# Patient Record
Sex: FEMALE | Race: WHITE | NOT HISPANIC OR LATINO | Employment: FULL TIME | URBAN - METROPOLITAN AREA
[De-identification: names, ages, dates, MRNs, and addresses within clinical notes are randomized per-mention and may not be internally consistent; named-entity substitution may affect disease eponyms.]

---

## 2017-01-01 ENCOUNTER — COMMITTEE REVIEW (OUTPATIENT)
Dept: TRANSPLANT | Facility: CLINIC | Age: 62
End: 2017-01-01

## 2017-01-01 ENCOUNTER — ANESTHESIA (OUTPATIENT)
Dept: INTENSIVE CARE | Facility: HOSPITAL | Age: 62
DRG: 270 | End: 2017-01-01
Payer: COMMERCIAL

## 2017-01-01 ENCOUNTER — EDUCATION (OUTPATIENT)
Dept: TRANSPLANT | Facility: CLINIC | Age: 62
End: 2017-01-01

## 2017-01-01 ENCOUNTER — HOSPITAL ENCOUNTER (INPATIENT)
Facility: HOSPITAL | Age: 62
LOS: 23 days | DRG: 270 | End: 2017-01-27
Attending: INTERNAL MEDICINE | Admitting: INTERNAL MEDICINE
Payer: COMMERCIAL

## 2017-01-01 ENCOUNTER — ANESTHESIA EVENT (OUTPATIENT)
Dept: INTENSIVE CARE | Facility: HOSPITAL | Age: 62
DRG: 270 | End: 2017-01-01
Payer: COMMERCIAL

## 2017-01-01 ENCOUNTER — TELEPHONE (OUTPATIENT)
Dept: ADMINISTRATIVE | Facility: HOSPITAL | Age: 62
End: 2017-01-01

## 2017-01-01 ENCOUNTER — APPOINTMENT (OUTPATIENT)
Dept: CARDIOLOGY | Facility: CLINIC | Age: 62
End: 2017-01-01
Payer: COMMERCIAL

## 2017-01-01 ENCOUNTER — DOCUMENTATION ONLY (OUTPATIENT)
Dept: TRANSPLANT | Facility: CLINIC | Age: 62
End: 2017-01-01

## 2017-01-01 ENCOUNTER — TELEPHONE (OUTPATIENT)
Dept: TRANSPLANT | Facility: CLINIC | Age: 62
End: 2017-01-01

## 2017-01-01 ENCOUNTER — SOCIAL WORK (OUTPATIENT)
Dept: TRANSPLANT | Facility: HOSPITAL | Age: 62
End: 2017-01-01
Payer: COMMERCIAL

## 2017-01-01 VITALS
SYSTOLIC BLOOD PRESSURE: 132 MMHG | OXYGEN SATURATION: 96 % | HEART RATE: 80 BPM | RESPIRATION RATE: 18 BRPM | HEIGHT: 71 IN | WEIGHT: 233.44 LBS | TEMPERATURE: 100 F | BODY MASS INDEX: 32.68 KG/M2 | DIASTOLIC BLOOD PRESSURE: 55 MMHG

## 2017-01-01 DIAGNOSIS — I42.8 NICM (NONISCHEMIC CARDIOMYOPATHY): ICD-10-CM

## 2017-01-01 DIAGNOSIS — I50.9 ACUTE DECOMPENSATED HEART FAILURE: Primary | ICD-10-CM

## 2017-01-01 DIAGNOSIS — Z71.89 GOALS OF CARE, COUNSELING/DISCUSSION: ICD-10-CM

## 2017-01-01 DIAGNOSIS — R91.8 PULMONARY INFILTRATES ON CXR: ICD-10-CM

## 2017-01-01 DIAGNOSIS — R91.8 ABNORMAL CT LUNG SCREENING: ICD-10-CM

## 2017-01-01 DIAGNOSIS — Z76.82 ORGAN TRANSPLANT CANDIDATE: ICD-10-CM

## 2017-01-01 DIAGNOSIS — B78.9 STRONGYLOIDOSIS: ICD-10-CM

## 2017-01-01 DIAGNOSIS — Z01.818 PRE-TRANSPLANT EVALUATION FOR HEART TRANSPLANT: ICD-10-CM

## 2017-01-01 DIAGNOSIS — Z51.5 PALLIATIVE CARE ENCOUNTER: ICD-10-CM

## 2017-01-01 DIAGNOSIS — R53.83 FATIGUE, UNSPECIFIED TYPE: ICD-10-CM

## 2017-01-01 DIAGNOSIS — I42.8 OTHER CARDIOMYOPATHIES: ICD-10-CM

## 2017-01-01 LAB
25(OH)D3+25(OH)D2 SERPL-MCNC: 15 NG/ML
A1 AG RBC QL: NORMAL
ABO + RH BLD: NORMAL
ABO + RH BLD: NORMAL
ALBUMIN SERPL BCP-MCNC: 2.4 G/DL
ALBUMIN SERPL BCP-MCNC: 2.4 G/DL
ALBUMIN SERPL BCP-MCNC: 2.5 G/DL
ALBUMIN SERPL BCP-MCNC: 2.5 G/DL
ALBUMIN SERPL BCP-MCNC: 2.6 G/DL
ALBUMIN SERPL BCP-MCNC: 2.7 G/DL
ALBUMIN SERPL BCP-MCNC: 2.8 G/DL
ALBUMIN SERPL BCP-MCNC: 2.9 G/DL
ALBUMIN SERPL BCP-MCNC: 3 G/DL
ALBUMIN SERPL BCP-MCNC: 3.1 G/DL
ALBUMIN SERPL BCP-MCNC: 3.2 G/DL
ALBUMIN SERPL BCP-MCNC: 3.3 G/DL
ALBUMIN SERPL BCP-MCNC: 3.3 G/DL
ALBUMIN SERPL BCP-MCNC: 3.4 G/DL
ALLENS TEST: ABNORMAL
ALP SERPL-CCNC: 38 U/L
ALP SERPL-CCNC: 40 U/L
ALP SERPL-CCNC: 41 U/L
ALP SERPL-CCNC: 42 U/L
ALP SERPL-CCNC: 43 U/L
ALP SERPL-CCNC: 43 U/L
ALP SERPL-CCNC: 44 U/L
ALP SERPL-CCNC: 45 U/L
ALP SERPL-CCNC: 46 U/L
ALP SERPL-CCNC: 47 U/L
ALP SERPL-CCNC: 49 U/L
ALP SERPL-CCNC: 50 U/L
ALP SERPL-CCNC: 51 U/L
ALP SERPL-CCNC: 52 U/L
ALP SERPL-CCNC: 53 U/L
ALP SERPL-CCNC: 54 U/L
ALP SERPL-CCNC: 56 U/L
ALP SERPL-CCNC: 57 U/L
ALP SERPL-CCNC: 61 U/L
ALP SERPL-CCNC: 63 U/L
ALP SERPL-CCNC: 63 U/L
ALT SERPL W/O P-5'-P-CCNC: 10 U/L
ALT SERPL W/O P-5'-P-CCNC: 11 U/L
ALT SERPL W/O P-5'-P-CCNC: 13 U/L
ALT SERPL W/O P-5'-P-CCNC: 14 U/L
ALT SERPL W/O P-5'-P-CCNC: 17 U/L
ALT SERPL W/O P-5'-P-CCNC: 17 U/L
ALT SERPL W/O P-5'-P-CCNC: 20 U/L
ALT SERPL W/O P-5'-P-CCNC: 22 U/L
ALT SERPL W/O P-5'-P-CCNC: 23 U/L
ALT SERPL W/O P-5'-P-CCNC: 23 U/L
ALT SERPL W/O P-5'-P-CCNC: 24 U/L
ALT SERPL W/O P-5'-P-CCNC: 25 U/L
ALT SERPL W/O P-5'-P-CCNC: 25 U/L
ALT SERPL W/O P-5'-P-CCNC: 27 U/L
ALT SERPL W/O P-5'-P-CCNC: 27 U/L
ALT SERPL W/O P-5'-P-CCNC: 28 U/L
ALT SERPL W/O P-5'-P-CCNC: 30 U/L
ALT SERPL W/O P-5'-P-CCNC: 31 U/L
ALT SERPL W/O P-5'-P-CCNC: 35 U/L
ALT SERPL W/O P-5'-P-CCNC: 36 U/L
ALT SERPL W/O P-5'-P-CCNC: 37 U/L
ALT SERPL W/O P-5'-P-CCNC: 38 U/L
ALT SERPL W/O P-5'-P-CCNC: 40 U/L
ALT SERPL W/O P-5'-P-CCNC: 8 U/L
ALT SERPL W/O P-5'-P-CCNC: 9 U/L
AMPHETAMINES SERPL QL: NEGATIVE
ANA SER QL IF: NORMAL
ANCA AB TITR SER IF: NORMAL TITER
ANION GAP SERPL CALC-SCNC: 10 MMOL/L
ANION GAP SERPL CALC-SCNC: 11 MMOL/L
ANION GAP SERPL CALC-SCNC: 12 MMOL/L
ANION GAP SERPL CALC-SCNC: 13 MMOL/L
ANION GAP SERPL CALC-SCNC: 13 MMOL/L
ANION GAP SERPL CALC-SCNC: 14 MMOL/L
ANION GAP SERPL CALC-SCNC: 15 MMOL/L
ANION GAP SERPL CALC-SCNC: 15 MMOL/L
ANION GAP SERPL CALC-SCNC: 16 MMOL/L
ANION GAP SERPL CALC-SCNC: 17 MMOL/L
ANION GAP SERPL CALC-SCNC: 5 MMOL/L
ANION GAP SERPL CALC-SCNC: 7 MMOL/L
ANION GAP SERPL CALC-SCNC: 7 MMOL/L
ANION GAP SERPL CALC-SCNC: 8 MMOL/L
ANION GAP SERPL CALC-SCNC: 9 MMOL/L
ANISOCYTOSIS BLD QL SMEAR: SLIGHT
APPEARANCE FLD: NORMAL
APTT BLDCRRT: 105.8 SEC
APTT BLDCRRT: 26.1 SEC
APTT BLDCRRT: 26.6 SEC
AST SERPL-CCNC: 12 U/L
AST SERPL-CCNC: 12 U/L
AST SERPL-CCNC: 13 U/L
AST SERPL-CCNC: 14 U/L
AST SERPL-CCNC: 15 U/L
AST SERPL-CCNC: 16 U/L
AST SERPL-CCNC: 17 U/L
AST SERPL-CCNC: 17 U/L
AST SERPL-CCNC: 18 U/L
AST SERPL-CCNC: 20 U/L
AST SERPL-CCNC: 21 U/L
AST SERPL-CCNC: 22 U/L
AST SERPL-CCNC: 24 U/L
AST SERPL-CCNC: 24 U/L
AST SERPL-CCNC: 25 U/L
AST SERPL-CCNC: 25 U/L
AST SERPL-CCNC: 27 U/L
AST SERPL-CCNC: 29 U/L
AST SERPL-CCNC: 31 U/L
AST SERPL-CCNC: 33 U/L
AST SERPL-CCNC: 37 U/L
AT III AG ACT/NOR PPP IA: 82 %
BACTERIA #/AREA URNS AUTO: ABNORMAL /HPF
BACTERIA BLD CULT: NORMAL
BACTERIA SPEC AEROBE CULT: NORMAL
BACTERIA UR CULT: NO GROWTH
BACTERIA UR CULT: NO GROWTH
BARBITURATES SERPL QL SCN: NEGATIVE
BASOPHILS # BLD AUTO: 0.01 K/UL
BASOPHILS # BLD AUTO: 0.02 K/UL
BASOPHILS # BLD AUTO: 0.03 K/UL
BASOPHILS # BLD AUTO: 0.06 K/UL
BASOPHILS # BLD AUTO: 0.06 K/UL
BASOPHILS NFR BLD: 0.1 %
BASOPHILS NFR BLD: 0.2 %
BASOPHILS NFR BLD: 0.3 %
BASOPHILS NFR BLD: 0.4 %
BENZODIAZ SERPL QL: NEGATIVE
BILIRUB DIRECT SERPL-MCNC: 0.3 MG/DL
BILIRUB SERPL-MCNC: 0.5 MG/DL
BILIRUB SERPL-MCNC: 0.6 MG/DL
BILIRUB SERPL-MCNC: 0.7 MG/DL
BILIRUB SERPL-MCNC: 0.8 MG/DL
BILIRUB SERPL-MCNC: 0.9 MG/DL
BILIRUB SERPL-MCNC: 1 MG/DL
BILIRUB SERPL-MCNC: 1.1 MG/DL
BILIRUB SERPL-MCNC: 1.3 MG/DL
BILIRUB SERPL-MCNC: 1.4 MG/DL
BILIRUB SERPL-MCNC: 1.4 MG/DL
BILIRUB UR QL STRIP: NEGATIVE
BLD GP AB SCN CELLS X3 SERPL QL: NORMAL
BNP SERPL-MCNC: 158 PG/ML
BNP SERPL-MCNC: 253 PG/ML
BNP SERPL-MCNC: 354 PG/ML
BNP SERPL-MCNC: 594 PG/ML
BNP SERPL-MCNC: <10 PG/ML
BODY FLD TYPE: NORMAL
BODY FLUID COMMENTS: NORMAL
BUN SERPL-MCNC: 12 MG/DL
BUN SERPL-MCNC: 13 MG/DL
BUN SERPL-MCNC: 14 MG/DL
BUN SERPL-MCNC: 15 MG/DL
BUN SERPL-MCNC: 16 MG/DL
BUN SERPL-MCNC: 17 MG/DL
BUN SERPL-MCNC: 17 MG/DL
BUN SERPL-MCNC: 18 MG/DL
BUN SERPL-MCNC: 18 MG/DL
BUN SERPL-MCNC: 19 MG/DL
BUN SERPL-MCNC: 21 MG/DL
BUN SERPL-MCNC: 22 MG/DL
BUN SERPL-MCNC: 22 MG/DL
BUN SERPL-MCNC: 24 MG/DL
BUN SERPL-MCNC: 25 MG/DL
BUN SERPL-MCNC: 27 MG/DL
BUN SERPL-MCNC: 28 MG/DL
BUN SERPL-MCNC: 28 MG/DL
BUN SERPL-MCNC: 31 MG/DL
BUN SERPL-MCNC: 34 MG/DL
BUN SERPL-MCNC: 36 MG/DL
BUN SERPL-MCNC: 36 MG/DL
BUN SERPL-MCNC: 37 MG/DL
BUN SERPL-MCNC: 45 MG/DL
BUN SERPL-MCNC: 53 MG/DL
BUN SERPL-MCNC: 56 MG/DL
BUN SERPL-MCNC: 60 MG/DL
BUN SERPL-MCNC: 61 MG/DL
CALCIUM SERPL-MCNC: 8.4 MG/DL
CALCIUM SERPL-MCNC: 8.4 MG/DL
CALCIUM SERPL-MCNC: 8.5 MG/DL
CALCIUM SERPL-MCNC: 8.6 MG/DL
CALCIUM SERPL-MCNC: 8.7 MG/DL
CALCIUM SERPL-MCNC: 8.8 MG/DL
CALCIUM SERPL-MCNC: 8.9 MG/DL
CALCIUM SERPL-MCNC: 9 MG/DL
CALCIUM SERPL-MCNC: 9.1 MG/DL
CALCIUM SERPL-MCNC: 9.2 MG/DL
CALCIUM SERPL-MCNC: 9.4 MG/DL
CALCIUM SERPL-MCNC: 9.5 MG/DL
CANNABINOIDS SERPL QL: NEGATIVE
CARDIOLIPIN IGG SER IA-ACNC: <9.4 GPL
CARDIOLIPIN IGM SER IA-ACNC: <9.4 MPL
CCP AB SER IA-ACNC: 0.6 U/ML
CHLORIDE SERPL-SCNC: 100 MMOL/L
CHLORIDE SERPL-SCNC: 101 MMOL/L
CHLORIDE SERPL-SCNC: 101 MMOL/L
CHLORIDE SERPL-SCNC: 102 MMOL/L
CHLORIDE SERPL-SCNC: 103 MMOL/L
CHLORIDE SERPL-SCNC: 104 MMOL/L
CHLORIDE SERPL-SCNC: 105 MMOL/L
CHLORIDE SERPL-SCNC: 91 MMOL/L
CHLORIDE SERPL-SCNC: 92 MMOL/L
CHLORIDE SERPL-SCNC: 92 MMOL/L
CHLORIDE SERPL-SCNC: 93 MMOL/L
CHLORIDE SERPL-SCNC: 94 MMOL/L
CHLORIDE SERPL-SCNC: 95 MMOL/L
CHLORIDE SERPL-SCNC: 96 MMOL/L
CHLORIDE SERPL-SCNC: 97 MMOL/L
CHLORIDE SERPL-SCNC: 98 MMOL/L
CHLORIDE SERPL-SCNC: 99 MMOL/L
CHOLEST/HDLC SERPL: 4.4 {RATIO}
CHOLEST/HDLC SERPL: 4.7 {RATIO}
CHOLEST/HDLC SERPL: 4.7 {RATIO}
CK MB SERPL-MCNC: 0.4 NG/ML
CK MB SERPL-MCNC: 0.6 NG/ML
CK MB SERPL-RTO: 1.7 %
CK MB SERPL-RTO: 3.3 %
CK SERPL-CCNC: 18 U/L
CK SERPL-CCNC: 18 U/L
CK SERPL-CCNC: 24 U/L
CLARITY UR REFRACT.AUTO: ABNORMAL
CLARITY UR REFRACT.AUTO: CLEAR
CLARITY UR REFRACT.AUTO: CLEAR
CLASS I ANTIBODIES - LUMINEX: NORMAL
CLASS I ANTIBODY COMMENTS - LUMINEX: NORMAL
CLASS II ANTIBODY COMMENTS - LUMINEX: NORMAL
CMV IGG SERPL QL IA: REACTIVE
CO2 SERPL-SCNC: 20 MMOL/L
CO2 SERPL-SCNC: 22 MMOL/L
CO2 SERPL-SCNC: 23 MMOL/L
CO2 SERPL-SCNC: 25 MMOL/L
CO2 SERPL-SCNC: 26 MMOL/L
CO2 SERPL-SCNC: 27 MMOL/L
CO2 SERPL-SCNC: 28 MMOL/L
CO2 SERPL-SCNC: 29 MMOL/L
CO2 SERPL-SCNC: 31 MMOL/L
CO2 SERPL-SCNC: 32 MMOL/L
CO2 SERPL-SCNC: 33 MMOL/L
CO2 SERPL-SCNC: 33 MMOL/L
CO2 SERPL-SCNC: 36 MMOL/L
CO2 SERPL-SCNC: 36 MMOL/L
CO2 SERPL-SCNC: 37 MMOL/L
CO2 SERPL-SCNC: 37 MMOL/L
CO2 SERPL-SCNC: 38 MMOL/L
CO2 SERPL-SCNC: 38 MMOL/L
COCAINE SERPL QL: NEGATIVE
COLOR FLD: NORMAL
COLOR UR AUTO: YELLOW
CORTIS SERPL-MCNC: 18.3 UG/DL
COTININE SERPL-MCNC: <3 NG/ML
CPRA %: 64
CREAT SERPL-MCNC: 1 MG/DL
CREAT SERPL-MCNC: 1.1 MG/DL
CREAT SERPL-MCNC: 1.2 MG/DL
CREAT SERPL-MCNC: 1.3 MG/DL
CREAT SERPL-MCNC: 1.4 MG/DL
CREAT SERPL-MCNC: 1.6 MG/DL
CREAT SERPL-MCNC: 1.7 MG/DL
CREAT SERPL-MCNC: 1.8 MG/DL
CREAT SERPL-MCNC: 1.9 MG/DL
CREAT SERPL-MCNC: 2.1 MG/DL
CREAT SERPL-MCNC: 2.1 MG/DL
CREAT SERPL-MCNC: 2.3 MG/DL
CRP SERPL-MCNC: 136.1 MG/L
CRP SERPL-MCNC: 149.9 MG/L
CRP SERPL-MCNC: 15 MG/L
CRP SERPL-MCNC: 20.9 MG/L
CRP SERPL-MCNC: 23.1 MG/L
CRP SERPL-MCNC: 30.9 MG/L
CRP SERPL-MCNC: 32.3 MG/L
CRP SERPL-MCNC: 379 MG/L
CRYPTOC AG SER QL LA: NEGATIVE
DELSYS: ABNORMAL
DIASTOLIC DYSFUNCTION: NO
DIFFERENTIAL METHOD: ABNORMAL
EBV VCA IGG SER QL IA: POSITIVE
EOSINOPHIL # BLD AUTO: 0 K/UL
EOSINOPHIL # BLD AUTO: 0.1 K/UL
EOSINOPHIL # BLD AUTO: 0.2 K/UL
EOSINOPHIL # BLD AUTO: 0.3 K/UL
EOSINOPHIL # BLD AUTO: 0.5 K/UL
EOSINOPHIL # BLD AUTO: 0.7 K/UL
EOSINOPHIL NFR BLD: 0 %
EOSINOPHIL NFR BLD: 0.5 %
EOSINOPHIL NFR BLD: 0.7 %
EOSINOPHIL NFR BLD: 1.2 %
EOSINOPHIL NFR BLD: 1.4 %
EOSINOPHIL NFR BLD: 1.5 %
EOSINOPHIL NFR BLD: 1.5 %
EOSINOPHIL NFR BLD: 1.6 %
EOSINOPHIL NFR BLD: 1.7 %
EOSINOPHIL NFR BLD: 1.9 %
EOSINOPHIL NFR BLD: 2 %
EOSINOPHIL NFR BLD: 2 %
EOSINOPHIL NFR BLD: 2.1 %
EOSINOPHIL NFR BLD: 2.1 %
EOSINOPHIL NFR BLD: 2.2 %
EOSINOPHIL NFR BLD: 2.3 %
EOSINOPHIL NFR BLD: 2.5 %
EOSINOPHIL NFR BLD: 2.5 %
EOSINOPHIL NFR BLD: 3.2 %
EOSINOPHIL NFR BLD: 4.1 %
EOSINOPHIL NFR FLD MANUAL: 2 %
ERYTHROCYTE [DISTWIDTH] IN BLOOD BY AUTOMATED COUNT: 16.2 %
ERYTHROCYTE [DISTWIDTH] IN BLOOD BY AUTOMATED COUNT: 16.3 %
ERYTHROCYTE [DISTWIDTH] IN BLOOD BY AUTOMATED COUNT: 16.4 %
ERYTHROCYTE [DISTWIDTH] IN BLOOD BY AUTOMATED COUNT: 16.5 %
ERYTHROCYTE [DISTWIDTH] IN BLOOD BY AUTOMATED COUNT: 16.6 %
ERYTHROCYTE [DISTWIDTH] IN BLOOD BY AUTOMATED COUNT: 16.7 %
ERYTHROCYTE [DISTWIDTH] IN BLOOD BY AUTOMATED COUNT: 16.8 %
ERYTHROCYTE [DISTWIDTH] IN BLOOD BY AUTOMATED COUNT: 16.9 %
ERYTHROCYTE [DISTWIDTH] IN BLOOD BY AUTOMATED COUNT: 17 %
ERYTHROCYTE [DISTWIDTH] IN BLOOD BY AUTOMATED COUNT: 17.2 %
ERYTHROCYTE [DISTWIDTH] IN BLOOD BY AUTOMATED COUNT: 17.6 %
ERYTHROCYTE [DISTWIDTH] IN BLOOD BY AUTOMATED COUNT: 17.6 %
ERYTHROCYTE [DISTWIDTH] IN BLOOD BY AUTOMATED COUNT: 17.7 %
ERYTHROCYTE [DISTWIDTH] IN BLOOD BY AUTOMATED COUNT: 17.7 %
ERYTHROCYTE [DISTWIDTH] IN BLOOD BY AUTOMATED COUNT: 17.9 %
ERYTHROCYTE [DISTWIDTH] IN BLOOD BY AUTOMATED COUNT: 18 %
ERYTHROCYTE [DISTWIDTH] IN BLOOD BY AUTOMATED COUNT: 18 %
ERYTHROCYTE [DISTWIDTH] IN BLOOD BY AUTOMATED COUNT: 18.2 %
ERYTHROCYTE [DISTWIDTH] IN BLOOD BY AUTOMATED COUNT: 18.2 %
ERYTHROCYTE [DISTWIDTH] IN BLOOD BY AUTOMATED COUNT: 18.3 %
ERYTHROCYTE [DISTWIDTH] IN BLOOD BY AUTOMATED COUNT: 18.3 %
ERYTHROCYTE [SEDIMENTATION RATE] IN BLOOD BY WESTERGREN METHOD: 12 MM/H
ERYTHROCYTE [SEDIMENTATION RATE] IN BLOOD BY WESTERGREN METHOD: 14 MM/H
ERYTHROCYTE [SEDIMENTATION RATE] IN BLOOD BY WESTERGREN METHOD: 25 MM/H
ERYTHROCYTE [SEDIMENTATION RATE] IN BLOOD BY WESTERGREN METHOD: 27 MM/H
ERYTHROCYTE [SEDIMENTATION RATE] IN BLOOD BY WESTERGREN METHOD: 28 MM/H
ERYTHROCYTE [SEDIMENTATION RATE] IN BLOOD BY WESTERGREN METHOD: 49 MM/HR
EST. GFR  (AFRICAN AMERICAN): 25.7 ML/MIN/1.73 M^2
EST. GFR  (AFRICAN AMERICAN): 28.7 ML/MIN/1.73 M^2
EST. GFR  (AFRICAN AMERICAN): 28.7 ML/MIN/1.73 M^2
EST. GFR  (AFRICAN AMERICAN): 32.3 ML/MIN/1.73 M^2
EST. GFR  (AFRICAN AMERICAN): 34.5 ML/MIN/1.73 M^2
EST. GFR  (AFRICAN AMERICAN): 37 ML/MIN/1.73 M^2
EST. GFR  (AFRICAN AMERICAN): 39.8 ML/MIN/1.73 M^2
EST. GFR  (AFRICAN AMERICAN): 46.8 ML/MIN/1.73 M^2
EST. GFR  (AFRICAN AMERICAN): 51.2 ML/MIN/1.73 M^2
EST. GFR  (AFRICAN AMERICAN): 56.4 ML/MIN/1.73 M^2
EST. GFR  (AFRICAN AMERICAN): >60 ML/MIN/1.73 M^2
EST. GFR  (NON AFRICAN AMERICAN): 22.3 ML/MIN/1.73 M^2
EST. GFR  (NON AFRICAN AMERICAN): 24.9 ML/MIN/1.73 M^2
EST. GFR  (NON AFRICAN AMERICAN): 24.9 ML/MIN/1.73 M^2
EST. GFR  (NON AFRICAN AMERICAN): 28.1 ML/MIN/1.73 M^2
EST. GFR  (NON AFRICAN AMERICAN): 29.9 ML/MIN/1.73 M^2
EST. GFR  (NON AFRICAN AMERICAN): 32.1 ML/MIN/1.73 M^2
EST. GFR  (NON AFRICAN AMERICAN): 34.5 ML/MIN/1.73 M^2
EST. GFR  (NON AFRICAN AMERICAN): 40.6 ML/MIN/1.73 M^2
EST. GFR  (NON AFRICAN AMERICAN): 44.4 ML/MIN/1.73 M^2
EST. GFR  (NON AFRICAN AMERICAN): 48.9 ML/MIN/1.73 M^2
EST. GFR  (NON AFRICAN AMERICAN): 54.3 ML/MIN/1.73 M^2
EST. GFR  (NON AFRICAN AMERICAN): >60 ML/MIN/1.73 M^2
ESTIMATED AVG GLUCOSE: 171 MG/DL
ESTIMATED PA SYSTOLIC PRESSURE: 22
ESTIMATED PA SYSTOLIC PRESSURE: 45
ESTIMATED PA SYSTOLIC PRESSURE: 52.7
ETHANOL SERPL-MCNC: NEGATIVE MG/DL
F2 GENE MUT ANL BLD/T: NORMAL
F5 P.R506Q BLD/T QL: NORMAL
FACT VIII ACT/NOR PPP: 27 %
FACT X PPP CHRO-ACNC: 0.15 IU/ML
FACT X PPP CHRO-ACNC: 0.19 IU/ML
FACT X PPP CHRO-ACNC: 0.19 IU/ML
FACT X PPP CHRO-ACNC: 0.21 IU/ML
FACT X PPP CHRO-ACNC: 0.22 IU/ML
FACT X PPP CHRO-ACNC: 0.22 IU/ML
FACT X PPP CHRO-ACNC: 0.25 IU/ML
FACT X PPP CHRO-ACNC: 0.26 IU/ML
FACT X PPP CHRO-ACNC: 0.27 IU/ML
FACT X PPP CHRO-ACNC: 0.27 IU/ML
FACT X PPP CHRO-ACNC: 0.28 IU/ML
FACT X PPP CHRO-ACNC: 0.29 IU/ML
FACT X PPP CHRO-ACNC: 0.3 IU/ML
FACT X PPP CHRO-ACNC: 0.3 IU/ML
FACT X PPP CHRO-ACNC: 0.31 IU/ML
FACT X PPP CHRO-ACNC: 0.32 IU/ML
FACT X PPP CHRO-ACNC: 0.32 IU/ML
FACT X PPP CHRO-ACNC: 0.34 IU/ML
FACT X PPP CHRO-ACNC: 0.37 IU/ML
FACT X PPP CHRO-ACNC: 0.38 IU/ML
FACT X PPP CHRO-ACNC: 0.39 IU/ML
FACT X PPP CHRO-ACNC: 0.4 IU/ML
FACT X PPP CHRO-ACNC: 0.41 IU/ML
FACT X PPP CHRO-ACNC: 0.42 IU/ML
FACT X PPP CHRO-ACNC: 0.43 IU/ML
FACT X PPP CHRO-ACNC: 0.44 IU/ML
FACT X PPP CHRO-ACNC: 0.44 IU/ML
FACT X PPP CHRO-ACNC: 0.46 IU/ML
FACT X PPP CHRO-ACNC: 0.5 IU/ML
FACT X PPP CHRO-ACNC: 0.56 IU/ML
FACT X PPP CHRO-ACNC: 0.59 IU/ML
FACT X PPP CHRO-ACNC: 0.64 IU/ML
FACT X PPP CHRO-ACNC: 0.64 IU/ML
FACT X PPP CHRO-ACNC: 1.02 IU/ML
FACT X PPP CHRO-ACNC: 1.42 IU/ML
FACT X PPP CHRO-ACNC: <0.1 IU/ML
FACT X PPP CHRO-ACNC: <0.1 IU/ML
FERRITIN SERPL-MCNC: 182 NG/ML
FIBRINOGEN PPP-MCNC: 341 MG/DL
FIO2: 100
FIO2: 21
FIO2: 50
FIO2: 60
GLUCOSE SERPL-MCNC: 108 MG/DL
GLUCOSE SERPL-MCNC: 110 MG/DL
GLUCOSE SERPL-MCNC: 114 MG/DL
GLUCOSE SERPL-MCNC: 117 MG/DL
GLUCOSE SERPL-MCNC: 121 MG/DL
GLUCOSE SERPL-MCNC: 122 MG/DL
GLUCOSE SERPL-MCNC: 122 MG/DL
GLUCOSE SERPL-MCNC: 123 MG/DL
GLUCOSE SERPL-MCNC: 124 MG/DL
GLUCOSE SERPL-MCNC: 125 MG/DL
GLUCOSE SERPL-MCNC: 127 MG/DL
GLUCOSE SERPL-MCNC: 127 MG/DL
GLUCOSE SERPL-MCNC: 131 MG/DL
GLUCOSE SERPL-MCNC: 132 MG/DL
GLUCOSE SERPL-MCNC: 135 MG/DL
GLUCOSE SERPL-MCNC: 140 MG/DL
GLUCOSE SERPL-MCNC: 147 MG/DL
GLUCOSE SERPL-MCNC: 152 MG/DL
GLUCOSE SERPL-MCNC: 152 MG/DL
GLUCOSE SERPL-MCNC: 155 MG/DL
GLUCOSE SERPL-MCNC: 155 MG/DL
GLUCOSE SERPL-MCNC: 160 MG/DL
GLUCOSE SERPL-MCNC: 160 MG/DL
GLUCOSE SERPL-MCNC: 166 MG/DL
GLUCOSE SERPL-MCNC: 168 MG/DL
GLUCOSE SERPL-MCNC: 173 MG/DL
GLUCOSE SERPL-MCNC: 173 MG/DL
GLUCOSE SERPL-MCNC: 184 MG/DL
GLUCOSE SERPL-MCNC: 187 MG/DL
GLUCOSE SERPL-MCNC: 188 MG/DL
GLUCOSE SERPL-MCNC: 191 MG/DL
GLUCOSE SERPL-MCNC: 191 MG/DL
GLUCOSE SERPL-MCNC: 200 MG/DL
GLUCOSE SERPL-MCNC: 208 MG/DL
GLUCOSE SERPL-MCNC: 219 MG/DL
GLUCOSE SERPL-MCNC: 222 MG/DL
GLUCOSE SERPL-MCNC: 223 MG/DL
GLUCOSE SERPL-MCNC: 223 MG/DL
GLUCOSE SERPL-MCNC: 231 MG/DL
GLUCOSE SERPL-MCNC: 243 MG/DL
GLUCOSE SERPL-MCNC: 245 MG/DL
GLUCOSE SERPL-MCNC: 273 MG/DL
GLUCOSE SERPL-MCNC: 320 MG/DL
GLUCOSE SERPL-MCNC: 374 MG/DL
GLUCOSE SERPL-MCNC: 388 MG/DL
GLUCOSE UR QL STRIP: NEGATIVE
GRAM STN SPEC: NORMAL
HBA1C MFR BLD HPLC: 7.6 %
HBV CORE AB SERPL QL IA: NEGATIVE
HBV SURFACE AB SER-ACNC: NEGATIVE M[IU]/ML
HBV SURFACE AG SERPL QL IA: NEGATIVE
HCO3 UR-SCNC: 22.7 MMOL/L (ref 24–28)
HCO3 UR-SCNC: 22.8 MMOL/L (ref 24–28)
HCO3 UR-SCNC: 23 MMOL/L (ref 24–28)
HCO3 UR-SCNC: 23.1 MMOL/L (ref 24–28)
HCO3 UR-SCNC: 23.5 MMOL/L (ref 24–28)
HCO3 UR-SCNC: 24.6 MMOL/L (ref 24–28)
HCO3 UR-SCNC: 24.7 MMOL/L (ref 24–28)
HCO3 UR-SCNC: 24.8 MMOL/L (ref 24–28)
HCO3 UR-SCNC: 24.9 MMOL/L (ref 24–28)
HCO3 UR-SCNC: 24.9 MMOL/L (ref 24–28)
HCO3 UR-SCNC: 25.2 MMOL/L (ref 24–28)
HCO3 UR-SCNC: 25.6 MMOL/L (ref 24–28)
HCO3 UR-SCNC: 26.7 MMOL/L (ref 24–28)
HCO3 UR-SCNC: 27 MMOL/L (ref 24–28)
HCO3 UR-SCNC: 27.6 MMOL/L (ref 24–28)
HCO3 UR-SCNC: 27.7 MMOL/L (ref 24–28)
HCO3 UR-SCNC: 28.1 MMOL/L (ref 24–28)
HCO3 UR-SCNC: 28.1 MMOL/L (ref 24–28)
HCO3 UR-SCNC: 28.2 MMOL/L (ref 24–28)
HCO3 UR-SCNC: 28.3 MMOL/L (ref 24–28)
HCO3 UR-SCNC: 28.9 MMOL/L (ref 24–28)
HCO3 UR-SCNC: 29.7 MMOL/L (ref 24–28)
HCO3 UR-SCNC: 29.9 MMOL/L (ref 24–28)
HCO3 UR-SCNC: 34.6 MMOL/L (ref 24–28)
HCO3 UR-SCNC: 36.1 MMOL/L (ref 24–28)
HCO3 UR-SCNC: 37 MMOL/L (ref 24–28)
HCO3 UR-SCNC: 37.4 MMOL/L (ref 24–28)
HCO3 UR-SCNC: 40.9 MMOL/L (ref 24–28)
HCT VFR BLD AUTO: 29.7 %
HCT VFR BLD AUTO: 29.9 %
HCT VFR BLD AUTO: 31.7 %
HCT VFR BLD AUTO: 31.7 %
HCT VFR BLD AUTO: 32 %
HCT VFR BLD AUTO: 33.8 %
HCT VFR BLD AUTO: 35.7 %
HCT VFR BLD AUTO: 36 %
HCT VFR BLD AUTO: 36.5 %
HCT VFR BLD AUTO: 37.5 %
HCT VFR BLD AUTO: 37.8 %
HCT VFR BLD AUTO: 37.9 %
HCT VFR BLD AUTO: 38.1 %
HCT VFR BLD AUTO: 38.2 %
HCT VFR BLD AUTO: 38.2 %
HCT VFR BLD AUTO: 38.7 %
HCT VFR BLD AUTO: 39.1 %
HCT VFR BLD AUTO: 40.2 %
HCT VFR BLD AUTO: 40.6 %
HCT VFR BLD AUTO: 41.1 %
HCT VFR BLD AUTO: 41.9 %
HCT VFR BLD AUTO: 42.1 %
HCT VFR BLD AUTO: 43.7 %
HCT VFR BLD AUTO: 44.2 %
HCT VFR BLD AUTO: 45.5 %
HCV AB SERPL QL IA: NEGATIVE
HCYS SERPL-SCNC: 10.2 UMOL/L
HDL/CHOLESTEROL RATIO: 21.3 %
HDL/CHOLESTEROL RATIO: 21.4 %
HDL/CHOLESTEROL RATIO: 23 %
HDLC SERPL-MCNC: 135 MG/DL
HDLC SERPL-MCNC: 169 MG/DL
HDLC SERPL-MCNC: 173 MG/DL
HDLC SERPL-MCNC: 31 MG/DL
HDLC SERPL-MCNC: 36 MG/DL
HDLC SERPL-MCNC: 37 MG/DL
HEPATITIS A ANTIBODY, IGG: POSITIVE
HGB BLD-MCNC: 10.2 G/DL
HGB BLD-MCNC: 10.2 G/DL
HGB BLD-MCNC: 10.3 G/DL
HGB BLD-MCNC: 10.9 G/DL
HGB BLD-MCNC: 11.4 G/DL
HGB BLD-MCNC: 11.5 G/DL
HGB BLD-MCNC: 11.6 G/DL
HGB BLD-MCNC: 11.8 G/DL
HGB BLD-MCNC: 11.9 G/DL
HGB BLD-MCNC: 11.9 G/DL
HGB BLD-MCNC: 12.1 G/DL
HGB BLD-MCNC: 12.3 G/DL
HGB BLD-MCNC: 12.6 G/DL
HGB BLD-MCNC: 13 G/DL
HGB BLD-MCNC: 13.4 G/DL
HGB BLD-MCNC: 13.5 G/DL
HGB BLD-MCNC: 13.8 G/DL
HGB BLD-MCNC: 14 G/DL
HGB BLD-MCNC: 14.3 G/DL
HGB BLD-MCNC: 9.2 G/DL
HGB BLD-MCNC: 9.7 G/DL
HGB UR QL STRIP: ABNORMAL
HGB UR QL STRIP: NEGATIVE
HGB UR QL STRIP: NEGATIVE
HIV 1+2 AB+HIV1 P24 AG SERPL QL IA: NEGATIVE
HSV1 IGG SERPL QL IA: NEGATIVE
HSV2 IGG SERPL QL IA: POSITIVE
HTLV I+II AB SER QL IA: NEGATIVE
HYALINE CASTS UR QL AUTO: 35 /LPF
HYPOCHROMIA BLD QL SMEAR: ABNORMAL
INR PPP: 1
INR PPP: 1.1
INR PPP: 1.2
INR PPP: 1.3
INR PPP: 1.4
INR PPP: 1.4
INR PPP: 1.6
INR PPP: 1.7
INR PPP: 2
INR PPP: 2.1
IRON SERPL-MCNC: 28 UG/DL
KETONES UR QL STRIP: NEGATIVE
KOH PREP SPEC: NORMAL
LACTATE SERPL-SCNC: 1.8 MMOL/L
LDH SERPL L TO P-CCNC: 1.48 MMOL/L (ref 0.36–1.25)
LDH SERPL L TO P-CCNC: 2.98 MMOL/L (ref 0.5–2.2)
LDH SERPL L TO P-CCNC: 337 U/L
LDLC SERPL CALC-MCNC: 103.6 MG/DL
LDLC SERPL CALC-MCNC: 108.2 MG/DL
LDLC SERPL CALC-MCNC: 79.2 MG/DL
LEUKOCYTE ESTERASE UR QL STRIP: ABNORMAL
LEUKOCYTE ESTERASE UR QL STRIP: NEGATIVE
LEUKOCYTE ESTERASE UR QL STRIP: NEGATIVE
LYMPHOCYTES # BLD AUTO: 1.1 K/UL
LYMPHOCYTES # BLD AUTO: 1.1 K/UL
LYMPHOCYTES # BLD AUTO: 1.3 K/UL
LYMPHOCYTES # BLD AUTO: 1.9 K/UL
LYMPHOCYTES # BLD AUTO: 2.2 K/UL
LYMPHOCYTES # BLD AUTO: 2.3 K/UL
LYMPHOCYTES # BLD AUTO: 2.4 K/UL
LYMPHOCYTES # BLD AUTO: 2.5 K/UL
LYMPHOCYTES # BLD AUTO: 2.5 K/UL
LYMPHOCYTES # BLD AUTO: 2.6 K/UL
LYMPHOCYTES # BLD AUTO: 2.7 K/UL
LYMPHOCYTES # BLD AUTO: 2.8 K/UL
LYMPHOCYTES # BLD AUTO: 2.9 K/UL
LYMPHOCYTES # BLD AUTO: 3 K/UL
LYMPHOCYTES # BLD AUTO: 3.2 K/UL
LYMPHOCYTES # BLD AUTO: 3.5 K/UL
LYMPHOCYTES # BLD AUTO: 3.5 K/UL
LYMPHOCYTES # BLD AUTO: 3.7 K/UL
LYMPHOCYTES NFR BLD: 13.2 %
LYMPHOCYTES NFR BLD: 16.3 %
LYMPHOCYTES NFR BLD: 16.5 %
LYMPHOCYTES NFR BLD: 18.3 %
LYMPHOCYTES NFR BLD: 21 %
LYMPHOCYTES NFR BLD: 21.3 %
LYMPHOCYTES NFR BLD: 21.5 %
LYMPHOCYTES NFR BLD: 22.2 %
LYMPHOCYTES NFR BLD: 23.4 %
LYMPHOCYTES NFR BLD: 24.5 %
LYMPHOCYTES NFR BLD: 25.1 %
LYMPHOCYTES NFR BLD: 25.1 %
LYMPHOCYTES NFR BLD: 25.6 %
LYMPHOCYTES NFR BLD: 26.1 %
LYMPHOCYTES NFR BLD: 26.1 %
LYMPHOCYTES NFR BLD: 26.8 %
LYMPHOCYTES NFR BLD: 28 %
LYMPHOCYTES NFR BLD: 28.6 %
LYMPHOCYTES NFR BLD: 31.1 %
LYMPHOCYTES NFR BLD: 32.2 %
LYMPHOCYTES NFR BLD: 33.6 %
LYMPHOCYTES NFR BLD: 34.5 %
LYMPHOCYTES NFR BLD: 6.4 %
LYMPHOCYTES NFR BLD: 6.4 %
LYMPHOCYTES NFR BLD: 7.2 %
LYMPHOCYTES NFR FLD MANUAL: 2 %
MAGNESIUM SERPL-MCNC: 1.7 MG/DL
MAGNESIUM SERPL-MCNC: 1.8 MG/DL
MAGNESIUM SERPL-MCNC: 1.9 MG/DL
MAGNESIUM SERPL-MCNC: 2 MG/DL
MAGNESIUM SERPL-MCNC: 2.1 MG/DL
MAGNESIUM SERPL-MCNC: 2.2 MG/DL
MAGNESIUM SERPL-MCNC: 2.3 MG/DL
MAGNESIUM SERPL-MCNC: 2.4 MG/DL
MAGNESIUM SERPL-MCNC: 2.6 MG/DL
MAGNESIUM SERPL-MCNC: 2.7 MG/DL
MCH RBC QN AUTO: 23.7 PG
MCH RBC QN AUTO: 23.8 PG
MCH RBC QN AUTO: 23.8 PG
MCH RBC QN AUTO: 23.9 PG
MCH RBC QN AUTO: 24 PG
MCH RBC QN AUTO: 24.1 PG
MCH RBC QN AUTO: 24.2 PG
MCH RBC QN AUTO: 24.3 PG
MCH RBC QN AUTO: 24.6 PG
MCH RBC QN AUTO: 24.6 PG
MCH RBC QN AUTO: 24.7 PG
MCH RBC QN AUTO: 24.7 PG
MCHC RBC AUTO-ENTMCNC: 30.6 %
MCHC RBC AUTO-ENTMCNC: 30.9 %
MCHC RBC AUTO-ENTMCNC: 31 %
MCHC RBC AUTO-ENTMCNC: 31 %
MCHC RBC AUTO-ENTMCNC: 31.3 %
MCHC RBC AUTO-ENTMCNC: 31.4 %
MCHC RBC AUTO-ENTMCNC: 31.4 %
MCHC RBC AUTO-ENTMCNC: 31.5 %
MCHC RBC AUTO-ENTMCNC: 31.6 %
MCHC RBC AUTO-ENTMCNC: 31.6 %
MCHC RBC AUTO-ENTMCNC: 31.7 %
MCHC RBC AUTO-ENTMCNC: 31.8 %
MCHC RBC AUTO-ENTMCNC: 31.9 %
MCHC RBC AUTO-ENTMCNC: 32 %
MCHC RBC AUTO-ENTMCNC: 32.1 %
MCHC RBC AUTO-ENTMCNC: 32.2 %
MCHC RBC AUTO-ENTMCNC: 32.4 %
MCV RBC AUTO: 74 FL
MCV RBC AUTO: 74 FL
MCV RBC AUTO: 75 FL
MCV RBC AUTO: 76 FL
MCV RBC AUTO: 77 FL
MCV RBC AUTO: 78 FL
MESOTHL CELL NFR FLD MANUAL: 0 %
METHADONE SERPL QL SCN: NEGATIVE
METHEMOGLOBIN: 0.7 % (ref 0–3)
METHEMOGLOBIN: 0.9 % (ref 0–3)
MICROSCOPIC COMMENT: ABNORMAL
MIN VOL: 14.9
MIN VOL: 5.81
MIN VOL: 6.45
MIN VOL: 6.75
MIN VOL: 9
MITOGEN NIL: >10 IU/ML
MITRAL VALVE MOBILITY: NORMAL
MITRAL VALVE MOBILITY: NORMAL
MITRAL VALVE REGURGITATION: ABNORMAL
MODE: ABNORMAL
MONOCYTES # BLD AUTO: 0.5 K/UL
MONOCYTES # BLD AUTO: 0.6 K/UL
MONOCYTES # BLD AUTO: 0.6 K/UL
MONOCYTES # BLD AUTO: 0.7 K/UL
MONOCYTES # BLD AUTO: 0.8 K/UL
MONOCYTES # BLD AUTO: 0.9 K/UL
MONOCYTES # BLD AUTO: 1 K/UL
MONOCYTES # BLD AUTO: 1 K/UL
MONOCYTES # BLD AUTO: 1.1 K/UL
MONOCYTES # BLD AUTO: 1.1 K/UL
MONOCYTES # BLD AUTO: 1.2 K/UL
MONOCYTES # BLD AUTO: 1.3 K/UL
MONOCYTES # BLD AUTO: 1.5 K/UL
MONOCYTES # BLD AUTO: 1.5 K/UL
MONOCYTES # BLD AUTO: 1.9 K/UL
MONOCYTES NFR BLD: 10.6 %
MONOCYTES NFR BLD: 10.6 %
MONOCYTES NFR BLD: 11.6 %
MONOCYTES NFR BLD: 3.2 %
MONOCYTES NFR BLD: 4.4 %
MONOCYTES NFR BLD: 4.4 %
MONOCYTES NFR BLD: 5.8 %
MONOCYTES NFR BLD: 5.8 %
MONOCYTES NFR BLD: 6.1 %
MONOCYTES NFR BLD: 6.4 %
MONOCYTES NFR BLD: 7.1 %
MONOCYTES NFR BLD: 7.1 %
MONOCYTES NFR BLD: 7.7 %
MONOCYTES NFR BLD: 7.9 %
MONOCYTES NFR BLD: 7.9 %
MONOCYTES NFR BLD: 8 %
MONOCYTES NFR BLD: 8.2 %
MONOCYTES NFR BLD: 8.3 %
MONOCYTES NFR BLD: 8.3 %
MONOCYTES NFR BLD: 8.6 %
MONOCYTES NFR BLD: 8.7 %
MONOCYTES NFR BLD: 9 %
MONOCYTES NFR BLD: 9 %
MONOCYTES NFR BLD: 9.5 %
MONOCYTES NFR BLD: 9.7 %
MONOS+MACROS NFR FLD MANUAL: 63 %
MYELOPEROXIDASE AB SER-ACNC: 2 UNITS
NEUTROPHILS # BLD AUTO: 10.2 K/UL
NEUTROPHILS # BLD AUTO: 10.3 K/UL
NEUTROPHILS # BLD AUTO: 10.4 K/UL
NEUTROPHILS # BLD AUTO: 11.4 K/UL
NEUTROPHILS # BLD AUTO: 12.6 K/UL
NEUTROPHILS # BLD AUTO: 15.7 K/UL
NEUTROPHILS # BLD AUTO: 15.7 K/UL
NEUTROPHILS # BLD AUTO: 16 K/UL
NEUTROPHILS # BLD AUTO: 4.6 K/UL
NEUTROPHILS # BLD AUTO: 4.7 K/UL
NEUTROPHILS # BLD AUTO: 5 K/UL
NEUTROPHILS # BLD AUTO: 5.2 K/UL
NEUTROPHILS # BLD AUTO: 5.5 K/UL
NEUTROPHILS # BLD AUTO: 5.6 K/UL
NEUTROPHILS # BLD AUTO: 5.7 K/UL
NEUTROPHILS # BLD AUTO: 5.8 K/UL
NEUTROPHILS # BLD AUTO: 5.9 K/UL
NEUTROPHILS # BLD AUTO: 6.2 K/UL
NEUTROPHILS # BLD AUTO: 6.9 K/UL
NEUTROPHILS # BLD AUTO: 7 K/UL
NEUTROPHILS # BLD AUTO: 8.2 K/UL
NEUTROPHILS # BLD AUTO: 8.7 K/UL
NEUTROPHILS # BLD AUTO: 8.8 K/UL
NEUTROPHILS # BLD AUTO: 8.9 K/UL
NEUTROPHILS # BLD AUTO: 9 K/UL
NEUTROPHILS NFR BLD: 55.9 %
NEUTROPHILS NFR BLD: 56.3 %
NEUTROPHILS NFR BLD: 56.9 %
NEUTROPHILS NFR BLD: 58.4 %
NEUTROPHILS NFR BLD: 59.2 %
NEUTROPHILS NFR BLD: 59.7 %
NEUTROPHILS NFR BLD: 61.3 %
NEUTROPHILS NFR BLD: 62.6 %
NEUTROPHILS NFR BLD: 62.7 %
NEUTROPHILS NFR BLD: 62.9 %
NEUTROPHILS NFR BLD: 63.4 %
NEUTROPHILS NFR BLD: 63.7 %
NEUTROPHILS NFR BLD: 63.9 %
NEUTROPHILS NFR BLD: 64 %
NEUTROPHILS NFR BLD: 64.3 %
NEUTROPHILS NFR BLD: 68.1 %
NEUTROPHILS NFR BLD: 68.6 %
NEUTROPHILS NFR BLD: 70.8 %
NEUTROPHILS NFR BLD: 71.5 %
NEUTROPHILS NFR BLD: 71.6 %
NEUTROPHILS NFR BLD: 74.6 %
NEUTROPHILS NFR BLD: 79.9 %
NEUTROPHILS NFR BLD: 88.7 %
NEUTROPHILS NFR BLD: 88.7 %
NEUTROPHILS NFR BLD: 89.1 %
NEUTROPHILS NFR FLD MANUAL: 33 %
NICOTINE SERPL-MCNC: <3 NG/ML
NIL: 0.05 IU/ML
NITRITE UR QL STRIP: NEGATIVE
NONHDLC SERPL-MCNC: 104 MG/DL
NONHDLC SERPL-MCNC: 133 MG/DL
NONHDLC SERPL-MCNC: 136 MG/DL
OPIATES SERPL QL SCN: NEGATIVE
OVALOCYTES BLD QL SMEAR: ABNORMAL
P-ANCA TITR SER IF: NORMAL TITER
PCO2 BLDA: 29.3 MMHG (ref 35–45)
PCO2 BLDA: 31.4 MMHG (ref 35–45)
PCO2 BLDA: 31.7 MMHG (ref 35–45)
PCO2 BLDA: 34.3 MMHG (ref 35–45)
PCO2 BLDA: 34.4 MMHG (ref 35–45)
PCO2 BLDA: 36.1 MMHG (ref 35–45)
PCO2 BLDA: 36.6 MMHG (ref 35–45)
PCO2 BLDA: 37.1 MMHG (ref 35–45)
PCO2 BLDA: 37.2 MMHG (ref 35–45)
PCO2 BLDA: 39.1 MMHG (ref 35–45)
PCO2 BLDA: 40.5 MMHG (ref 35–45)
PCO2 BLDA: 40.6 MMHG (ref 35–45)
PCO2 BLDA: 42 MMHG (ref 35–45)
PCO2 BLDA: 42.7 MMHG (ref 35–45)
PCO2 BLDA: 43.1 MMHG (ref 35–45)
PCO2 BLDA: 44.1 MMHG (ref 35–45)
PCO2 BLDA: 44.3 MMHG (ref 35–45)
PCO2 BLDA: 45.2 MMHG (ref 35–45)
PCO2 BLDA: 45.4 MMHG (ref 35–45)
PCO2 BLDA: 45.9 MMHG (ref 35–45)
PCO2 BLDA: 48.9 MMHG (ref 35–45)
PCO2 BLDA: 49.2 MMHG (ref 35–45)
PCO2 BLDA: 49.6 MMHG (ref 35–45)
PCO2 BLDA: 50.5 MMHG (ref 35–45)
PCO2 BLDA: 50.6 MMHG (ref 35–45)
PCO2 BLDA: 51.5 MMHG (ref 35–45)
PCO2 BLDA: 52.5 MMHG (ref 35–45)
PCO2 BLDA: 56.2 MMHG (ref 35–45)
PCP SERPL QL SCN: NEGATIVE
PEEP: 5
PH SMN: 7.28 [PH] (ref 7.35–7.45)
PH SMN: 7.31 [PH] (ref 7.35–7.45)
PH SMN: 7.34 [PH] (ref 7.35–7.45)
PH SMN: 7.34 [PH] (ref 7.35–7.45)
PH SMN: 7.39 [PH] (ref 7.35–7.45)
PH SMN: 7.4 [PH] (ref 7.35–7.45)
PH SMN: 7.41 [PH] (ref 7.35–7.45)
PH SMN: 7.41 [PH] (ref 7.35–7.45)
PH SMN: 7.42 [PH] (ref 7.35–7.45)
PH SMN: 7.43 [PH] (ref 7.35–7.45)
PH SMN: 7.43 [PH] (ref 7.35–7.45)
PH SMN: 7.44 [PH] (ref 7.35–7.45)
PH SMN: 7.44 [PH] (ref 7.35–7.45)
PH SMN: 7.45 [PH] (ref 7.35–7.45)
PH SMN: 7.45 [PH] (ref 7.35–7.45)
PH SMN: 7.46 [PH] (ref 7.35–7.45)
PH SMN: 7.47 [PH] (ref 7.35–7.45)
PH SMN: 7.48 [PH] (ref 7.35–7.45)
PH SMN: 7.49 [PH] (ref 7.35–7.45)
PH SMN: 7.51 [PH] (ref 7.35–7.45)
PH SMN: 7.53 [PH] (ref 7.35–7.45)
PH UR STRIP: 6 [PH] (ref 5–8)
PH UR STRIP: 7 [PH] (ref 5–8)
PH UR STRIP: 7 [PH] (ref 5–8)
PHOSPHATE SERPL-MCNC: 2.6 MG/DL
PHOSPHATE SERPL-MCNC: 2.8 MG/DL
PHOSPHATE SERPL-MCNC: 2.9 MG/DL
PHOSPHATE SERPL-MCNC: 3.1 MG/DL
PHOSPHATE SERPL-MCNC: 3.2 MG/DL
PHOSPHATE SERPL-MCNC: 3.2 MG/DL
PHOSPHATE SERPL-MCNC: 3.3 MG/DL
PHOSPHATE SERPL-MCNC: 3.5 MG/DL
PHOSPHATE SERPL-MCNC: 3.5 MG/DL
PHOSPHATE SERPL-MCNC: 3.7 MG/DL
PHOSPHATE SERPL-MCNC: 3.9 MG/DL
PHOSPHATE SERPL-MCNC: 4 MG/DL
PHOSPHATE SERPL-MCNC: 4 MG/DL
PHOSPHATE SERPL-MCNC: 4.1 MG/DL
PHOSPHATE SERPL-MCNC: 4.1 MG/DL
PHOSPHATE SERPL-MCNC: 4.3 MG/DL
PHOSPHATE SERPL-MCNC: 4.4 MG/DL
PHOSPHATE SERPL-MCNC: 4.4 MG/DL
PHOSPHATE SERPL-MCNC: 4.6 MG/DL
PHOSPHATE SERPL-MCNC: 4.6 MG/DL
PHOSPHATE SERPL-MCNC: 4.9 MG/DL
PHOSPHATE SERPL-MCNC: 4.9 MG/DL
PHOSPHATE SERPL-MCNC: 5 MG/DL
PHOSPHATE SERPL-MCNC: 5.4 MG/DL
PIP: 11
PIP: 21
PIP: 24
PIP: 24
PIP: 29
PLATELET # BLD AUTO: 205 K/UL
PLATELET # BLD AUTO: 209 K/UL
PLATELET # BLD AUTO: 213 K/UL
PLATELET # BLD AUTO: 216 K/UL
PLATELET # BLD AUTO: 226 K/UL
PLATELET # BLD AUTO: 227 K/UL
PLATELET # BLD AUTO: 228 K/UL
PLATELET # BLD AUTO: 232 K/UL
PLATELET # BLD AUTO: 234 K/UL
PLATELET # BLD AUTO: 237 K/UL
PLATELET # BLD AUTO: 238 K/UL
PLATELET # BLD AUTO: 240 K/UL
PLATELET # BLD AUTO: 242 K/UL
PLATELET # BLD AUTO: 243 K/UL
PLATELET # BLD AUTO: 251 K/UL
PLATELET # BLD AUTO: 252 K/UL
PLATELET # BLD AUTO: 263 K/UL
PLATELET # BLD AUTO: 270 K/UL
PLATELET # BLD AUTO: 270 K/UL
PLATELET # BLD AUTO: 278 K/UL
PLATELET # BLD AUTO: 280 K/UL
PLATELET # BLD AUTO: 282 K/UL
PLATELET # BLD AUTO: 287 K/UL
PLATELET # BLD AUTO: 298 K/UL
PLATELET # BLD AUTO: 315 K/UL
PMV BLD AUTO: 10.8 FL
PMV BLD AUTO: 10.8 FL
PMV BLD AUTO: 11 FL
PMV BLD AUTO: 11.1 FL
PMV BLD AUTO: 11.1 FL
PMV BLD AUTO: 11.2 FL
PMV BLD AUTO: 11.2 FL
PMV BLD AUTO: 11.3 FL
PMV BLD AUTO: 11.6 FL
PMV BLD AUTO: 11.7 FL
PMV BLD AUTO: 11.8 FL
PMV BLD AUTO: 11.9 FL
PMV BLD AUTO: 11.9 FL
PMV BLD AUTO: 12 FL
PMV BLD AUTO: 12 FL
PMV BLD AUTO: 12.1 FL
PMV BLD AUTO: 12.1 FL
PMV BLD AUTO: 12.5 FL
PMV BLD AUTO: ABNORMAL FL
PMV BLD AUTO: ABNORMAL FL
PO2 BLDA: 172 MMHG (ref 80–100)
PO2 BLDA: 18 MMHG (ref 40–60)
PO2 BLDA: 18 MMHG (ref 40–60)
PO2 BLDA: 205 MMHG (ref 80–100)
PO2 BLDA: 22 MMHG (ref 40–60)
PO2 BLDA: 23 MMHG (ref 40–60)
PO2 BLDA: 23 MMHG (ref 40–60)
PO2 BLDA: 239 MMHG (ref 80–100)
PO2 BLDA: 24 MMHG (ref 40–60)
PO2 BLDA: 24 MMHG (ref 40–60)
PO2 BLDA: 25 MMHG (ref 40–60)
PO2 BLDA: 25 MMHG (ref 40–60)
PO2 BLDA: 26 MMHG (ref 40–60)
PO2 BLDA: 27 MMHG (ref 40–60)
PO2 BLDA: 28 MMHG (ref 40–60)
PO2 BLDA: 29 MMHG (ref 40–60)
PO2 BLDA: 31 MMHG (ref 40–60)
PO2 BLDA: 32 MMHG (ref 40–60)
PO2 BLDA: 33 MMHG (ref 40–60)
PO2 BLDA: 35 MMHG (ref 40–60)
PO2 BLDA: 35 MMHG (ref 40–60)
PO2 BLDA: 37 MMHG (ref 40–60)
PO2 BLDA: 412 MMHG (ref 80–100)
POC BE: -1 MMOL/L
POC BE: -2 MMOL/L
POC BE: -4 MMOL/L
POC BE: 0 MMOL/L
POC BE: 0 MMOL/L
POC BE: 1 MMOL/L
POC BE: 11 MMOL/L
POC BE: 12 MMOL/L
POC BE: 14 MMOL/L
POC BE: 14 MMOL/L
POC BE: 18 MMOL/L
POC BE: 2 MMOL/L
POC BE: 3 MMOL/L
POC BE: 4 MMOL/L
POC BE: 4 MMOL/L
POC BE: 5 MMOL/L
POC BE: 6 MMOL/L
POC BE: 6 MMOL/L
POC SATURATED O2: 100 % (ref 95–100)
POC SATURATED O2: 31 % (ref 95–100)
POC SATURATED O2: 31 % (ref 95–100)
POC SATURATED O2: 38 % (ref 95–100)
POC SATURATED O2: 39 % (ref 95–100)
POC SATURATED O2: 40 % (ref 95–100)
POC SATURATED O2: 43 % (ref 95–100)
POC SATURATED O2: 43 % (ref 95–100)
POC SATURATED O2: 44 % (ref 95–100)
POC SATURATED O2: 45 % (ref 95–100)
POC SATURATED O2: 46 % (ref 95–100)
POC SATURATED O2: 46 % (ref 95–100)
POC SATURATED O2: 49 % (ref 95–100)
POC SATURATED O2: 49 % (ref 95–100)
POC SATURATED O2: 53 % (ref 95–100)
POC SATURATED O2: 54 % (ref 95–100)
POC SATURATED O2: 54 % (ref 95–100)
POC SATURATED O2: 56 % (ref 95–100)
POC SATURATED O2: 58 % (ref 95–100)
POC SATURATED O2: 60 % (ref 95–100)
POC SATURATED O2: 65 % (ref 95–100)
POC SATURATED O2: 67 % (ref 95–100)
POC SATURATED O2: 67 % (ref 95–100)
POC SATURATED O2: 70 % (ref 95–100)
POC SATURATED O2: 73 % (ref 95–100)
POC TCO2: 24 MMOL/L (ref 24–29)
POC TCO2: 25 MMOL/L (ref 24–29)
POC TCO2: 26 MMOL/L (ref 23–27)
POC TCO2: 26 MMOL/L (ref 23–27)
POC TCO2: 26 MMOL/L (ref 24–29)
POC TCO2: 27 MMOL/L (ref 23–27)
POC TCO2: 28 MMOL/L (ref 24–29)
POC TCO2: 28 MMOL/L (ref 24–29)
POC TCO2: 29 MMOL/L (ref 23–27)
POC TCO2: 29 MMOL/L (ref 24–29)
POC TCO2: 29 MMOL/L (ref 24–29)
POC TCO2: 30 MMOL/L (ref 24–29)
POC TCO2: 31 MMOL/L (ref 24–29)
POC TCO2: 31 MMOL/L (ref 24–29)
POC TCO2: 36 MMOL/L (ref 24–29)
POC TCO2: 38 MMOL/L (ref 24–29)
POC TCO2: 38 MMOL/L (ref 24–29)
POC TCO2: 39 MMOL/L (ref 24–29)
POC TCO2: 42 MMOL/L (ref 24–29)
POCT GLUCOSE: 100 MG/DL (ref 70–110)
POCT GLUCOSE: 114 MG/DL (ref 70–110)
POCT GLUCOSE: 115 MG/DL (ref 70–110)
POCT GLUCOSE: 118 MG/DL (ref 70–110)
POCT GLUCOSE: 118 MG/DL (ref 70–110)
POCT GLUCOSE: 119 MG/DL (ref 70–110)
POCT GLUCOSE: 120 MG/DL (ref 70–110)
POCT GLUCOSE: 121 MG/DL (ref 70–110)
POCT GLUCOSE: 121 MG/DL (ref 70–110)
POCT GLUCOSE: 122 MG/DL (ref 70–110)
POCT GLUCOSE: 123 MG/DL (ref 70–110)
POCT GLUCOSE: 124 MG/DL (ref 70–110)
POCT GLUCOSE: 125 MG/DL (ref 70–110)
POCT GLUCOSE: 127 MG/DL (ref 70–110)
POCT GLUCOSE: 130 MG/DL (ref 70–110)
POCT GLUCOSE: 150 MG/DL (ref 70–110)
POCT GLUCOSE: 152 MG/DL (ref 70–110)
POCT GLUCOSE: 153 MG/DL (ref 70–110)
POCT GLUCOSE: 159 MG/DL (ref 70–110)
POCT GLUCOSE: 160 MG/DL (ref 70–110)
POCT GLUCOSE: 162 MG/DL (ref 70–110)
POCT GLUCOSE: 164 MG/DL (ref 70–110)
POCT GLUCOSE: 173 MG/DL (ref 70–110)
POCT GLUCOSE: 182 MG/DL (ref 70–110)
POCT GLUCOSE: 194 MG/DL (ref 70–110)
POCT GLUCOSE: 196 MG/DL (ref 70–110)
POCT GLUCOSE: 200 MG/DL (ref 70–110)
POCT GLUCOSE: 224 MG/DL (ref 70–110)
POCT GLUCOSE: 234 MG/DL (ref 70–110)
POCT GLUCOSE: 350 MG/DL (ref 70–110)
POCT GLUCOSE: 99 MG/DL (ref 70–110)
POIKILOCYTOSIS BLD QL SMEAR: SLIGHT
POLYCHROMASIA BLD QL SMEAR: ABNORMAL
POTASSIUM SERPL-SCNC: 3.5 MMOL/L
POTASSIUM SERPL-SCNC: 3.6 MMOL/L
POTASSIUM SERPL-SCNC: 3.7 MMOL/L
POTASSIUM SERPL-SCNC: 3.7 MMOL/L
POTASSIUM SERPL-SCNC: 3.8 MMOL/L
POTASSIUM SERPL-SCNC: 3.9 MMOL/L
POTASSIUM SERPL-SCNC: 4 MMOL/L
POTASSIUM SERPL-SCNC: 4.1 MMOL/L
POTASSIUM SERPL-SCNC: 4.2 MMOL/L
POTASSIUM SERPL-SCNC: 4.3 MMOL/L
POTASSIUM SERPL-SCNC: 4.4 MMOL/L
POTASSIUM SERPL-SCNC: 4.5 MMOL/L
POTASSIUM SERPL-SCNC: 4.7 MMOL/L
POTASSIUM SERPL-SCNC: 4.8 MMOL/L
POTASSIUM SERPL-SCNC: 5 MMOL/L
POTASSIUM SERPL-SCNC: 5 MMOL/L
POTASSIUM SERPL-SCNC: 5.1 MMOL/L
PRE FEV1 FVC: 76
PRE FEV1: 2.43
PRE FVC: 3.19
PREALB SERPL-MCNC: 15 MG/DL
PREALB SERPL-MCNC: 16 MG/DL
PREALB SERPL-MCNC: 16 MG/DL
PREALB SERPL-MCNC: 17 MG/DL
PREALB SERPL-MCNC: 18 MG/DL
PREALB SERPL-MCNC: 5 MG/DL
PREALB SERPL-MCNC: 8 MG/DL
PREALB SERPL-MCNC: 9 MG/DL
PREDICTED FEV1 FVC: 76
PREDICTED FEV1: 2.95
PREDICTED FVC: 3.76
PROCALCITONIN SERPL IA-MCNC: 6.51 NG/ML
PROPOXYPH SERPL QL: NEGATIVE
PROT S AG ACT/NOR PPP IA: 109 %
PROT SERPL-MCNC: 6.5 G/DL
PROT SERPL-MCNC: 6.6 G/DL
PROT SERPL-MCNC: 6.7 G/DL
PROT SERPL-MCNC: 6.8 G/DL
PROT SERPL-MCNC: 6.9 G/DL
PROT SERPL-MCNC: 7 G/DL
PROT SERPL-MCNC: 7.1 G/DL
PROT SERPL-MCNC: 7.2 G/DL
PROT SERPL-MCNC: 7.3 G/DL
PROT SERPL-MCNC: 7.5 G/DL
PROT UR QL STRIP: ABNORMAL
PROT UR QL STRIP: NEGATIVE
PROT UR QL STRIP: NEGATIVE
PROTHROMBIN TIME: 10.9 SEC
PROTHROMBIN TIME: 10.9 SEC
PROTHROMBIN TIME: 11 SEC
PROTHROMBIN TIME: 11.2 SEC
PROTHROMBIN TIME: 11.2 SEC
PROTHROMBIN TIME: 11.7 SEC
PROTHROMBIN TIME: 11.8 SEC
PROTHROMBIN TIME: 12.3 SEC
PROTHROMBIN TIME: 12.8 SEC
PROTHROMBIN TIME: 12.8 SEC
PROTHROMBIN TIME: 13.1 SEC
PROTHROMBIN TIME: 14.4 SEC
PROTHROMBIN TIME: 14.5 SEC
PROTHROMBIN TIME: 16 SEC
PROTHROMBIN TIME: 17.1 SEC
PROTHROMBIN TIME: 20.2 SEC
PROTHROMBIN TIME: 21.3 SEC
RBC # BLD AUTO: 3.86 M/UL
RBC # BLD AUTO: 4.06 M/UL
RBC # BLD AUTO: 4.15 M/UL
RBC # BLD AUTO: 4.2 M/UL
RBC # BLD AUTO: 4.32 M/UL
RBC # BLD AUTO: 4.5 M/UL
RBC # BLD AUTO: 4.74 M/UL
RBC # BLD AUTO: 4.75 M/UL
RBC # BLD AUTO: 4.78 M/UL
RBC # BLD AUTO: 4.87 M/UL
RBC # BLD AUTO: 4.9 M/UL
RBC # BLD AUTO: 4.9 M/UL
RBC # BLD AUTO: 4.95 M/UL
RBC # BLD AUTO: 4.98 M/UL
RBC # BLD AUTO: 5 M/UL
RBC # BLD AUTO: 5.04 M/UL
RBC # BLD AUTO: 5.04 M/UL
RBC # BLD AUTO: 5.18 M/UL
RBC # BLD AUTO: 5.2 M/UL
RBC # BLD AUTO: 5.43 M/UL
RBC # BLD AUTO: 5.57 M/UL
RBC # BLD AUTO: 5.6 M/UL
RBC # BLD AUTO: 5.74 M/UL
RBC # BLD AUTO: 5.76 M/UL
RBC # BLD AUTO: 5.82 M/UL
RBC #/AREA URNS AUTO: 2 /HPF (ref 0–4)
RETIRED EF AND QEF - SEE NOTES: 10 (ref 55–65)
RETIRED EF AND QEF - SEE NOTES: 10 (ref 55–65)
RETIRED EF AND QEF - SEE NOTES: 15 (ref 55–65)
RHEUMATOID FACT SERPL-ACNC: <10 IU/ML
RPR SER QL: NORMAL
SAMPLE: ABNORMAL
SERUM COLLECTION DT - LUMINEX CLASS I: NORMAL
SERUM COLLECTION DT - LUMINEX CLASS II: NORMAL
SITE: ABNORMAL
SODIUM SERPL-SCNC: 128 MMOL/L
SODIUM SERPL-SCNC: 129 MMOL/L
SODIUM SERPL-SCNC: 129 MMOL/L
SODIUM SERPL-SCNC: 130 MMOL/L
SODIUM SERPL-SCNC: 131 MMOL/L
SODIUM SERPL-SCNC: 132 MMOL/L
SODIUM SERPL-SCNC: 133 MMOL/L
SODIUM SERPL-SCNC: 134 MMOL/L
SODIUM SERPL-SCNC: 135 MMOL/L
SODIUM SERPL-SCNC: 136 MMOL/L
SODIUM SERPL-SCNC: 137 MMOL/L
SODIUM SERPL-SCNC: 138 MMOL/L
SODIUM SERPL-SCNC: 139 MMOL/L
SODIUM SERPL-SCNC: 140 MMOL/L
SODIUM SERPL-SCNC: 141 MMOL/L
SODIUM SERPL-SCNC: 142 MMOL/L
SODIUM SERPL-SCNC: 144 MMOL/L
SODIUM SERPL-SCNC: 144 MMOL/L
SP GR UR STRIP: 1 (ref 1–1.03)
SP GR UR STRIP: 1.01 (ref 1–1.03)
SP GR UR STRIP: 1.02 (ref 1–1.03)
SP02: 100
SP02: 100
SP02: 94
SP02: 94
SP02: 95
SP02: 95
SP02: 96
SP02: 96
SP02: 97
SP02: 98
SP02: 98
SP02: 99
SPCL1 TESTING DATE: NORMAL
SPCL2 TESTING DATE: NORMAL
SQUAMOUS #/AREA URNS AUTO: 4 /HPF
STRONGYLOIDES ANTIBODY IGG: POSITIVE
T GONDII IGG SER QL IA: REACTIVE
T GONDII IGG SERPL IA-ACNC: 41.5 IU/ML
T4 FREE SERPL-MCNC: 0.94 NG/DL
TB ANTIGEN NIL: 0.27 IU/ML
TB ANTIGEN: 0.32 IU/ML
TB GOLD: NEGATIVE
TRANSFERRIN SERPL-MCNC: 221 MG/DL
TRICUSPID VALVE REGURGITATION: ABNORMAL
TRIGL SERPL-MCNC: 124 MG/DL
TRIGL SERPL-MCNC: 139 MG/DL
TRIGL SERPL-MCNC: 147 MG/DL
TROPONIN I SERPL DL<=0.01 NG/ML-MCNC: 0.01 NG/ML
TROPONIN I SERPL DL<=0.01 NG/ML-MCNC: 0.02 NG/ML
TSH SERPL DL<=0.005 MIU/L-ACNC: 1.06 UIU/ML
URN SPEC COLLECT METH UR: ABNORMAL
URN SPEC COLLECT METH UR: NORMAL
URN SPEC COLLECT METH UR: NORMAL
UROBILINOGEN UR STRIP-ACNC: NEGATIVE EU/DL
VANCOMYCIN SERPL-MCNC: 41.9 UG/ML
VANCOMYCIN TROUGH SERPL-MCNC: 21.6 UG/ML
VARICELLA INTERPRETATION: POSITIVE
VARICELLA ZOSTER IGG: 2.67 ISR
VT: 450
VWF AG ACT/NOR PPP IA: 451 %
VWF MULTIMERS PPP QL: NORMAL
VWF:AC ACT/NOR PPP IA: 447 %
WBC # BLD AUTO: 10.16 K/UL
WBC # BLD AUTO: 11.21 K/UL
WBC # BLD AUTO: 11.5 K/UL
WBC # BLD AUTO: 13.18 K/UL
WBC # BLD AUTO: 13.72 K/UL
WBC # BLD AUTO: 13.72 K/UL
WBC # BLD AUTO: 13.8 K/UL
WBC # BLD AUTO: 14.2 K/UL
WBC # BLD AUTO: 14.22 K/UL
WBC # BLD AUTO: 14.53 K/UL
WBC # BLD AUTO: 16.63 K/UL
WBC # BLD AUTO: 17.69 K/UL
WBC # BLD AUTO: 17.69 K/UL
WBC # BLD AUTO: 17.94 K/UL
WBC # BLD AUTO: 18.02 K/UL
WBC # BLD AUTO: 7.98 K/UL
WBC # BLD AUTO: 8.3 K/UL
WBC # BLD AUTO: 8.8 K/UL
WBC # BLD AUTO: 8.84 K/UL
WBC # BLD AUTO: 8.95 K/UL
WBC # BLD AUTO: 9.27 K/UL
WBC # BLD AUTO: 9.35 K/UL
WBC # BLD AUTO: 9.41 K/UL
WBC # BLD AUTO: 9.62 K/UL
WBC # BLD AUTO: 9.83 K/UL
WBC # FLD: 556 /CU MM
WBC #/AREA URNS AUTO: 19 /HPF (ref 0–5)

## 2017-01-01 PROCEDURE — 36591 DRAW BLOOD OFF VENOUS DEVICE: CPT

## 2017-01-01 PROCEDURE — 63600175 PHARM REV CODE 636 W HCPCS: Performed by: PHYSICIAN ASSISTANT

## 2017-01-01 PROCEDURE — 85520 HEPARIN ASSAY: CPT

## 2017-01-01 PROCEDURE — 89051 BODY FLUID CELL COUNT: CPT

## 2017-01-01 PROCEDURE — 25000003 PHARM REV CODE 250: Performed by: NURSE PRACTITIONER

## 2017-01-01 PROCEDURE — 20600001 HC STEP DOWN PRIVATE ROOM

## 2017-01-01 PROCEDURE — 63600175 PHARM REV CODE 636 W HCPCS: Performed by: NURSE PRACTITIONER

## 2017-01-01 PROCEDURE — 99232 SBSQ HOSP IP/OBS MODERATE 35: CPT | Mod: ,,, | Performed by: INTERNAL MEDICINE

## 2017-01-01 PROCEDURE — 20000000 HC ICU ROOM

## 2017-01-01 PROCEDURE — 25000003 PHARM REV CODE 250

## 2017-01-01 PROCEDURE — 99233 SBSQ HOSP IP/OBS HIGH 50: CPT | Mod: ,,, | Performed by: INTERNAL MEDICINE

## 2017-01-01 PROCEDURE — C9113 INJ PANTOPRAZOLE SODIUM, VIA: HCPCS | Performed by: PHYSICIAN ASSISTANT

## 2017-01-01 PROCEDURE — 86703 HIV-1/HIV-2 1 RESULT ANTBDY: CPT

## 2017-01-01 PROCEDURE — 83735 ASSAY OF MAGNESIUM: CPT | Mod: 91

## 2017-01-01 PROCEDURE — 83880 ASSAY OF NATRIURETIC PEPTIDE: CPT

## 2017-01-01 PROCEDURE — 93010 ELECTROCARDIOGRAM REPORT: CPT | Mod: 76,,, | Performed by: INTERNAL MEDICINE

## 2017-01-01 PROCEDURE — 94761 N-INVAS EAR/PLS OXIMETRY MLT: CPT

## 2017-01-01 PROCEDURE — 82533 TOTAL CORTISOL: CPT

## 2017-01-01 PROCEDURE — 63600367 HC NITRIC OXIDE PER HOUR

## 2017-01-01 PROCEDURE — 25000003 PHARM REV CODE 250: Performed by: INTERNAL MEDICINE

## 2017-01-01 PROCEDURE — 25000003 PHARM REV CODE 250: Performed by: PHYSICIAN ASSISTANT

## 2017-01-01 PROCEDURE — 88305 TISSUE EXAM BY PATHOLOGIST: CPT | Mod: 26,,, | Performed by: PATHOLOGY

## 2017-01-01 PROCEDURE — 88112 CYTOPATH CELL ENHANCE TECH: CPT | Mod: 26,,, | Performed by: PATHOLOGY

## 2017-01-01 PROCEDURE — 87449 NOS EACH ORGANISM AG IA: CPT

## 2017-01-01 PROCEDURE — 85610 PROTHROMBIN TIME: CPT

## 2017-01-01 PROCEDURE — 27202055 HC BRONCHOSCOPE, DISP

## 2017-01-01 PROCEDURE — 90670 PCV13 VACCINE IM: CPT | Performed by: PHYSICIAN ASSISTANT

## 2017-01-01 PROCEDURE — 85025 COMPLETE CBC W/AUTO DIFF WBC: CPT

## 2017-01-01 PROCEDURE — 25000003 PHARM REV CODE 250: Performed by: HOSPITALIST

## 2017-01-01 PROCEDURE — 86403 PARTICLE AGGLUT ANTBDY SCRN: CPT

## 2017-01-01 PROCEDURE — 93930 UPPER EXTREMITY STUDY: CPT | Mod: 26,,, | Performed by: INTERNAL MEDICINE

## 2017-01-01 PROCEDURE — 93306 TTE W/DOPPLER COMPLETE: CPT | Mod: 26,,, | Performed by: INTERNAL MEDICINE

## 2017-01-01 PROCEDURE — 99255 IP/OBS CONSLTJ NEW/EST HI 80: CPT | Mod: ,,, | Performed by: INTERNAL MEDICINE

## 2017-01-01 PROCEDURE — 84100 ASSAY OF PHOSPHORUS: CPT

## 2017-01-01 PROCEDURE — 83605 ASSAY OF LACTIC ACID: CPT

## 2017-01-01 PROCEDURE — 82728 ASSAY OF FERRITIN: CPT

## 2017-01-01 PROCEDURE — 63600175 PHARM REV CODE 636 W HCPCS

## 2017-01-01 PROCEDURE — 80053 COMPREHEN METABOLIC PANEL: CPT

## 2017-01-01 PROCEDURE — 88312 SPECIAL STAINS GROUP 1: CPT | Mod: 26,,, | Performed by: PATHOLOGY

## 2017-01-01 PROCEDURE — 83036 HEMOGLOBIN GLYCOSYLATED A1C: CPT

## 2017-01-01 PROCEDURE — 88175 CYTOPATH C/V AUTO FLUID REDO: CPT

## 2017-01-01 PROCEDURE — 81240 F2 GENE: CPT

## 2017-01-01 PROCEDURE — 63600175 PHARM REV CODE 636 W HCPCS: Performed by: INTERNAL MEDICINE

## 2017-01-01 PROCEDURE — 93010 ELECTROCARDIOGRAM REPORT: CPT | Mod: 59,,, | Performed by: INTERNAL MEDICINE

## 2017-01-01 PROCEDURE — 3E0234Z INTRODUCTION OF SERUM, TOXOID AND VACCINE INTO MUSCLE, PERCUTANEOUS APPROACH: ICD-10-PCS | Performed by: INTERNAL MEDICINE

## 2017-01-01 PROCEDURE — 36600 WITHDRAWAL OF ARTERIAL BLOOD: CPT | Mod: 53 | Performed by: INTERNAL MEDICINE

## 2017-01-01 PROCEDURE — 81241 F5 GENE: CPT

## 2017-01-01 PROCEDURE — 85730 THROMBOPLASTIN TIME PARTIAL: CPT

## 2017-01-01 PROCEDURE — 80053 COMPREHEN METABOLIC PANEL: CPT | Mod: 91

## 2017-01-01 PROCEDURE — 93010 ELECTROCARDIOGRAM REPORT: CPT | Mod: ,,, | Performed by: INTERNAL MEDICINE

## 2017-01-01 PROCEDURE — 36556 INSERT NON-TUNNEL CV CATH: CPT

## 2017-01-01 PROCEDURE — 86687 HTLV-I ANTIBODY: CPT

## 2017-01-01 PROCEDURE — 87040 BLOOD CULTURE FOR BACTERIA: CPT

## 2017-01-01 PROCEDURE — 36415 COLL VENOUS BLD VENIPUNCTURE: CPT

## 2017-01-01 PROCEDURE — 97802 MEDICAL NUTRITION INDIV IN: CPT

## 2017-01-01 PROCEDURE — 85520 HEPARIN ASSAY: CPT | Mod: 91

## 2017-01-01 PROCEDURE — 27000221 HC OXYGEN, UP TO 24 HOURS

## 2017-01-01 PROCEDURE — 80202 ASSAY OF VANCOMYCIN: CPT

## 2017-01-01 PROCEDURE — 25000003 PHARM REV CODE 250: Performed by: STUDENT IN AN ORGANIZED HEALTH CARE EDUCATION/TRAINING PROGRAM

## 2017-01-01 PROCEDURE — 99253 IP/OBS CNSLTJ NEW/EST LOW 45: CPT | Mod: ,,, | Performed by: OBSTETRICS & GYNECOLOGY

## 2017-01-01 PROCEDURE — 81373 HLA I TYPING 1 LOCUS LR: CPT | Mod: 91

## 2017-01-01 PROCEDURE — 86832 HLA CLASS I HIGH DEFIN QUAL: CPT

## 2017-01-01 PROCEDURE — 84134 ASSAY OF PREALBUMIN: CPT

## 2017-01-01 PROCEDURE — 83735 ASSAY OF MAGNESIUM: CPT

## 2017-01-01 PROCEDURE — 85610 PROTHROMBIN TIME: CPT | Mod: 91

## 2017-01-01 PROCEDURE — 86704 HEP B CORE ANTIBODY TOTAL: CPT

## 2017-01-01 PROCEDURE — 87070 CULTURE OTHR SPECIMN AEROBIC: CPT

## 2017-01-01 PROCEDURE — 97530 THERAPEUTIC ACTIVITIES: CPT

## 2017-01-01 PROCEDURE — 94002 VENT MGMT INPAT INIT DAY: CPT

## 2017-01-01 PROCEDURE — 80061 LIPID PANEL: CPT

## 2017-01-01 PROCEDURE — C1894 INTRO/SHEATH, NON-LASER: HCPCS

## 2017-01-01 PROCEDURE — 81376 HLA II TYPING 1 LOCUS LR: CPT | Mod: 91

## 2017-01-01 PROCEDURE — 86682 HELMINTH ANTIBODY: CPT

## 2017-01-01 PROCEDURE — 86829 HLA CLASS I/II ANTIBODY QUAL: CPT

## 2017-01-01 PROCEDURE — 85247 CLOT FACTOR VIII MULTIMETRIC: CPT

## 2017-01-01 PROCEDURE — 93306 TTE W/DOPPLER COMPLETE: CPT

## 2017-01-01 PROCEDURE — 86140 C-REACTIVE PROTEIN: CPT

## 2017-01-01 PROCEDURE — 86777 TOXOPLASMA ANTIBODY: CPT

## 2017-01-01 PROCEDURE — 82803 BLOOD GASES ANY COMBINATION: CPT

## 2017-01-01 PROCEDURE — 97116 GAIT TRAINING THERAPY: CPT | Performed by: PHYSICAL THERAPIST

## 2017-01-01 PROCEDURE — 5A02210 ASSISTANCE WITH CARDIAC OUTPUT USING BALLOON PUMP, CONTINUOUS: ICD-10-PCS | Performed by: INTERNAL MEDICINE

## 2017-01-01 PROCEDURE — 87102 FUNGUS ISOLATION CULTURE: CPT

## 2017-01-01 PROCEDURE — 94003 VENT MGMT INPAT SUBQ DAY: CPT

## 2017-01-01 PROCEDURE — 86038 ANTINUCLEAR ANTIBODIES: CPT

## 2017-01-01 PROCEDURE — 87116 MYCOBACTERIA CULTURE: CPT

## 2017-01-01 PROCEDURE — 86695 HERPES SIMPLEX TYPE 1 TEST: CPT

## 2017-01-01 PROCEDURE — 99499 UNLISTED E&M SERVICE: CPT | Mod: ,,, | Performed by: PHYSICIAN ASSISTANT

## 2017-01-01 PROCEDURE — 36592 COLLECT BLOOD FROM PICC: CPT

## 2017-01-01 PROCEDURE — 87340 HEPATITIS B SURFACE AG IA: CPT

## 2017-01-01 PROCEDURE — 84443 ASSAY THYROID STIM HORMONE: CPT

## 2017-01-01 PROCEDURE — 81003 URINALYSIS AUTO W/O SCOPE: CPT

## 2017-01-01 PROCEDURE — 94760 N-INVAS EAR/PLS OXIMETRY 1: CPT

## 2017-01-01 PROCEDURE — 97110 THERAPEUTIC EXERCISES: CPT

## 2017-01-01 PROCEDURE — 5A1945Z RESPIRATORY VENTILATION, 24-96 CONSECUTIVE HOURS: ICD-10-PCS | Performed by: INTERNAL MEDICINE

## 2017-01-01 PROCEDURE — 0BH17EZ INSERTION OF ENDOTRACHEAL AIRWAY INTO TRACHEA, VIA NATURAL OR ARTIFICIAL OPENING: ICD-10-PCS | Performed by: ANESTHESIOLOGY

## 2017-01-01 PROCEDURE — 86833 HLA CLASS II HIGH DEFIN QUAL: CPT

## 2017-01-01 PROCEDURE — 33967 INSERT I-AORT PERCUT DEVICE: CPT | Mod: ,,, | Performed by: INTERNAL MEDICINE

## 2017-01-01 PROCEDURE — 31500 INSERT EMERGENCY AIRWAY: CPT | Mod: ,,, | Performed by: ANESTHESIOLOGY

## 2017-01-01 PROCEDURE — 36569 INSJ PICC 5 YR+ W/O IMAGING: CPT

## 2017-01-01 PROCEDURE — 86706 HEP B SURFACE ANTIBODY: CPT

## 2017-01-01 PROCEDURE — 87015 SPECIMEN INFECT AGNT CONCNTJ: CPT

## 2017-01-01 PROCEDURE — 02HV33Z INSERTION OF INFUSION DEVICE INTO SUPERIOR VENA CAVA, PERCUTANEOUS APPROACH: ICD-10-PCS | Performed by: INTERNAL MEDICINE

## 2017-01-01 PROCEDURE — 97166 OT EVAL MOD COMPLEX 45 MIN: CPT

## 2017-01-01 PROCEDURE — 86901 BLOOD TYPING SEROLOGIC RH(D): CPT

## 2017-01-01 PROCEDURE — 83090 ASSAY OF HOMOCYSTEINE: CPT

## 2017-01-01 PROCEDURE — 90715 TDAP VACCINE 7 YRS/> IM: CPT | Performed by: PHYSICIAN ASSISTANT

## 2017-01-01 PROCEDURE — 90471 IMMUNIZATION ADMIN: CPT | Performed by: PHYSICIAN ASSISTANT

## 2017-01-01 PROCEDURE — 87205 SMEAR GRAM STAIN: CPT

## 2017-01-01 PROCEDURE — 99900035 HC TECH TIME PER 15 MIN (STAT)

## 2017-01-01 PROCEDURE — 84484 ASSAY OF TROPONIN QUANT: CPT

## 2017-01-01 PROCEDURE — 82248 BILIRUBIN DIRECT: CPT

## 2017-01-01 PROCEDURE — 82553 CREATINE MB FRACTION: CPT

## 2017-01-01 PROCEDURE — 85301 ANTITHROMBIN III ANTIGEN: CPT

## 2017-01-01 PROCEDURE — 93005 ELECTROCARDIOGRAM TRACING: CPT

## 2017-01-01 PROCEDURE — 87210 SMEAR WET MOUNT SALINE/INK: CPT

## 2017-01-01 PROCEDURE — 99223 1ST HOSP IP/OBS HIGH 75: CPT | Mod: ,,, | Performed by: INTERNAL MEDICINE

## 2017-01-01 PROCEDURE — B2141ZZ FLUOROSCOPY OF RIGHT HEART USING LOW OSMOLAR CONTRAST: ICD-10-PCS | Performed by: INTERNAL MEDICINE

## 2017-01-01 PROCEDURE — 99900026 HC AIRWAY MAINTENANCE (STAT)

## 2017-01-01 PROCEDURE — 88305 TISSUE EXAM BY PATHOLOGIST: CPT | Performed by: PATHOLOGY

## 2017-01-01 PROCEDURE — 27000202 HC IABP, ADD'L DAY

## 2017-01-01 PROCEDURE — 63600175 PHARM REV CODE 636 W HCPCS: Performed by: HOSPITALIST

## 2017-01-01 PROCEDURE — 86787 VARICELLA-ZOSTER ANTIBODY: CPT

## 2017-01-01 PROCEDURE — 87496 CYTOMEG DNA AMP PROBE: CPT

## 2017-01-01 PROCEDURE — 83050 HGB METHEMOGLOBIN QUAN: CPT

## 2017-01-01 PROCEDURE — 27200234 CATH LAB PROCEDURE

## 2017-01-01 PROCEDURE — 86790 VIRUS ANTIBODY NOS: CPT

## 2017-01-01 PROCEDURE — 93930 UPPER EXTREMITY STUDY: CPT

## 2017-01-01 PROCEDURE — 86828 HLA CLASS I&II ANTIBODY QUAL: CPT | Mod: 91

## 2017-01-01 PROCEDURE — 0399T 2D ECHO WITH COLOR FLOW DOPPLER: CPT | Mod: ,,, | Performed by: INTERNAL MEDICINE

## 2017-01-01 PROCEDURE — 85730 THROMBOPLASTIN TIME PARTIAL: CPT | Mod: 91

## 2017-01-01 PROCEDURE — 27100094 HC IABP, SET-UP

## 2017-01-01 PROCEDURE — 83540 ASSAY OF IRON: CPT

## 2017-01-01 PROCEDURE — 86644 CMV ANTIBODY: CPT

## 2017-01-01 PROCEDURE — 81001 URINALYSIS AUTO W/SCOPE: CPT

## 2017-01-01 PROCEDURE — 80048 BASIC METABOLIC PNL TOTAL CA: CPT

## 2017-01-01 PROCEDURE — 94010 BREATHING CAPACITY TEST: CPT | Performed by: INTERNAL MEDICINE

## 2017-01-01 PROCEDURE — 87385 HISTOPLASMA CAPSUL AG IA: CPT

## 2017-01-01 PROCEDURE — 85246 CLOT FACTOR VIII VW ANTIGEN: CPT

## 2017-01-01 PROCEDURE — 84439 ASSAY OF FREE THYROXINE: CPT

## 2017-01-01 PROCEDURE — 85305 CLOT INHIBIT PROT S TOTAL: CPT

## 2017-01-01 PROCEDURE — 97116 GAIT TRAINING THERAPY: CPT

## 2017-01-01 PROCEDURE — 31624 DX BRONCHOSCOPE/LAVAGE: CPT | Mod: ,,, | Performed by: INTERNAL MEDICINE

## 2017-01-01 PROCEDURE — 83516 IMMUNOASSAY NONANTIBODY: CPT

## 2017-01-01 PROCEDURE — 94729 DIFFUSING CAPACITY: CPT | Performed by: INTERNAL MEDICINE

## 2017-01-01 PROCEDURE — 84145 PROCALCITONIN (PCT): CPT

## 2017-01-01 PROCEDURE — 4A023N6 MEASUREMENT OF CARDIAC SAMPLING AND PRESSURE, RIGHT HEART, PERCUTANEOUS APPROACH: ICD-10-PCS | Performed by: INTERNAL MEDICINE

## 2017-01-01 PROCEDURE — 82306 VITAMIN D 25 HYDROXY: CPT

## 2017-01-01 PROCEDURE — 86200 CCP ANTIBODY: CPT

## 2017-01-01 PROCEDURE — 85397 CLOTTING FUNCT ACTIVITY: CPT

## 2017-01-01 PROCEDURE — 90686 IIV4 VACC NO PRSV 0.5 ML IM: CPT | Performed by: PHYSICIAN ASSISTANT

## 2017-01-01 PROCEDURE — 90472 IMMUNIZATION ADMIN EACH ADD: CPT | Performed by: PHYSICIAN ASSISTANT

## 2017-01-01 PROCEDURE — 86825 HLA X-MATH NON-CYTOTOXIC: CPT

## 2017-01-01 PROCEDURE — 93451 RIGHT HEART CATH: CPT | Mod: 26,,, | Performed by: INTERNAL MEDICINE

## 2017-01-01 PROCEDURE — 86592 SYPHILIS TEST NON-TREP QUAL: CPT

## 2017-01-01 PROCEDURE — 99223 1ST HOSP IP/OBS HIGH 75: CPT | Mod: ,,, | Performed by: CLINICAL NURSE SPECIALIST

## 2017-01-01 PROCEDURE — 86480 TB TEST CELL IMMUN MEASURE: CPT

## 2017-01-01 PROCEDURE — 83615 LACTATE (LD) (LDH) ENZYME: CPT

## 2017-01-01 PROCEDURE — 80323 ALKALOIDS NOS: CPT

## 2017-01-01 PROCEDURE — C1751 CATH, INF, PER/CENT/MIDLINE: HCPCS

## 2017-01-01 PROCEDURE — 86905 BLOOD TYPING RBC ANTIGENS: CPT

## 2017-01-01 PROCEDURE — 86900 BLOOD TYPING SEROLOGIC ABO: CPT

## 2017-01-01 PROCEDURE — 90746 HEPB VACCINE 3 DOSE ADULT IM: CPT | Performed by: PHYSICIAN ASSISTANT

## 2017-01-01 PROCEDURE — 99222 1ST HOSP IP/OBS MODERATE 55: CPT | Mod: ,,, | Performed by: INTERNAL MEDICINE

## 2017-01-01 PROCEDURE — 86665 EPSTEIN-BARR CAPSID VCA: CPT

## 2017-01-01 PROCEDURE — 84484 ASSAY OF TROPONIN QUANT: CPT | Mod: 91

## 2017-01-01 PROCEDURE — 36600 WITHDRAWAL OF ARTERIAL BLOOD: CPT

## 2017-01-01 PROCEDURE — 80307 DRUG TEST PRSMV CHEM ANLYZR: CPT

## 2017-01-01 PROCEDURE — 97803 MED NUTRITION INDIV SUBSEQ: CPT

## 2017-01-01 PROCEDURE — 02HQ32Z INSERTION OF MONITORING DEVICE INTO RIGHT PULMONARY ARTERY, PERCUTANEOUS APPROACH: ICD-10-PCS | Performed by: INTERNAL MEDICINE

## 2017-01-01 PROCEDURE — 99232 SBSQ HOSP IP/OBS MODERATE 35: CPT | Mod: 25,,, | Performed by: INTERNAL MEDICINE

## 2017-01-01 PROCEDURE — 84132 ASSAY OF SERUM POTASSIUM: CPT

## 2017-01-01 PROCEDURE — 99223 1ST HOSP IP/OBS HIGH 75: CPT | Mod: ,,, | Performed by: PHYSICIAN ASSISTANT

## 2017-01-01 PROCEDURE — 85240 CLOT FACTOR VIII AHG 1 STAGE: CPT

## 2017-01-01 PROCEDURE — 87305 ASPERGILLUS AG IA: CPT

## 2017-01-01 PROCEDURE — 84466 ASSAY OF TRANSFERRIN: CPT

## 2017-01-01 PROCEDURE — 81373 HLA I TYPING 1 LOCUS LR: CPT

## 2017-01-01 PROCEDURE — 27200188 HC TRANSDUCER, ART ADULT/PEDS

## 2017-01-01 PROCEDURE — 86825 HLA X-MATH NON-CYTOTOXIC: CPT | Mod: 91

## 2017-01-01 PROCEDURE — 02H633Z INSERTION OF INFUSION DEVICE INTO RIGHT ATRIUM, PERCUTANEOUS APPROACH: ICD-10-PCS | Performed by: INTERNAL MEDICINE

## 2017-01-01 PROCEDURE — 99233 SBSQ HOSP IP/OBS HIGH 50: CPT | Mod: ,,, | Performed by: NURSE PRACTITIONER

## 2017-01-01 PROCEDURE — 81376 HLA II TYPING 1 LOCUS LR: CPT

## 2017-01-01 PROCEDURE — 87086 URINE CULTURE/COLONY COUNT: CPT

## 2017-01-01 PROCEDURE — 86803 HEPATITIS C AB TEST: CPT

## 2017-01-01 PROCEDURE — 76937 US GUIDE VASCULAR ACCESS: CPT

## 2017-01-01 PROCEDURE — 85384 FIBRINOGEN ACTIVITY: CPT

## 2017-01-01 PROCEDURE — 97161 PT EVAL LOW COMPLEX 20 MIN: CPT

## 2017-01-01 PROCEDURE — 94727 GAS DIL/WSHOT DETER LNG VOL: CPT | Performed by: INTERNAL MEDICINE

## 2017-01-01 PROCEDURE — 0B948ZX DRAINAGE OF RIGHT UPPER LOBE BRONCHUS, VIA NATURAL OR ARTIFICIAL OPENING ENDOSCOPIC, DIAGNOSTIC: ICD-10-PCS | Performed by: INTERNAL MEDICINE

## 2017-01-01 PROCEDURE — 86431 RHEUMATOID FACTOR QUANT: CPT

## 2017-01-01 PROCEDURE — 85651 RBC SED RATE NONAUTOMATED: CPT

## 2017-01-01 PROCEDURE — 86147 CARDIOLIPIN ANTIBODY EA IG: CPT | Mod: 59

## 2017-01-01 PROCEDURE — 84100 ASSAY OF PHOSPHORUS: CPT | Mod: 91

## 2017-01-01 PROCEDURE — 86255 FLUORESCENT ANTIBODY SCREEN: CPT | Mod: 91

## 2017-01-01 PROCEDURE — 97164 PT RE-EVAL EST PLAN CARE: CPT

## 2017-01-01 PROCEDURE — 86977 RBC SERUM PRETX INCUBJ/INHIB: CPT | Mod: 91

## 2017-01-01 RX ORDER — MIDAZOLAM HYDROCHLORIDE 1 MG/ML
1 INJECTION INTRAMUSCULAR; INTRAVENOUS ONCE
Status: COMPLETED | OUTPATIENT
Start: 2017-01-01 | End: 2017-01-01

## 2017-01-01 RX ORDER — ALUMINUM HYDROXIDE, MAGNESIUM HYDROXIDE, AND SIMETHICONE 2400; 240; 2400 MG/30ML; MG/30ML; MG/30ML
30 SUSPENSION ORAL EVERY 6 HOURS PRN
Status: DISCONTINUED | OUTPATIENT
Start: 2017-01-01 | End: 2017-01-28 | Stop reason: HOSPADM

## 2017-01-01 RX ORDER — MAGNESIUM SULFATE HEPTAHYDRATE 40 MG/ML
2 INJECTION, SOLUTION INTRAVENOUS ONCE
Status: COMPLETED | OUTPATIENT
Start: 2017-01-01 | End: 2017-01-01

## 2017-01-01 RX ORDER — NITROGLYCERIN 0.4 MG/1
0.4 TABLET SUBLINGUAL EVERY 5 MIN PRN
Status: DISCONTINUED | OUTPATIENT
Start: 2017-01-01 | End: 2017-01-28 | Stop reason: HOSPADM

## 2017-01-01 RX ORDER — HEPARIN SODIUM,PORCINE/D5W 25000/250
13 INTRAVENOUS SOLUTION INTRAVENOUS CONTINUOUS
Status: DISCONTINUED | OUTPATIENT
Start: 2017-01-01 | End: 2017-01-01

## 2017-01-01 RX ORDER — ONDANSETRON 2 MG/ML
8 INJECTION INTRAMUSCULAR; INTRAVENOUS EVERY 8 HOURS PRN
Status: DISCONTINUED | OUTPATIENT
Start: 2017-01-01 | End: 2017-01-01

## 2017-01-01 RX ORDER — GLYCOPYRROLATE 0.2 MG/ML
0.1 INJECTION INTRAMUSCULAR; INTRAVENOUS
Status: DISCONTINUED | OUTPATIENT
Start: 2017-01-01 | End: 2017-01-28 | Stop reason: HOSPADM

## 2017-01-01 RX ORDER — WARFARIN SODIUM 5 MG/1
5 TABLET ORAL DAILY
Status: DISCONTINUED | OUTPATIENT
Start: 2017-01-01 | End: 2017-01-01

## 2017-01-01 RX ORDER — ONDANSETRON 2 MG/ML
8 INJECTION INTRAMUSCULAR; INTRAVENOUS EVERY 6 HOURS PRN
Status: DISCONTINUED | OUTPATIENT
Start: 2017-01-01 | End: 2017-01-01

## 2017-01-01 RX ORDER — HEPARIN SODIUM,PORCINE/D5W 25000/250
17 INTRAVENOUS SOLUTION INTRAVENOUS CONTINUOUS
Status: ACTIVE | OUTPATIENT
Start: 2017-01-01 | End: 2017-01-01

## 2017-01-01 RX ORDER — LISINOPRIL 5 MG/1
5 TABLET ORAL DAILY
Status: DISCONTINUED | OUTPATIENT
Start: 2017-01-01 | End: 2017-01-01

## 2017-01-01 RX ORDER — ACETAMINOPHEN 325 MG/1
650 TABLET ORAL ONCE
Status: COMPLETED | OUTPATIENT
Start: 2017-01-01 | End: 2017-01-01

## 2017-01-01 RX ORDER — SPIRONOLACTONE 25 MG/1
25 TABLET ORAL DAILY
Status: DISCONTINUED | OUTPATIENT
Start: 2017-01-01 | End: 2017-01-01

## 2017-01-01 RX ORDER — FUROSEMIDE 20 MG/1
20 TABLET ORAL DAILY
Status: DISCONTINUED | OUTPATIENT
Start: 2017-01-01 | End: 2017-01-01

## 2017-01-01 RX ORDER — POLYETHYLENE GLYCOL 3350, SODIUM SULFATE ANHYDROUS, SODIUM BICARBONATE, SODIUM CHLORIDE, POTASSIUM CHLORIDE 236; 22.74; 6.74; 5.86; 2.97 G/4L; G/4L; G/4L; G/4L; G/4L
4000 POWDER, FOR SOLUTION ORAL ONCE
Status: DISCONTINUED | OUTPATIENT
Start: 2017-01-01 | End: 2017-01-01

## 2017-01-01 RX ORDER — FUROSEMIDE 10 MG/ML
80 INJECTION INTRAMUSCULAR; INTRAVENOUS ONCE
Status: COMPLETED | OUTPATIENT
Start: 2017-01-01 | End: 2017-01-01

## 2017-01-01 RX ORDER — BENZONATATE 100 MG/1
100 CAPSULE ORAL 3 TIMES DAILY PRN
Status: DISCONTINUED | OUTPATIENT
Start: 2017-01-01 | End: 2017-01-28 | Stop reason: HOSPADM

## 2017-01-01 RX ORDER — INSULIN ASPART 100 [IU]/ML
0-5 INJECTION, SOLUTION INTRAVENOUS; SUBCUTANEOUS
Status: DISCONTINUED | OUTPATIENT
Start: 2017-01-01 | End: 2017-01-28 | Stop reason: HOSPADM

## 2017-01-01 RX ORDER — AMOXICILLIN 250 MG
1 CAPSULE ORAL 2 TIMES DAILY
Status: DISCONTINUED | OUTPATIENT
Start: 2017-01-01 | End: 2017-01-01

## 2017-01-01 RX ORDER — SUCCINYLCHOLINE CHLORIDE 20 MG/ML
INJECTION INTRAMUSCULAR; INTRAVENOUS
Status: DISPENSED
Start: 2017-01-01 | End: 2017-01-01

## 2017-01-01 RX ORDER — SODIUM CHLORIDE 0.9 % (FLUSH) 0.9 %
10 SYRINGE (ML) INJECTION EVERY 6 HOURS
Status: DISCONTINUED | OUTPATIENT
Start: 2017-01-01 | End: 2017-01-01

## 2017-01-01 RX ORDER — LIDOCAINE HYDROCHLORIDE 10 MG/ML
INJECTION INFILTRATION; PERINEURAL
Status: COMPLETED
Start: 2017-01-01 | End: 2017-01-01

## 2017-01-01 RX ORDER — PANTOPRAZOLE SODIUM 40 MG/1
40 TABLET, DELAYED RELEASE ORAL DAILY
Status: DISCONTINUED | OUTPATIENT
Start: 2017-01-01 | End: 2017-01-01

## 2017-01-01 RX ORDER — LANOLIN ALCOHOL/MO/W.PET/CERES
400 CREAM (GRAM) TOPICAL 2 TIMES DAILY
Status: DISCONTINUED | OUTPATIENT
Start: 2017-01-01 | End: 2017-01-01

## 2017-01-01 RX ORDER — FUROSEMIDE 80 MG/1
80 TABLET ORAL 3 TIMES DAILY
Status: DISCONTINUED | OUTPATIENT
Start: 2017-01-01 | End: 2017-01-01

## 2017-01-01 RX ORDER — DEXMEDETOMIDINE HYDROCHLORIDE 4 UG/ML
0.2 INJECTION, SOLUTION INTRAVENOUS CONTINUOUS
Status: DISCONTINUED | OUTPATIENT
Start: 2017-01-01 | End: 2017-01-01

## 2017-01-01 RX ORDER — DOBUTAMINE HYDROCHLORIDE 400 MG/100ML
4 INJECTION INTRAVENOUS CONTINUOUS
Status: DISCONTINUED | OUTPATIENT
Start: 2017-01-01 | End: 2017-01-01

## 2017-01-01 RX ORDER — POTASSIUM CHLORIDE 750 MG/1
40 CAPSULE, EXTENDED RELEASE ORAL ONCE
Status: COMPLETED | OUTPATIENT
Start: 2017-01-01 | End: 2017-01-01

## 2017-01-01 RX ORDER — DOBUTAMINE HYDROCHLORIDE 400 MG/100ML
2.5 INJECTION, SOLUTION INTRAVENOUS CONTINUOUS
Status: DISCONTINUED | OUTPATIENT
Start: 2017-01-01 | End: 2017-01-01

## 2017-01-01 RX ORDER — CEFEPIME HYDROCHLORIDE 1 G/50ML
1 INJECTION, SOLUTION INTRAVENOUS
Status: DISCONTINUED | OUTPATIENT
Start: 2017-01-01 | End: 2017-01-01

## 2017-01-01 RX ORDER — POTASSIUM CHLORIDE 29.8 MG/ML
40 INJECTION INTRAVENOUS ONCE
Status: COMPLETED | OUTPATIENT
Start: 2017-01-01 | End: 2017-01-01

## 2017-01-01 RX ORDER — IVERMECTIN 3 MG/1
200 TABLET ORAL ONCE
Status: COMPLETED | OUTPATIENT
Start: 2017-01-01 | End: 2017-01-01

## 2017-01-01 RX ORDER — SODIUM CHLORIDE 0.9 % (FLUSH) 0.9 %
3 SYRINGE (ML) INJECTION EVERY 8 HOURS
Status: DISCONTINUED | OUTPATIENT
Start: 2017-01-01 | End: 2017-01-01

## 2017-01-01 RX ORDER — SODIUM CHLORIDE 0.9 % (FLUSH) 0.9 %
10 SYRINGE (ML) INJECTION
Status: DISCONTINUED | OUTPATIENT
Start: 2017-01-01 | End: 2017-01-01

## 2017-01-01 RX ORDER — PROPOFOL 10 MG/ML
INJECTION, EMULSION INTRAVENOUS
Status: DISPENSED
Start: 2017-01-01 | End: 2017-01-01

## 2017-01-01 RX ORDER — FLUCONAZOLE 2 MG/ML
400 INJECTION, SOLUTION INTRAVENOUS
Status: DISCONTINUED | OUTPATIENT
Start: 2017-01-01 | End: 2017-01-01

## 2017-01-01 RX ORDER — POTASSIUM CHLORIDE 20 MEQ/15ML
40 SOLUTION ORAL ONCE
Status: COMPLETED | OUTPATIENT
Start: 2017-01-01 | End: 2017-01-01

## 2017-01-01 RX ORDER — ONDANSETRON 2 MG/ML
INJECTION INTRAMUSCULAR; INTRAVENOUS
Status: DISPENSED
Start: 2017-01-01 | End: 2017-01-01

## 2017-01-01 RX ORDER — ACETAMINOPHEN 325 MG/1
650 TABLET ORAL EVERY 6 HOURS PRN
Status: DISCONTINUED | OUTPATIENT
Start: 2017-01-01 | End: 2017-01-28 | Stop reason: HOSPADM

## 2017-01-01 RX ORDER — FAMOTIDINE 20 MG/1
20 TABLET, FILM COATED ORAL 2 TIMES DAILY
Status: DISCONTINUED | OUTPATIENT
Start: 2017-01-01 | End: 2017-01-01

## 2017-01-01 RX ORDER — PROPOFOL 10 MG/ML
INJECTION, EMULSION INTRAVENOUS
Status: COMPLETED
Start: 2017-01-01 | End: 2017-01-01

## 2017-01-01 RX ORDER — CALCIUM CARBONATE 200(500)MG
500 TABLET,CHEWABLE ORAL DAILY PRN
Status: DISCONTINUED | OUTPATIENT
Start: 2017-01-01 | End: 2017-01-28 | Stop reason: HOSPADM

## 2017-01-01 RX ORDER — CHLORHEXIDINE GLUCONATE ORAL RINSE 1.2 MG/ML
15 SOLUTION DENTAL 2 TIMES DAILY
Status: DISCONTINUED | OUTPATIENT
Start: 2017-01-01 | End: 2017-01-01

## 2017-01-01 RX ORDER — PROPOFOL 10 MG/ML
5 INJECTION, EMULSION INTRAVENOUS CONTINUOUS
Status: DISCONTINUED | OUTPATIENT
Start: 2017-01-01 | End: 2017-01-28 | Stop reason: HOSPADM

## 2017-01-01 RX ORDER — FUROSEMIDE 40 MG/1
40 TABLET ORAL 2 TIMES DAILY
Status: DISCONTINUED | OUTPATIENT
Start: 2017-01-01 | End: 2017-01-01

## 2017-01-01 RX ORDER — DOBUTAMINE HYDROCHLORIDE 400 MG/100ML
5 INJECTION, SOLUTION INTRAVENOUS CONTINUOUS
Status: DISCONTINUED | OUTPATIENT
Start: 2017-01-01 | End: 2017-01-01

## 2017-01-01 RX ORDER — ETOMIDATE 2 MG/ML
INJECTION INTRAVENOUS
Status: COMPLETED
Start: 2017-01-01 | End: 2017-01-01

## 2017-01-01 RX ORDER — HEPARIN SODIUM,PORCINE/D5W 25000/250
17 INTRAVENOUS SOLUTION INTRAVENOUS CONTINUOUS
Status: DISCONTINUED | OUTPATIENT
Start: 2017-01-01 | End: 2017-01-01

## 2017-01-01 RX ORDER — FUROSEMIDE 10 MG/ML
20 INJECTION INTRAMUSCULAR; INTRAVENOUS ONCE
Status: COMPLETED | OUTPATIENT
Start: 2017-01-01 | End: 2017-01-01

## 2017-01-01 RX ORDER — ATROPINE SULFATE 10 MG/ML
2 SOLUTION/ DROPS OPHTHALMIC EVERY 4 HOURS PRN
Status: DISCONTINUED | OUTPATIENT
Start: 2017-01-01 | End: 2017-01-28 | Stop reason: HOSPADM

## 2017-01-01 RX ORDER — ETOMIDATE 2 MG/ML
12 INJECTION INTRAVENOUS ONCE
Status: COMPLETED | OUTPATIENT
Start: 2017-01-01 | End: 2017-01-01

## 2017-01-01 RX ORDER — MIDAZOLAM HYDROCHLORIDE 1 MG/ML
INJECTION INTRAMUSCULAR; INTRAVENOUS
Status: COMPLETED
Start: 2017-01-01 | End: 2017-01-01

## 2017-01-01 RX ORDER — ONDANSETRON 2 MG/ML
8 INJECTION INTRAMUSCULAR; INTRAVENOUS EVERY 6 HOURS PRN
Status: DISCONTINUED | OUTPATIENT
Start: 2017-01-01 | End: 2017-01-28 | Stop reason: HOSPADM

## 2017-01-01 RX ORDER — FUROSEMIDE 80 MG/1
80 TABLET ORAL ONCE
Status: DISCONTINUED | OUTPATIENT
Start: 2017-01-01 | End: 2017-01-01

## 2017-01-01 RX ORDER — HEPARIN SODIUM 10000 [USP'U]/100ML
17 INJECTION, SOLUTION INTRAVENOUS CONTINUOUS
Status: DISCONTINUED | OUTPATIENT
Start: 2017-01-01 | End: 2017-01-01

## 2017-01-01 RX ORDER — LIDOCAINE HYDROCHLORIDE 10 MG/ML
1 INJECTION INFILTRATION; PERINEURAL ONCE
Status: COMPLETED | OUTPATIENT
Start: 2017-01-01 | End: 2017-01-01

## 2017-01-01 RX ORDER — CETIRIZINE HYDROCHLORIDE 5 MG/1
5 TABLET ORAL DAILY
Status: DISCONTINUED | OUTPATIENT
Start: 2017-01-01 | End: 2017-01-01

## 2017-01-01 RX ORDER — DIPHENHYDRAMINE HCL 25 MG
25 CAPSULE ORAL EVERY 6 HOURS PRN
Status: DISCONTINUED | OUTPATIENT
Start: 2017-01-01 | End: 2017-01-28 | Stop reason: HOSPADM

## 2017-01-01 RX ORDER — DIPHENHYDRAMINE HCL 25 MG
25 CAPSULE ORAL EVERY 6 HOURS PRN
Status: DISCONTINUED | OUTPATIENT
Start: 2017-01-01 | End: 2017-01-01

## 2017-01-01 RX ORDER — LORAZEPAM 2 MG/ML
0.5 INJECTION INTRAMUSCULAR EVERY 30 MIN PRN
Status: DISCONTINUED | OUTPATIENT
Start: 2017-01-01 | End: 2017-01-28 | Stop reason: HOSPADM

## 2017-01-01 RX ORDER — FENTANYL CITRAT/DEXTROSE 5%/PF 100 MCG/10
PATIENT CONTROLLED ANALGESIA SYRINGE INTRAVENOUS CONTINUOUS
Status: DISCONTINUED | OUTPATIENT
Start: 2017-01-01 | End: 2017-01-28 | Stop reason: HOSPADM

## 2017-01-01 RX ORDER — POTASSIUM CHLORIDE 20 MEQ/1
60 TABLET, EXTENDED RELEASE ORAL ONCE
Status: COMPLETED | OUTPATIENT
Start: 2017-01-01 | End: 2017-01-01

## 2017-01-01 RX ORDER — POTASSIUM CHLORIDE 20 MEQ/1
40 TABLET, EXTENDED RELEASE ORAL ONCE
Status: COMPLETED | OUTPATIENT
Start: 2017-01-01 | End: 2017-01-01

## 2017-01-01 RX ORDER — AMIODARONE HYDROCHLORIDE 200 MG/1
200 TABLET ORAL 2 TIMES DAILY
Status: DISCONTINUED | OUTPATIENT
Start: 2017-01-01 | End: 2017-01-01

## 2017-01-01 RX ORDER — HEPARIN SODIUM,PORCINE/D5W 25000/250
17 INTRAVENOUS SOLUTION INTRAVENOUS CONTINUOUS
Status: DISCONTINUED | OUTPATIENT
Start: 2017-01-01 | End: 2017-01-01 | Stop reason: DRUGHIGH

## 2017-01-01 RX ORDER — PANTOPRAZOLE SODIUM 40 MG/10ML
40 INJECTION, POWDER, LYOPHILIZED, FOR SOLUTION INTRAVENOUS DAILY
Status: DISCONTINUED | OUTPATIENT
Start: 2017-01-01 | End: 2017-01-01

## 2017-01-01 RX ORDER — FUROSEMIDE 80 MG/1
80 TABLET ORAL 2 TIMES DAILY
Status: DISCONTINUED | OUTPATIENT
Start: 2017-01-01 | End: 2017-01-01

## 2017-01-01 RX ORDER — ROCURONIUM BROMIDE 10 MG/ML
INJECTION, SOLUTION INTRAVENOUS
Status: DISPENSED
Start: 2017-01-01 | End: 2017-01-01

## 2017-01-01 RX ORDER — TRAZODONE HYDROCHLORIDE 50 MG/1
50 TABLET ORAL NIGHTLY
Status: DISCONTINUED | OUTPATIENT
Start: 2017-01-01 | End: 2017-01-01

## 2017-01-01 RX ORDER — LISINOPRIL 2.5 MG/1
2.5 TABLET ORAL DAILY
Status: DISCONTINUED | OUTPATIENT
Start: 2017-01-01 | End: 2017-01-01

## 2017-01-01 RX ADMIN — CEFEPIME HYDROCHLORIDE 1 G: 1 INJECTION, POWDER, FOR SOLUTION INTRAMUSCULAR; INTRAVENOUS at 12:01

## 2017-01-01 RX ADMIN — AMIODARONE HYDROCHLORIDE 0.5 MG/MIN: 1.8 INJECTION, SOLUTION INTRAVENOUS at 11:01

## 2017-01-01 RX ADMIN — Medication 10 ML: at 12:01

## 2017-01-01 RX ADMIN — HEPARIN SODIUM AND DEXTROSE 21 UNITS/KG/HR: 10000; 5 INJECTION INTRAVENOUS at 07:01

## 2017-01-01 RX ADMIN — HEPARIN SODIUM AND DEXTROSE 23 UNITS/KG/HR: 10000; 5 INJECTION INTRAVENOUS at 11:01

## 2017-01-01 RX ADMIN — MAGNESIUM OXIDE TAB 400 MG (241.3 MG ELEMENTAL MG) 400 MG: 400 (241.3 MG) TAB at 08:01

## 2017-01-01 RX ADMIN — Medication 3 ML: at 01:01

## 2017-01-01 RX ADMIN — HEPARIN SODIUM AND DEXTROSE 20 UNITS/KG/HR: 10000; 5 INJECTION INTRAVENOUS at 04:01

## 2017-01-01 RX ADMIN — PROMETHAZINE HYDROCHLORIDE 12.5 MG: 25 INJECTION INTRAMUSCULAR; INTRAVENOUS at 11:01

## 2017-01-01 RX ADMIN — CEFTRIAXONE 2 G: 2 INJECTION, SOLUTION INTRAVENOUS at 05:01

## 2017-01-01 RX ADMIN — FUROSEMIDE 20 MG: 20 TABLET ORAL at 08:01

## 2017-01-01 RX ADMIN — DOBUTAMINE IN DEXTROSE 2.5 MCG/KG/MIN: 200 INJECTION, SOLUTION INTRAVENOUS at 08:01

## 2017-01-01 RX ADMIN — Medication 3 ML: at 06:01

## 2017-01-01 RX ADMIN — PROPOFOL 20 MCG/KG/MIN: 10 INJECTION, EMULSION INTRAVENOUS at 10:01

## 2017-01-01 RX ADMIN — CALCIUM CARBONATE (ANTACID) CHEW TAB 500 MG 500 MG: 500 CHEW TAB at 06:01

## 2017-01-01 RX ADMIN — HEPARIN SODIUM AND DEXTROSE 21 UNITS/KG/HR: 10000; 5 INJECTION INTRAVENOUS at 11:01

## 2017-01-01 RX ADMIN — PANTOPRAZOLE SODIUM 40 MG: 40 TABLET, DELAYED RELEASE ORAL at 08:01

## 2017-01-01 RX ADMIN — FAMOTIDINE 20 MG: 20 TABLET, FILM COATED ORAL at 09:01

## 2017-01-01 RX ADMIN — Medication 10 ML: at 05:01

## 2017-01-01 RX ADMIN — PROMETHAZINE HYDROCHLORIDE 12.5 MG: 25 INJECTION INTRAMUSCULAR; INTRAVENOUS at 01:01

## 2017-01-01 RX ADMIN — ALUMINUM HYDROXIDE, MAGNESIUM HYDROXIDE, AND DIMETHICONE 30 ML: 400; 400; 40 SUSPENSION ORAL at 03:01

## 2017-01-01 RX ADMIN — SPIRONOLACTONE 12.5 MG: 25 TABLET, FILM COATED ORAL at 08:01

## 2017-01-01 RX ADMIN — NITROGLYCERIN 0.4 MG: 0.4 TABLET SUBLINGUAL at 10:01

## 2017-01-01 RX ADMIN — Medication 3 ML: at 05:01

## 2017-01-01 RX ADMIN — LISINOPRIL 5 MG: 5 TABLET ORAL at 08:01

## 2017-01-01 RX ADMIN — Medication 10 ML: at 06:01

## 2017-01-01 RX ADMIN — TRAZODONE HYDROCHLORIDE 25 MG: 50 TABLET ORAL at 08:01

## 2017-01-01 RX ADMIN — DOBUTAMINE IN DEXTROSE 2.5 MCG/KG/MIN: 200 INJECTION, SOLUTION INTRAVENOUS at 05:01

## 2017-01-01 RX ADMIN — Medication 10 ML: at 02:01

## 2017-01-01 RX ADMIN — STANDARDIZED SENNA CONCENTRATE AND DOCUSATE SODIUM 1 TABLET: 8.6; 5 TABLET, FILM COATED ORAL at 08:01

## 2017-01-01 RX ADMIN — Medication 3 ML: at 09:01

## 2017-01-01 RX ADMIN — DIPHENHYDRAMINE HYDROCHLORIDE 25 MG: 25 CAPSULE ORAL at 03:01

## 2017-01-01 RX ADMIN — Medication 10 ML: at 11:01

## 2017-01-01 RX ADMIN — HEPARIN SODIUM AND DEXTROSE 20 UNITS/KG/HR: 10000; 5 INJECTION INTRAVENOUS at 02:01

## 2017-01-01 RX ADMIN — DIPHENHYDRAMINE HYDROCHLORIDE 25 MG: 25 CAPSULE ORAL at 10:01

## 2017-01-01 RX ADMIN — DOBUTAMINE IN DEXTROSE 2.5 MCG/KG/MIN: 200 INJECTION, SOLUTION INTRAVENOUS at 02:01

## 2017-01-01 RX ADMIN — DOBUTAMINE IN DEXTROSE 2.5 MCG/KG/MIN: 200 INJECTION, SOLUTION INTRAVENOUS at 01:01

## 2017-01-01 RX ADMIN — INFLUENZA A VIRUS A/CALIFORNIA/7/2009 X-179A (H1N1) ANTIGEN (FORMALDEHYDE INACTIVATED), INFLUENZA A VIRUS A/HONG KONG/4801/2014 X-263B (H3N2) ANTIGEN (FORMALDEHYDE INACTIVATED), INFLUENZA B VIRUS B/PHUKET/3073/2013 ANTIGEN (FORMALDEHYDE INACTIVATED), AND INFLUENZA B VIRUS B/BRISBANE/60/2008 ANTIGEN (FORMALDEHYDE INACTIVATED) 0.5 ML: 15; 15; 15; 15 INJECTION, SUSPENSION INTRAMUSCULAR at 06:01

## 2017-01-01 RX ADMIN — HEPARIN SODIUM AND DEXTROSE 23 UNITS/KG/HR: 10000; 5 INJECTION INTRAVENOUS at 12:01

## 2017-01-01 RX ADMIN — FUROSEMIDE 80 MG: 10 INJECTION, SOLUTION INTRAMUSCULAR; INTRAVENOUS at 12:01

## 2017-01-01 RX ADMIN — Medication 3 ML: at 08:01

## 2017-01-01 RX ADMIN — TRAZODONE HYDROCHLORIDE 50 MG: 50 TABLET ORAL at 08:01

## 2017-01-01 RX ADMIN — POTASSIUM CHLORIDE 40 MEQ: 750 CAPSULE, EXTENDED RELEASE ORAL at 03:01

## 2017-01-01 RX ADMIN — CALCIUM CARBONATE (ANTACID) CHEW TAB 500 MG 500 MG: 500 CHEW TAB at 04:01

## 2017-01-01 RX ADMIN — ONDANSETRON 8 MG: 2 INJECTION INTRAMUSCULAR; INTRAVENOUS at 08:01

## 2017-01-01 RX ADMIN — CHLORHEXIDINE GLUCONATE 15 ML: 1.2 RINSE ORAL at 08:01

## 2017-01-01 RX ADMIN — CETIRIZINE HYDROCHLORIDE 5 MG: 5 TABLET, FILM COATED ORAL at 08:01

## 2017-01-01 RX ADMIN — DIPHENHYDRAMINE HYDROCHLORIDE 25 MG: 25 CAPSULE ORAL at 11:01

## 2017-01-01 RX ADMIN — PNEUMOCOCCAL 13-VALENT CONJUGATE VACCINE 0.5 ML: 2.2; 2.2; 2.2; 2.2; 2.2; 4.4; 2.2; 2.2; 2.2; 2.2; 2.2; 2.2; 2.2 INJECTION, SUSPENSION INTRAMUSCULAR at 06:01

## 2017-01-01 RX ADMIN — DOBUTAMINE IN DEXTROSE 5 MCG/KG/MIN: 400 INJECTION, SOLUTION INTRAVENOUS at 01:01

## 2017-01-01 RX ADMIN — Medication 25 MCG: at 06:01

## 2017-01-01 RX ADMIN — DOBUTAMINE IN DEXTROSE 2.5 MCG/KG/MIN: 200 INJECTION, SOLUTION INTRAVENOUS at 04:01

## 2017-01-01 RX ADMIN — SPIRONOLACTONE 25 MG: 25 TABLET, FILM COATED ORAL at 08:01

## 2017-01-01 RX ADMIN — Medication 3 ML: at 02:01

## 2017-01-01 RX ADMIN — PIPERACILLIN AND TAZOBACTAM 4.5 G: 4; .5 INJECTION, POWDER, LYOPHILIZED, FOR SOLUTION INTRAVENOUS; PARENTERAL at 01:01

## 2017-01-01 RX ADMIN — MAGNESIUM SULFATE HEPTAHYDRATE 1 G: 500 INJECTION, SOLUTION INTRAMUSCULAR; INTRAVENOUS at 08:01

## 2017-01-01 RX ADMIN — MORPHINE SULFATE 0.5 MG/HR: 10 INJECTION INTRAMUSCULAR; INTRAVENOUS; SUBCUTANEOUS at 12:01

## 2017-01-01 RX ADMIN — ACETAMINOPHEN 650 MG: 325 TABLET ORAL at 08:01

## 2017-01-01 RX ADMIN — PROPOFOL 25 MCG/KG/MIN: 10 INJECTION, EMULSION INTRAVENOUS at 02:01

## 2017-01-01 RX ADMIN — FUROSEMIDE 40 MG: 40 TABLET ORAL at 05:01

## 2017-01-01 RX ADMIN — FUROSEMIDE 40 MG: 40 TABLET ORAL at 08:01

## 2017-01-01 RX ADMIN — PROMETHAZINE HYDROCHLORIDE 12.5 MG: 25 INJECTION INTRAMUSCULAR; INTRAVENOUS at 07:01

## 2017-01-01 RX ADMIN — MAGNESIUM SULFATE HEPTAHYDRATE 1 G: 500 INJECTION, SOLUTION INTRAMUSCULAR; INTRAVENOUS at 11:01

## 2017-01-01 RX ADMIN — INSULIN ASPART 2 UNITS: 100 INJECTION, SOLUTION INTRAVENOUS; SUBCUTANEOUS at 12:01

## 2017-01-01 RX ADMIN — HEPARIN SODIUM AND DEXTROSE 15 UNITS/KG/HR: 10000; 5 INJECTION INTRAVENOUS at 10:01

## 2017-01-01 RX ADMIN — HEPARIN SODIUM AND DEXTROSE 24 UNITS/KG/HR: 10000; 5 INJECTION INTRAVENOUS at 04:01

## 2017-01-01 RX ADMIN — PANTOPRAZOLE SODIUM 40 MG: 40 INJECTION, POWDER, FOR SOLUTION INTRAVENOUS at 08:01

## 2017-01-01 RX ADMIN — MINERAL OIL AND WHITE PETROLATUM: 150; 830 OINTMENT OPHTHALMIC at 08:01

## 2017-01-01 RX ADMIN — HEPARIN SODIUM AND DEXTROSE 19 UNITS/KG/HR: 10000; 5 INJECTION INTRAVENOUS at 12:01

## 2017-01-01 RX ADMIN — Medication 10 ML: at 03:01

## 2017-01-01 RX ADMIN — HEPARIN SODIUM AND DEXTROSE 15 UNITS/KG/HR: 10000; 5 INJECTION INTRAVENOUS at 08:01

## 2017-01-01 RX ADMIN — PROPOFOL 30 MCG/KG/MIN: 10 INJECTION, EMULSION INTRAVENOUS at 06:01

## 2017-01-01 RX ADMIN — ONDANSETRON 8 MG: 2 INJECTION INTRAMUSCULAR; INTRAVENOUS at 12:01

## 2017-01-01 RX ADMIN — ATROPINE SULFATE 2 DROP: 10 SOLUTION/ DROPS OPHTHALMIC at 04:01

## 2017-01-01 RX ADMIN — HEPARIN SODIUM AND DEXTROSE 21 UNITS/KG/HR: 10000; 5 INJECTION INTRAVENOUS at 05:01

## 2017-01-01 RX ADMIN — ETOMIDATE 12 MG: 2 INJECTION INTRAVENOUS at 05:01

## 2017-01-01 RX ADMIN — HEPARIN SODIUM AND DEXTROSE 17 UNITS/KG/HR: 10000; 5 INJECTION INTRAVENOUS at 04:01

## 2017-01-01 RX ADMIN — HEPARIN SODIUM AND DEXTROSE 19 UNITS/KG/HR: 10000; 5 INJECTION INTRAVENOUS at 06:01

## 2017-01-01 RX ADMIN — CALCIUM CARBONATE (ANTACID) CHEW TAB 500 MG 500 MG: 500 CHEW TAB at 09:01

## 2017-01-01 RX ADMIN — AMIODARONE HYDROCHLORIDE 1 MG/MIN: 1.8 INJECTION, SOLUTION INTRAVENOUS at 05:01

## 2017-01-01 RX ADMIN — FAMOTIDINE 20 MG: 20 TABLET, FILM COATED ORAL at 08:01

## 2017-01-01 RX ADMIN — AMIODARONE HYDROCHLORIDE 200 MG: 200 TABLET ORAL at 10:01

## 2017-01-01 RX ADMIN — VANCOMYCIN HYDROCHLORIDE 1000 MG: 1 INJECTION, POWDER, LYOPHILIZED, FOR SOLUTION INTRAVENOUS at 11:01

## 2017-01-01 RX ADMIN — PROPOFOL 20 MCG/KG/MIN: 10 INJECTION, EMULSION INTRAVENOUS at 05:01

## 2017-01-01 RX ADMIN — INSULIN ASPART 2 UNITS: 100 INJECTION, SOLUTION INTRAVENOUS; SUBCUTANEOUS at 06:01

## 2017-01-01 RX ADMIN — LORAZEPAM 0.5 MG: 2 INJECTION INTRAMUSCULAR; INTRAVENOUS at 07:01

## 2017-01-01 RX ADMIN — ACETAMINOPHEN 650 MG: 325 TABLET ORAL at 03:01

## 2017-01-01 RX ADMIN — DOBUTAMINE IN DEXTROSE 5 MCG/KG/MIN: 200 INJECTION, SOLUTION INTRAVENOUS at 02:01

## 2017-01-01 RX ADMIN — POTASSIUM CHLORIDE 60 MEQ: 1500 TABLET, EXTENDED RELEASE ORAL at 06:01

## 2017-01-01 RX ADMIN — VANCOMYCIN HYDROCHLORIDE 1000 MG: 1 INJECTION, POWDER, LYOPHILIZED, FOR SOLUTION INTRAVENOUS at 01:01

## 2017-01-01 RX ADMIN — DIPHENHYDRAMINE HYDROCHLORIDE 25 MG: 25 CAPSULE ORAL at 05:01

## 2017-01-01 RX ADMIN — VANCOMYCIN HYDROCHLORIDE 1000 MG: 1 INJECTION, POWDER, LYOPHILIZED, FOR SOLUTION INTRAVENOUS at 12:01

## 2017-01-01 RX ADMIN — ACETAMINOPHEN 650 MG: 325 TABLET ORAL at 11:01

## 2017-01-01 RX ADMIN — PROMETHAZINE HYDROCHLORIDE 12.5 MG: 25 INJECTION INTRAMUSCULAR; INTRAVENOUS at 12:01

## 2017-01-01 RX ADMIN — SODIUM CHLORIDE 250 ML: 0.9 INJECTION, SOLUTION INTRAVENOUS at 02:01

## 2017-01-01 RX ADMIN — DOBUTAMINE IN DEXTROSE 2.5 MCG/KG/MIN: 200 INJECTION, SOLUTION INTRAVENOUS at 12:01

## 2017-01-01 RX ADMIN — PROMETHAZINE HYDROCHLORIDE 12.5 MG: 25 INJECTION INTRAMUSCULAR; INTRAVENOUS at 09:01

## 2017-01-01 RX ADMIN — FUROSEMIDE 20 MG: 20 TABLET ORAL at 09:01

## 2017-01-01 RX ADMIN — CETIRIZINE HYDROCHLORIDE 5 MG: 5 TABLET, FILM COATED ORAL at 10:01

## 2017-01-01 RX ADMIN — EPINEPHRINE 0.04 MCG/KG/MIN: 1 INJECTION PARENTERAL at 09:01

## 2017-01-01 RX ADMIN — CETIRIZINE HYDROCHLORIDE 5 MG: 5 TABLET, FILM COATED ORAL at 11:01

## 2017-01-01 RX ADMIN — FUROSEMIDE 80 MG: 10 INJECTION, SOLUTION INTRAMUSCULAR; INTRAVENOUS at 08:01

## 2017-01-01 RX ADMIN — Medication 3 ML: at 10:01

## 2017-01-01 RX ADMIN — TRAZODONE HYDROCHLORIDE 50 MG: 50 TABLET ORAL at 09:01

## 2017-01-01 RX ADMIN — HEPARIN SODIUM AND DEXTROSE 23 UNITS/KG/HR: 10000; 5 INJECTION INTRAVENOUS at 06:01

## 2017-01-01 RX ADMIN — POTASSIUM CHLORIDE 40 MEQ: 1500 TABLET, EXTENDED RELEASE ORAL at 08:01

## 2017-01-01 RX ADMIN — HEPARIN SODIUM AND DEXTROSE 23 UNITS/KG/HR: 10000; 5 INJECTION INTRAVENOUS at 08:01

## 2017-01-01 RX ADMIN — WARFARIN SODIUM 5 MG: 5 TABLET ORAL at 05:01

## 2017-01-01 RX ADMIN — ETOMIDATE 12 MG: 2 INJECTION, SOLUTION INTRAVENOUS at 05:01

## 2017-01-01 RX ADMIN — HEPARIN SODIUM AND DEXTROSE 19 UNITS/KG/HR: 10000; 5 INJECTION INTRAVENOUS at 01:01

## 2017-01-01 RX ADMIN — MAGNESIUM OXIDE TAB 400 MG (241.3 MG ELEMENTAL MG) 400 MG: 400 (241.3 MG) TAB at 09:01

## 2017-01-01 RX ADMIN — PROPOFOL 20 MCG/KG/MIN: 10 INJECTION, EMULSION INTRAVENOUS at 03:01

## 2017-01-01 RX ADMIN — PROMETHAZINE HYDROCHLORIDE 12.5 MG: 25 INJECTION INTRAMUSCULAR; INTRAVENOUS at 04:01

## 2017-01-01 RX ADMIN — CALCIUM CARBONATE (ANTACID) CHEW TAB 500 MG 500 MG: 500 CHEW TAB at 07:01

## 2017-01-01 RX ADMIN — SODIUM CHLORIDE 5 MG/HR: 900 INJECTION, SOLUTION INTRAVENOUS at 10:01

## 2017-01-01 RX ADMIN — ONDANSETRON 8 MG: 2 INJECTION INTRAMUSCULAR; INTRAVENOUS at 04:01

## 2017-01-01 RX ADMIN — HEPARIN SODIUM AND DEXTROSE 21 UNITS/KG/HR: 10000; 5 INJECTION INTRAVENOUS at 03:01

## 2017-01-01 RX ADMIN — HEPARIN SODIUM AND DEXTROSE 21 UNITS/KG/HR: 10000; 5 INJECTION INTRAVENOUS at 08:01

## 2017-01-01 RX ADMIN — PROMETHAZINE HYDROCHLORIDE 12.5 MG: 25 INJECTION INTRAMUSCULAR; INTRAVENOUS at 10:01

## 2017-01-01 RX ADMIN — FUROSEMIDE 80 MG: 80 TABLET ORAL at 01:01

## 2017-01-01 RX ADMIN — HEPARIN SODIUM AND DEXTROSE 21 UNITS/KG/HR: 10000; 5 INJECTION INTRAVENOUS at 06:01

## 2017-01-01 RX ADMIN — AMIODARONE HYDROCHLORIDE 0.5 MG/MIN: 1.8 INJECTION, SOLUTION INTRAVENOUS at 03:01

## 2017-01-01 RX ADMIN — FUROSEMIDE 80 MG: 80 TABLET ORAL at 05:01

## 2017-01-01 RX ADMIN — Medication 10 MCG: at 01:01

## 2017-01-01 RX ADMIN — PROMETHAZINE HYDROCHLORIDE 12.5 MG: 25 INJECTION INTRAMUSCULAR; INTRAVENOUS at 05:01

## 2017-01-01 RX ADMIN — ALUMINUM HYDROXIDE, MAGNESIUM HYDROXIDE, AND DIMETHICONE 30 ML: 400; 400; 40 SUSPENSION ORAL at 12:01

## 2017-01-01 RX ADMIN — FUROSEMIDE 80 MG: 10 INJECTION, SOLUTION INTRAMUSCULAR; INTRAVENOUS at 01:01

## 2017-01-01 RX ADMIN — HEPARIN SODIUM AND DEXTROSE 21 UNITS/KG/HR: 10000; 5 INJECTION INTRAVENOUS at 10:01

## 2017-01-01 RX ADMIN — MAGNESIUM OXIDE TAB 400 MG (241.3 MG ELEMENTAL MG) 400 MG: 400 (241.3 MG) TAB at 10:01

## 2017-01-01 RX ADMIN — HEPARIN SODIUM AND DEXTROSE 17 UNITS/KG/HR: 10000; 5 INJECTION INTRAVENOUS at 11:01

## 2017-01-01 RX ADMIN — HEPATITIS B VACCINE (RECOMBINANT) 20 MCG: 20 INJECTION, SUSPENSION INTRAMUSCULAR at 10:01

## 2017-01-01 RX ADMIN — BENZONATATE 100 MG: 100 CAPSULE ORAL at 07:01

## 2017-01-01 RX ADMIN — HEPARIN SODIUM AND DEXTROSE 28 UNITS/KG/HR: 10000; 5 INJECTION INTRAVENOUS at 07:01

## 2017-01-01 RX ADMIN — CALCIUM CARBONATE (ANTACID) CHEW TAB 500 MG 500 MG: 500 CHEW TAB at 02:01

## 2017-01-01 RX ADMIN — DIPHENHYDRAMINE HYDROCHLORIDE 25 MG: 25 CAPSULE ORAL at 09:01

## 2017-01-01 RX ADMIN — GLYCOPYRROLATE 0.1 MG: 0.2 INJECTION INTRAMUSCULAR; INTRAVENOUS at 07:01

## 2017-01-01 RX ADMIN — VASOPRESSIN 0.04 UNITS/MIN: 20 INJECTION INTRAVENOUS at 09:01

## 2017-01-01 RX ADMIN — ONDANSETRON 8 MG: 2 INJECTION INTRAMUSCULAR; INTRAVENOUS at 03:01

## 2017-01-01 RX ADMIN — EPINEPHRINE 0.04 MCG/KG/MIN: 1 INJECTION PARENTERAL at 10:01

## 2017-01-01 RX ADMIN — GLYCOPYRROLATE 0.1 MG: 0.2 INJECTION INTRAMUSCULAR; INTRAVENOUS at 02:01

## 2017-01-01 RX ADMIN — MAGNESIUM SULFATE IN WATER 2 G: 40 INJECTION, SOLUTION INTRAVENOUS at 08:01

## 2017-01-01 RX ADMIN — CHLOROTHIAZIDE SODIUM 250 MG: 500 INJECTION, POWDER, LYOPHILIZED, FOR SOLUTION INTRAVENOUS at 09:01

## 2017-01-01 RX ADMIN — FUROSEMIDE 20 MG: 20 TABLET ORAL at 10:01

## 2017-01-01 RX ADMIN — EPINEPHRINE 0.04 MCG/KG/MIN: 1 INJECTION PARENTERAL at 12:01

## 2017-01-01 RX ADMIN — MAGNESIUM SULFATE 1 G: 500 INJECTION, SOLUTION INTRAMUSCULAR; INTRAVENOUS at 11:01

## 2017-01-01 RX ADMIN — FUROSEMIDE 80 MG: 10 INJECTION, SOLUTION INTRAMUSCULAR; INTRAVENOUS at 10:01

## 2017-01-01 RX ADMIN — ONDANSETRON 8 MG: 2 INJECTION INTRAMUSCULAR; INTRAVENOUS at 01:01

## 2017-01-01 RX ADMIN — CEFEPIME HYDROCHLORIDE 1 G: 1 INJECTION, POWDER, FOR SOLUTION INTRAMUSCULAR; INTRAVENOUS at 08:01

## 2017-01-01 RX ADMIN — AMIODARONE HYDROCHLORIDE 0.5 MG/MIN: 1.8 INJECTION, SOLUTION INTRAVENOUS at 10:01

## 2017-01-01 RX ADMIN — LIDOCAINE HYDROCHLORIDE 5 ML: 10 INJECTION INFILTRATION; PERINEURAL at 09:01

## 2017-01-01 RX ADMIN — DOBUTAMINE IN DEXTROSE 5 MCG/KG/MIN: 200 INJECTION, SOLUTION INTRAVENOUS at 11:01

## 2017-01-01 RX ADMIN — POTASSIUM CHLORIDE 40 MEQ: 400 INJECTION, SOLUTION INTRAVENOUS at 01:01

## 2017-01-01 RX ADMIN — BENZONATATE 100 MG: 100 CAPSULE ORAL at 02:01

## 2017-01-01 RX ADMIN — HEPARIN SODIUM AND DEXTROSE 17 UNITS/KG/HR: 10000; 5 INJECTION INTRAVENOUS at 06:01

## 2017-01-01 RX ADMIN — POTASSIUM CHLORIDE 40 MEQ: 20 SOLUTION ORAL at 05:01

## 2017-01-01 RX ADMIN — PROMETHAZINE HYDROCHLORIDE 12.5 MG: 25 INJECTION INTRAMUSCULAR; INTRAVENOUS at 06:01

## 2017-01-01 RX ADMIN — FUROSEMIDE 80 MG: 10 INJECTION, SOLUTION INTRAMUSCULAR; INTRAVENOUS at 05:01

## 2017-01-01 RX ADMIN — FAMOTIDINE 20 MG: 20 TABLET, FILM COATED ORAL at 10:01

## 2017-01-01 RX ADMIN — Medication 10 ML: at 01:01

## 2017-01-01 RX ADMIN — DIPHENHYDRAMINE HYDROCHLORIDE 25 MG: 25 CAPSULE ORAL at 12:01

## 2017-01-01 RX ADMIN — WARFARIN SODIUM 5 MG: 5 TABLET ORAL at 04:01

## 2017-01-01 RX ADMIN — VASOPRESSIN 0.04 UNITS/MIN: 20 INJECTION INTRAVENOUS at 08:01

## 2017-01-01 RX ADMIN — MIDAZOLAM HYDROCHLORIDE 1 MG: 1 INJECTION INTRAMUSCULAR; INTRAVENOUS at 05:01

## 2017-01-01 RX ADMIN — MAGNESIUM SULFATE IN WATER 2 G: 40 INJECTION, SOLUTION INTRAVENOUS at 06:01

## 2017-01-01 RX ADMIN — STANDARDIZED SENNA CONCENTRATE AND DOCUSATE SODIUM 1 TABLET: 8.6; 5 TABLET, FILM COATED ORAL at 10:01

## 2017-01-01 RX ADMIN — PIPERACILLIN AND TAZOBACTAM 4.5 G: 4; .5 INJECTION, POWDER, LYOPHILIZED, FOR SOLUTION INTRAVENOUS; PARENTERAL at 10:01

## 2017-01-01 RX ADMIN — CLOSTRIDIUM TETANI TOXOID ANTIGEN (FORMALDEHYDE INACTIVATED), CORYNEBACTERIUM DIPHTHERIAE TOXOID ANTIGEN (FORMALDEHYDE INACTIVATED), BORDETELLA PERTUSSIS TOXOID ANTIGEN (GLUTARALDEHYDE INACTIVATED), BORDETELLA PERTUSSIS FILAMENTOUS HEMAGGLUTININ ANTIGEN (FORMALDEHYDE INACTIVATED), BORDETELLA PERTUSSIS PERTACTIN ANTIGEN, AND BORDETELLA PERTUSSIS FIMBRIAE 2/3 ANTIGEN 0.5 ML: 5; 2; 2.5; 5; 3; 5 INJECTION, SUSPENSION INTRAMUSCULAR at 10:01

## 2017-01-01 RX ADMIN — STANDARDIZED SENNA CONCENTRATE AND DOCUSATE SODIUM 1 TABLET: 8.6; 5 TABLET, FILM COATED ORAL at 09:01

## 2017-01-01 RX ADMIN — ONDANSETRON 8 MG: 2 INJECTION INTRAMUSCULAR; INTRAVENOUS at 02:01

## 2017-01-01 RX ADMIN — CETIRIZINE HYDROCHLORIDE 5 MG: 5 TABLET, FILM COATED ORAL at 09:01

## 2017-01-01 RX ADMIN — HEPARIN SODIUM AND DEXTROSE 26 UNITS/KG/HR: 10000; 5 INJECTION INTRAVENOUS at 05:01

## 2017-01-01 RX ADMIN — FUROSEMIDE 20 MG: 20 TABLET ORAL at 11:01

## 2017-01-01 RX ADMIN — CEFEPIME HYDROCHLORIDE 1 G: 1 INJECTION, POWDER, FOR SOLUTION INTRAMUSCULAR; INTRAVENOUS at 07:01

## 2017-01-01 RX ADMIN — PROMETHAZINE HYDROCHLORIDE 12.5 MG: 25 INJECTION INTRAMUSCULAR; INTRAVENOUS at 02:01

## 2017-01-01 RX ADMIN — LIDOCAINE HYDROCHLORIDE 5 ML: 10 INJECTION, SOLUTION INFILTRATION; PERINEURAL at 09:01

## 2017-01-01 RX ADMIN — ONDANSETRON 8 MG: 2 INJECTION INTRAMUSCULAR; INTRAVENOUS at 07:01

## 2017-01-01 RX ADMIN — HEPARIN SODIUM AND DEXTROSE 17 UNITS/KG/HR: 10000; 5 INJECTION INTRAVENOUS at 01:01

## 2017-01-01 RX ADMIN — LORAZEPAM 0.5 MG: 2 INJECTION INTRAMUSCULAR; INTRAVENOUS at 03:01

## 2017-01-01 RX ADMIN — HEPARIN SODIUM AND DEXTROSE 24 UNITS/KG/HR: 10000; 5 INJECTION INTRAVENOUS at 02:01

## 2017-01-01 RX ADMIN — HEPARIN SODIUM AND DEXTROSE 13 UNITS/KG/HR: 10000; 5 INJECTION INTRAVENOUS at 02:01

## 2017-01-01 RX ADMIN — HEPARIN SODIUM AND DEXTROSE 17 UNITS/KG/HR: 10000; 5 INJECTION INTRAVENOUS at 02:01

## 2017-01-01 RX ADMIN — CHLOROTHIAZIDE SODIUM 500 MG: 500 INJECTION, POWDER, LYOPHILIZED, FOR SOLUTION INTRAVENOUS at 08:01

## 2017-01-01 RX ADMIN — CALCIUM CARBONATE (ANTACID) CHEW TAB 500 MG 500 MG: 500 CHEW TAB at 12:01

## 2017-01-01 RX ADMIN — FAMOTIDINE 20 MG: 20 TABLET, FILM COATED ORAL at 11:01

## 2017-01-01 RX ADMIN — EPINEPHRINE 0.06 MCG/KG/MIN: 1 INJECTION PARENTERAL at 12:01

## 2017-01-01 RX ADMIN — ACETAMINOPHEN 650 MG: 325 TABLET ORAL at 10:01

## 2017-01-01 RX ADMIN — SODIUM CHLORIDE 15 MG/HR: 900 INJECTION, SOLUTION INTRAVENOUS at 08:01

## 2017-01-01 RX ADMIN — SODIUM CHLORIDE 250 ML: 0.9 INJECTION, SOLUTION INTRAVENOUS at 03:01

## 2017-01-01 RX ADMIN — FUROSEMIDE 60 MG: 40 TABLET ORAL at 08:01

## 2017-01-01 RX ADMIN — AMIODARONE HYDROCHLORIDE 200 MG: 200 TABLET ORAL at 08:01

## 2017-01-01 RX ADMIN — PROPOFOL 10 MCG/KG/MIN: 10 INJECTION, EMULSION INTRAVENOUS at 05:01

## 2017-01-01 RX ADMIN — IVERMECTIN 21000 MCG: 3 TABLET ORAL at 12:01

## 2017-01-01 RX ADMIN — PROPOFOL 25 MCG/KG/MIN: 10 INJECTION, EMULSION INTRAVENOUS at 04:01

## 2017-01-01 RX ADMIN — DOBUTAMINE IN DEXTROSE 2.5 MCG/KG/MIN: 200 INJECTION, SOLUTION INTRAVENOUS at 09:01

## 2017-01-01 RX ADMIN — Medication 2500 MCG: at 06:01

## 2017-01-01 RX ADMIN — FUROSEMIDE 20 MG: 10 INJECTION, SOLUTION INTRAMUSCULAR; INTRAVENOUS at 12:01

## 2017-01-01 RX ADMIN — HEPARIN SODIUM AND DEXTROSE 24 UNITS/KG/HR: 10000; 5 INJECTION INTRAVENOUS at 01:01

## 2017-01-01 RX ADMIN — CALCIUM CARBONATE (ANTACID) CHEW TAB 500 MG 500 MG: 500 CHEW TAB at 11:01

## 2017-01-01 RX ADMIN — LISINOPRIL 2.5 MG: 2.5 TABLET ORAL at 10:01

## 2017-01-01 RX ADMIN — AMIODARONE HYDROCHLORIDE 0.5 MG/MIN: 1.8 INJECTION, SOLUTION INTRAVENOUS at 01:01

## 2017-01-01 RX ADMIN — PROMETHAZINE HYDROCHLORIDE 12.5 MG: 25 INJECTION INTRAMUSCULAR; INTRAVENOUS at 03:01

## 2017-01-01 RX ADMIN — CEFEPIME HYDROCHLORIDE 1 G: 1 INJECTION, POWDER, FOR SOLUTION INTRAMUSCULAR; INTRAVENOUS at 03:01

## 2017-01-01 RX ADMIN — TRAZODONE HYDROCHLORIDE 50 MG: 50 TABLET ORAL at 10:01

## 2017-01-01 RX ADMIN — FUROSEMIDE 60 MG: 40 TABLET ORAL at 06:01

## 2017-01-01 RX ADMIN — CALCIUM CARBONATE (ANTACID) CHEW TAB 500 MG 500 MG: 500 CHEW TAB at 01:01

## 2017-01-01 RX ADMIN — BENZONATATE 100 MG: 100 CAPSULE ORAL at 03:01

## 2017-01-01 RX ADMIN — MIDAZOLAM HYDROCHLORIDE 1 MG: 1 INJECTION, SOLUTION INTRAMUSCULAR; INTRAVENOUS at 05:01

## 2017-01-01 RX ADMIN — DOBUTAMINE IN DEXTROSE 5 MCG/KG/MIN: 200 INJECTION, SOLUTION INTRAVENOUS at 06:01

## 2017-01-01 RX ADMIN — MAGNESIUM SULFATE IN WATER 2 G: 40 INJECTION, SOLUTION INTRAVENOUS at 05:01

## 2017-01-01 RX ADMIN — ONDANSETRON 8 MG: 2 INJECTION INTRAMUSCULAR; INTRAVENOUS at 09:01

## 2017-01-01 RX ADMIN — HEPARIN SODIUM AND DEXTROSE 23 UNITS/KG/HR: 10000; 5 INJECTION INTRAVENOUS at 09:01

## 2017-01-01 RX ADMIN — FUROSEMIDE 80 MG: 80 TABLET ORAL at 09:01

## 2017-01-01 RX ADMIN — HEPARIN SODIUM AND DEXTROSE 19 UNITS/KG/HR: 10000; 5 INJECTION INTRAVENOUS at 05:01

## 2017-01-01 RX ADMIN — HEPARIN SODIUM AND DEXTROSE 28 UNITS/KG/HR: 10000; 5 INJECTION INTRAVENOUS at 06:01

## 2017-01-01 RX ADMIN — MINERAL OIL AND WHITE PETROLATUM: 150; 830 OINTMENT OPHTHALMIC at 03:01

## 2017-01-01 RX ADMIN — PANTOPRAZOLE SODIUM 40 MG: 40 TABLET, DELAYED RELEASE ORAL at 09:01

## 2017-01-01 RX ADMIN — DOBUTAMINE IN DEXTROSE 5 MCG/KG/MIN: 200 INJECTION, SOLUTION INTRAVENOUS at 01:01

## 2017-01-01 RX ADMIN — FLUCONAZOLE 400 MG: 2 INJECTION INTRAVENOUS at 11:01

## 2017-01-01 RX ADMIN — AMIODARONE HYDROCHLORIDE 150 MG: 1.5 INJECTION, SOLUTION INTRAVENOUS at 05:01

## 2017-01-01 RX ADMIN — ONDANSETRON 8 MG: 2 INJECTION INTRAMUSCULAR; INTRAVENOUS at 05:01

## 2017-01-01 RX ADMIN — MAGNESIUM SULFATE IN WATER 2 G: 40 INJECTION, SOLUTION INTRAVENOUS at 04:01

## 2017-01-01 RX ADMIN — Medication 1000 MG: at 05:01

## 2017-01-01 RX ADMIN — Medication 500 MG: at 06:01

## 2017-01-01 RX ADMIN — EPINEPHRINE 0.06 MCG/KG/MIN: 1 INJECTION PARENTERAL at 10:01

## 2017-01-04 PROBLEM — I42.8 NICM (NONISCHEMIC CARDIOMYOPATHY): Status: ACTIVE | Noted: 2017-01-01

## 2017-01-04 PROBLEM — I50.9 ACUTE DECOMPENSATED HEART FAILURE: Status: ACTIVE | Noted: 2017-01-01

## 2017-01-05 PROBLEM — I42.8 NICM (NONISCHEMIC CARDIOMYOPATHY): Status: ACTIVE | Noted: 2017-01-01

## 2017-01-05 NOTE — PROCEDURES
Central Line Placement Procedure Note:   Number of lumens: 3  Indication: CVP and SVo2  Post procedure diagnosis: same   Estimated blood loss: 5ml   Consent:   Prior to starting the procedure, informed consent was obtained with risks outlined including, but not limited to: pneumothorax, hemothorax, infection, bleeding, and thrombosis.   Prior to starting, all those in the room washed their hands and donned caps and masks, which were worn throughout the procedure. A time-out was performed. The insertion site and surrounding areas were generously scrubbed with chlorhexadine. Using sterile technique, the central line kit was opened and a whole body sterile drape was applied to the patient. The insertion site was anesthetized with lidocaine 1% without epinephrine. The Right Internal Jugular Vein was cannulated and dark red, non-pulsatile blood was aspirated into the syringe. Then, using sterile Seldinger technique, a J-type guidewire was advanced through the needle into the vein without difficulty or resistance. The needle was removed and a small incision was made at the insertion site to allow for advancement of a dilator. After the track was successfully dilated, the catheter was advanced over the guidewire to the skin and the guidewire was removed intact. Dark red blood was aspirated from each of the lumens and sterile saline was injected into each of the ports. The catheter was sewn into place and the site was again cleaned with chlorhexadine. A biopatch was applied to the insertion site and a sterile central line dressing was applied. A chest XRAY was done to confirm catheter placement and absense of pneumothorax/hemothorax. The patient tolerated the procedure well.     Signed:    Louis Jenkins MD  Cardiology Fellow, PGY-5  Pager: [phone]  1/5/2017 12:05 AM

## 2017-01-05 NOTE — PROGRESS NOTES
Progress Note  Heart Transplant Service    Admit Date: 1/4/2017   LOS: 1 day     SUBJECTIVE:     Follow up for: Acute decompensated heart failure    Interval History: Net negative 1.7 L over last 24 hours with one dose of IVP. Patient without complaints this morning.     Scheduled Meds:   ondansetron        pantoprazole  40 mg Oral Daily    sodium chloride 0.9%  3 mL Intravenous Q8H     Continuous Infusions:   DOBUTamine 2.5 mcg/kg/min (01/05/17 1500)    heparin (porcine) in D5W Stopped (01/05/17 1400)     PRN Meds:calcium carbonate, heparin (PORCINE), heparin (PORCINE), ondansetron    Review of patient's allergies indicates:  No Known Allergies    OBJECTIVE:     Vital Signs (Most Recent)  Temp: 97.4 °F (36.3 °C) (01/05/17 1500)  Pulse: 101 (01/05/17 1500)  Resp: 17 (01/05/17 1500)  BP: 111/60 (01/05/17 1500)  SpO2: 98 % (01/05/17 1500)    Vital Signs Range (Last 24H):  Temp:  [97 °F (36.1 °C)-97.9 °F (36.6 °C)]   Pulse:  []   Resp:  [15-39]   BP: ()/(57-68)   SpO2:  [92 %-99 %]     I & O (Last 24H):  Intake/Output Summary (Last 24 hours) at 01/05/17 1532  Last data filed at 01/05/17 1500   Gross per 24 hour   Intake          1563.38 ml   Output             3350 ml   Net         -1786.62 ml            Telemetry: NSR with frequent PVCs    Physical Exam   Constitutional: She is oriented to person, place, and time. She appears well-developed and well-nourished.   HENT:   Head: Normocephalic and atraumatic.   Eyes: EOM are normal. Pupils are equal, round, and reactive to light.   Neck: Normal range of motion. Neck supple. JVD present.   Cardiovascular: Normal rate, regular rhythm, S1 normal and S2 normal.  Frequent extrasystoles are present. Exam reveals gallop and S3.    Lower extremities warm to touch bilateral, brisk capillary refill    Pulmonary/Chest: Effort normal and breath sounds normal. No respiratory distress. She has no wheezes. She has no rales.   Abdominal: Soft. Bowel sounds are normal.  She exhibits no distension. There is no tenderness.   Musculoskeletal: Normal range of motion. She exhibits edema (trace bilateral).   Neurological: She is alert and oriented to person, place, and time.   Skin: Skin is warm and dry.       Labs:       Recent Labs  Lab 01/05/17 0023 01/05/17 0432   WBC 17.69*  17.69* 17.94*   HGB 12.1  12.1 12.1   HCT 38.2  38.2 38.1     270 298   LYMPH 6.4*  6.4*  1.1  1.1 7.2*  1.3   MONO 4.4  4.4  0.8  0.8 3.2*  0.6   EOSINOPHIL 0.0  0.0 0.0         Recent Labs  Lab 01/05/17 0023 01/05/17 0432 01/05/17  1433   APTT 26.6  --  26.1   INR 2.0* 2.1* 1.7*        Recent Labs  Lab 01/05/17 0023 01/05/17 0432 01/05/17  1218   * 320* 374*   CALCIUM 8.4* 8.5* 8.5*   ALBUMIN 2.9* 3.0* 2.9*   PROT 6.6 6.7 6.7   * 140 139   K 4.2 3.5 4.1   CO2 23 27 25   CL 97 97 99   BUN 61* 60* 56*   CREATININE 2.1* 2.1* 1.8*   ALKPHOS 50* 54* 54*   ALT 36 38 35   AST 37 31 24   BILITOT 1.3* 1.4* 1.0   MG 2.4 1.9 2.4   PHOS 4.6* 4.0 2.9     Estimated Creatinine Clearance: 44.7 mL/min (based on Cr of 1.8).      Recent Labs  Lab 01/05/17 0023   BNP <10       No results for input(s): LDH in the last 168 hours.    Microbiology Results (last 7 days)     Procedure Component Value Units Date/Time    Blood culture [[id number]] Collected:  01/05/17 0035    Order Status:  Completed Specimen:  Blood from Line, Jugular, Internal Right Updated:  01/05/17 0915     Blood Culture, Routine No Growth to date    Blood culture [[id number]] Collected:  01/05/17 0054    Order Status:  Completed Specimen:  Blood from Peripheral, Wrist, Left Updated:  01/05/17 0915     Blood Culture, Routine No Growth to date        2D Echo 1/5/2017      1 - Severely depressed left ventricular systolic function (EF 15-20%). LVEDD 5.4 cm    2 - Normal left ventricular diastolic function.     3 - Right ventricular enlargement with moderately depressed systolic function.     4 - Biatrial enlargement.     5 -  Pulmonary hypertension. The estimated PA systolic pressure is 45 mmHg.     6 - Moderate mitral regurgitation.     7 - Moderate tricuspid regurgitation.     8 - Increased central venous pressure.     9 - There is thrombus in the cardiac apex.     ASSESSMENT:     60 y/o with no PMHx, recently diagnosed with heart failure, [facility] with no evidence of CAD, being transferred from [facility] in [place] for advanced heart failure consideration.    PLAN:     Acute Decompensated Heart Failure, New Onset  -EF 15-20%, LVEDD 5.4 cm, NYHA IV symptoms  -SVO2 49% on admit, started on  2.5 mcg/kg/min now with SVO2 70% this am  -CVP 12-14 overnight, will re-dose lasix this afternoon once repeat CVP obtained  -Will attempt to initiate GDMT in an effort to wean   -2 g Na dietary restriction, 1500 mL fluid restriction, strict I/Os    LV Apical Thrombus  -Was given coumadin at OSH  -INR rechecked this afternoon, now 1.7  -Resume heparin at 13 mg/kg/min, and re-check anti-Xa in 2 hours, then adjust per normogram  -Hold off on coumadin for now as she may have upcoming procedures    Leukocytosis  -Was given solumedrol at OSH  -BCx 1/5/17 NGTD, will f/u final results  -D/c Abx for now    Louise Monroy PA-C  [facility] [place] Spectralink #[id number]

## 2017-01-05 NOTE — PLAN OF CARE
Problem: Patient Care Overview  Goal: Plan of Care Review  Outcome: Ongoing (interventions implemented as appropriate)  Dobutamine and Heparin remains infusing. Receiving antibiotics. SVO2 70 this AM. CVP this shift 13,9. Lasix 80IVP given X 1 dose, good response, 2750 output. Sating well on RA, some SOB on exertion but tolerable. PVCs and ectopy noted via tele, some NVT noted after starting Dobutamine, team aware, patient asymptomatic, monitoring. Electrolyte replacement this shift. No acute events throughout day, VS and assessment per flow sheet, patient progressing towards goals as tolerated, plan of care reviewed with Vaishnavi Baca and family, all concerns addressed, will continue to monitor. Plan for 2d echo today and possible heart transplant workup.

## 2017-01-05 NOTE — PROGRESS NOTES
Pt had a 6 beat run of vtach on monitor. Pt is asymptomatic. Pt denies CP or SOB. RSOEANNE Philippe notified. Will administer K+ as ordered. Awaiting lab results. WCALVIN.

## 2017-01-05 NOTE — PROGRESS NOTES
Patient having a bath at this time. Placed red VAD education folder in room.  Talked with pt and nurses through the curtain about the folder and we will follow up tomorrow.

## 2017-01-05 NOTE — PROGRESS NOTES
Admit Note     Met with patient to assess needs. Patient is a 61 y.o.  female, admitted for for heart failure.      Patient admitted from [facility] on 1/4/2017 .  At this time, patient presents as alert and oriented x 4, pleasant and good eye contact.  At this time, patients caregiver is not in attendance.  Pt reports her spouse should be in house sometime today.     Household/Family Systems     Patient resides with patient's spouse, at     [address]  [place] [address]  Pt lives 6-7 hours from [facility]    Mailing address:   [address] [place] [address]      .Support system includes spouse and two adult children.  Patient does not have dependents that are need of being cared for.     Patients primary caregiver is Romeo Baca, patients spouse, phone number [phone].    Pt's cell: [phone]  Additional emergency contacts:  Venice Baca (son , lives in [place]) [phone]  Marge Rizvi (daughter, lives in [place]) [phone]    During admission, patient's caregiver plans to stay in patient's room.  Confirmed patient and patients caregivers do have access to reliable transportation.    Cognitive Status/Learning     Patient reports reading ability as 12th grade and states patient does not have difficulty with reading, writing, seeing, hearing, comprehension, learning and memory.  Patient reports patient learns best by one on one.   Needed: No.   Highest education level: High School (9-12) or GED    Vocation/Disability   .  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  Pt reports per her MD advice she retired in December 2016.  The pt's spouse is also retired.    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     Adherence     Patient reports a high level of adherence to patients health care regimen. Prior to admission the pt was on a  regular diet.  Adherence counseling and education provided. Patient verbalizes understanding.    Substance Use    Patient reports the following substance usage.    Tobacco: Pt reports she quit smoking a month ago.  Pt reports she smoked 1/2 ppd.  Alcohol: none, patient denies any use.  Illicit Drugs/Non-prescribed Medications: none, patient denies any use.  Patient states clear understanding of the potential impact of substance use.  Substance abstinence/cessation counseling, education and resources provided and reviewed.     Services Utilizing/ADLS    Infusion Service: Prior to admission, patient utilizing? no  Home Health: Prior to admission, patient utilizing? no  DME: Prior to admission, no  Pulmonary/Cardiac Rehab: Prior to admission, no  Dialysis:  Prior to admission, no  Transplant Specialty Pharmacy:  Prior to admission, no.    Prior to admission to transfer hospital  patient reports patient was independent with ADLS and was driving. Prior to this hospital stay the pt is not independent and requires ADL assistance.The pt's spouse does drive.   Patient reports patient is not able to care for self at this time due to compromised medical condition (as documented in medical record) and physical weakness..  Patient indicates a willingness to care for self once medically cleared to do so.    Insurance/Medications    Insured by   Payor/Plan Subscr  Sex Relation Sub. Ins. ID Effective Group Num   1. BLUE CROSS BL* TOMAS MORA 1955 Female  [id number]                                    [id number]      Primary Insurance (for UNOS reporting): Private Insurance  Secondary Insurance (for UNOS reporting): None    Patient reports patient is able to obtain and afford medications at this time and at time of discharge.    Living Will/Healthcare Power of     Patient states patient does not have a LW and/or HCPA.   provided education regarding LW and HCPA and the completion of  forms.    Coping/Mental Health    Patient is coping adequately with the aid of  family members. .  Patient indicates mental health difficulties. Pt reports anxiety attacks started happening at transfer hospital.  Pt reports no attacks for the last few days.  Pt reports taking ativan helps.  Worker provided emotional support.  Transplant  will follow as needed.     Discharge Planning    At time of discharge, patient plans to return to patient's home under the care of self and spouse.  Patients spouse will transport patient.  Per rounds today, expected discharge date has not been medically determined at this time. Patient and patients caregiver  verbalize understanding and are involved in treatment planning and discharge process.    Additional Concerns    Patient is being followed for needs, education, resources, information, emotional support, supportive counseling, and for supportive and skilled discharge plan of care.  providing ongoing psychosocial support, education, resources and d/c planning as needed.  SW remains available.  remains available. Patient verbalizes understanding and agreement with information reviewed, social work availability, and how to access available resources as needed.

## 2017-01-05 NOTE — PROGRESS NOTES
It is recorded that Ms. Baca is received in hospital to hospital transfer by physician order in consideration of higher level of care and consideration of candidacy of advanced cardiac therapy options.  She is received to the Coronary Care Intensive Care Unit.

## 2017-01-05 NOTE — H&P
Heart Transplant History & Physical  Attending Physician: Comfort Pereira MD  Chief Complaint: Heart Failure    HPI:   This is a 60 y/o woman with PMHx of NICM (15%) with no other known prior medical history who presents as a transfer from [facility] in [place] for higher level of care for ADHF in the setting of severe NICM.     She states that her issues first started in November when she had a bad virus that she never feels like she recovered from.     Per their records, she was first admitted on 12/11/16 when she presented to the hospital with SOB and was found to have NICM with EF of 15%. RHC and LHC were performed at that time that showed no evidence of CAD and elevated filling pressures. She was discharged with a Life Vest on 12/16. She returned on 12/18/16 with N/V and SOB and was treated for PNA with Levaquin. She then returned on 12/30/16 for a reported HR in the 30s for which she was evaluated for then discharged home from the ED. She most recently presented to [facility] on 1/2/17 for right sided chest pain at which time there was concern for PNA so she was started on CTX/Azithromycin and solumedrol. Her WBC was 17 at arrival, she was also still taking Levaquin from her prior admission.    Cardiology was consulted who stated that she was intolerant to ACE inhibitors in the past (hypotension) and started the patient on corlanor for tachycardia in the setting of BB therapy. V/Q scan was also ordered for concern of PE.    She endorses orthopnea and BURGESS. 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    ROS:    Constitution: Negative for fever or chills. Negative for weight loss or gain.   HENT: Negative for sore throat or headaches. Negative for rhinorrhea.  Eyes: Negative for blurred or double vision.   Cardiovascular: See above  Pulmonary: Negative for SOB. Negative for cough.   Gastrointestinal: Negative for abdominal pain. Negative for  nausea/ vomiting. Negative for diarrhea.   : Negative for dysuria.   Neurological: Negative for focal weakness or sensory changes.  PMH:     Past Medical History   Diagnosis Date    Apical mural thrombus 01/04/2017    NICM (nonischemic cardiomyopathy) 01/04/2017    NICM (nonischemic cardiomyopathy) 01/04/2017     History reviewed. No pertinent past surgical history.  Allergies:   Review of patient's allergies indicates:   NKDA    Medications:   Medication List from OSH   Azithromycin 500mg q24  Ceftriaxone 1gm q24  Lovenox 100 BID  Corlanor 5mg PO BID  Solumedrol 62.5mg IV TID  Protonix 40mg PO Daily  Spironolactone 12.5mg PO Daily      Social History:     Social History   Substance Use Topics    Smoking status: Not on file    Smokeless tobacco: Not on file    Alcohol use Not on file     Family History:   History reviewed. No pertinent family history.  Physical Exam:     Vitals:  Temp:  [97.6 °F (36.4 °C)]   Pulse:  [91-92]   Resp:  [19-24]   BP: ()/(58-61)   SpO2:  [93 %-95 %]   I/O's:  No intake or output data in the 24 hours ending 01/05/17 0042     Constitutional: NAD, conversant  HEENT: Sclera anicteric, PERRLA, EOMI  Neck: JVD to the jaw  CV: RRR, no m/r/g, normal S1/S2, occasional PVCs  Pulm: Minimal rales in the bases  GI: Abdomen soft, NTND, +BS  Extremities: Trace BLE edema, warm      Labs:   OSH Labs 1/4/17    CBC   20.8/12.1/39.0/261    BMP   138/4.2/101/24/49/2.2  Trop 0.04  Lactic 1.3  Tbil 1.6  AST/ALT 27/18  BNP 2291    No results for input(s): NA, K, CL, CO2, BUN, CREATININE, GLUCOSE, ANIONGAP in the last 168 hours.  No results for input(s): AST, ALT, ALKPHOS, BILITOT, BILIDIR, ALBUMIN in the last 168 hours.  No results for input(s): CALCIUM, MG, PHOS in the last 168 hours.  No results for input(s): TROPONINI, BNP in the last 168 hours.  No results for input(s): NITRITE, LEUKOCYTESUR, WBCUA, BACTERIA in the last 168 hours.    Invalid input(s): EPITHELIALCE, COGRCA    CrCl cannot be  calculated (Patient has no serum creatinine result on file.). No results for input(s): WBC, HGB, HCT, PLT, GRAN in the last 168 hours.  No results for input(s): PTT, INR in the last 168 hours.  No results for input(s): LACTATE in the last 168 hours.  No results found for: CHOL, HDL, LDLCALC, TRIG  No results found for: HGBA1C  No results found for: TSH, L6CWSGB         Imaging:   CXR: 17     No results found for: EF    EK17  Sinus rhythm, 1st degree AVB, Left axis, iLBBB, PVCs    OSH Echocardiogram 1/3/17  EF < 15%, dilated, global  Moderate apical thrombus  Mild TR    OSH CXR 17  Right costophrenic angle opacity    LHC 12/15/16  Left Main 10-20%  Otherwise non obstructive    RHC 12/15/16  Wedge 35  PA 55/36, mean 42  RV 55/22  RA 18  Index 1.84    CTA Chest 16  No PE, mild pulmonary edema    CVP 13  SVo2 49    Assessment and Plan:   #ADHF - combined, NICM. Concern for viral etiology- Patient with evidence of significant fluid overload and cardiorenal JEREMY. Warm and wet exacerbation  - Basic labs  - EKG/CXR  - Echocardiogram with Optison for thrombus  - Will place central line for CVP and SV02  - Dobutamine 2.5, monitor closely for ectopy  - Lasix 80mg IVP x 1, monitor response, recheck SVo2 and CVP in AM and determine future diuresis at that time  - DC steroids as below  - Hold spironolactone  - Hold corlanor at this time  - Monitor BP and start afterload reduction in AM if patient's BP can tolerate    #Apical Thrombus - Noted on OSH echo  - Formal Echo with optison in AM  - DC Lovenox considering renal function, start heparin GTT   - PT/INR daily  - Will await starting coumadin until procedures during this admission are ruled out    #Concern for PNA  - Procalcitonin  - Blood cultures x 2  - Continue CTX/Azithromycin for a 7 day course (end date 17 as it was started at the OSH)  - DC steroids as this will contribute to her fluid overload state    Patient discussed with attending physician,  Dr. Pereira.      Signed:    Louis Jenkins MD  Cardiology Fellow, PGY-5  Pager: [phone]  1/5/2017 10:05 PM

## 2017-01-06 NOTE — PLAN OF CARE
Problem: Physical Therapy Goal  Goal: Physical Therapy Goal  Goals to be met by: 2017     Patient will increase functional independence with mobility by performin. Supine to sit with Modified [place]  2. Sit to supine with Modified [place]  3. Sit to stand transfer with Modified [place]  4. Bed to chair transfer with Modified [place]   5. Gait x 200 feet with Supervision   6. Ascend/descend 5 stairs with no Handrails Stand-by Assistance   Outcome: Ongoing (interventions implemented as appropriate)  PT evaluation complete. Anticipate pt will be safe to return home with family assist at d/c.

## 2017-01-06 NOTE — PLAN OF CARE
Problem: Patient Care Overview  Goal: Plan of Care Review  Outcome: Ongoing (interventions implemented as appropriate)  POC reviewed with pt. VSS and no changes over night. Wick catheter in place. Pt still receiving dobutamine and heparin ggt. Will continue to monitor.

## 2017-01-06 NOTE — PT/OT/SLP EVAL
"Physical Therapy  Evaluation    Vaishnavi Baca   MRN: [id number]   Admitting Diagnosis: Acute decompensated heart failure    PT Received On: 17  PT Start Time: 1403     PT Stop Time: 1423    PT Total Time (min): 20 min       Billable Minutes:  Evaluation 12 and Therapeutic Activity 8    Diagnosis: Acute decompensated heart failure    Past Medical History   Diagnosis Date    Apical mural thrombus 2017    NICM (nonischemic cardiomyopathy) 2017    NICM (nonischemic cardiomyopathy) 2017      History reviewed. No pertinent past surgical history.    Referring physician: Comfort Pereira MD  Date referred to PT: 2017    General Precautions: Standard, fall     Do you have any cultural, spiritual, Temple conflicts, given your current situation?: None    Patient History:  Lives With: spouse  Living Arrangements: house  Home Accessibility: stairs to enter home  Number of Stairs to Enter Home: 5  Stair Railings at Home: none  Transportation Available: family or friend will provide, car  Living Environment Comment: Pt lives with her  in a [facility] with [address]. She works full time as an assistant warden and was independent with all mobility. Pt reports she may plan to retire given new diagnosis.  Equipment Currently Used at Home: none     Previous Level of Function:  Ambulation Skills: independent  Transfer Skills: independent  ADL Skills: independent  Work/Leisure Activity: independent    Subjective:  Communicated with RN prior to session.  Pt reported "It makes you weak when you haven't been out of bed for a few days."  Chief Complaint: Fatigue  Patient goals: To go home    Pain Ratin/10     Objective:   Patient found with: blood pressure cuff, central line, telemetry, pulse ox (continuous), peripheral IV, oxygen (female external catheter)     Cognitive Exam:  Oriented to: Person, Place, Time and Situation    Follows Commands/attention: Follows multistep  commands  Communication: " clear/fluent  Safety awareness/insight to disability: intact    Physical Exam:  Postural examination/scapula alignment: Rounded shoulder and Head forward    Skin integrity: Visible skin intact  Edema: None noted     Sensation:   Intact    Upper Extremity Range of Motion:  Right Upper Extremity: WFL  Left Upper Extremity: WFL    Upper Extremity Strength:  Right Upper Extremity: WFL  Left Upper Extremity: WFL    Lower Extremity Range of Motion:  Right Lower Extremity: WFL  Left Lower Extremity: WFL    Lower Extremity Strength:  Right Lower Extremity: WFL  Left Lower Extremity: WFL    Gross motor coordination: WFL    Functional Mobility:  Bed Mobility:  Supine to Sit: Supervision    Transfers:  Sit <> Stand Assistance: Stand By Assistance  Sit <> Stand Assistive Device: No Assistive Device  Bed <> Chair Technique: Stand Pivot  Bed <> Chair Assistance: Stand By Assistance  Bed <> Chair Assistive Device: No Assistive Device  Toilet Transfer Technique: Stand Pivot  Toilet Transfer Assistance: Stand By Assistance  Toilet Transfer Assistive Device: No Assistive Device   Pt able to transfer bed -> commode -> bedside chair with stand by assist    Gait:   Gait Distance: 10 feet  Gait Assistive Device: No device  Gait Pattern: swing-to gait  Gait Deviation(s): decreased gurinder, decreased velocity of limb motion, decreased step length (forward flexed posture)    Stairs:  Not performed as pt requested to stay near commode to urinate from being on Lasix.     Balance:   Static Sit: GOOD: Takes MODERATE challenges from all directions  Dynamic Sit: GOOD: Maintains balance through MODERATE excursions of active trunk movement  Static Stand: FAIR+: Takes MINIMAL challenges from all directions  Dynamic stand: FAIR+: Needs CLOSE SUPERVISION during gait and is able to right self with minor LOB    Therapeutic Activities and Exercises:  Pt cued for safety with transfers and educated in appropriate cardiopulmonary response to activity.       AM-PAC 6 CLICK MOBILITY  How much help from another person does this patient currently need?   1 = Unable, Total/Dependent Assistance  2 = A lot, Maximum/Moderate Assistance  3 = A little, Minimum/Contact Guard/Supervision  4 = None, Modified [place]/Independent    Turning over in bed (including adjusting bedclothes, sheets and blankets)?: 3  Sitting down on and standing up from a chair with arms (e.g., wheelchair, bedside commode, etc.): 3  Moving from lying on back to sitting on the side of the bed?: 3  Moving to and from a bed to a chair (including a wheelchair)?: 3  Need to walk in hospital room?: 3  Climbing 3-5 steps with a railing?: 3  Total Score: 18     AM-PAC Raw Score CMS G-Code Modifier Level of Impairment Assistance   6 % Total / Unable   7 - 9 CM 80 - 100% Maximal Assist   10 - 14 CL 60 - 80% Moderate Assist   15 - 19 CK 40 - 60% Moderate Assist   20 - 22 CJ 20 - 40% Minimal Assist   23 CI 1-20% SBA / CGA   24 CH 0% Independent/ Mod I     Patient left up in chair with all lines intact and call button in reach.    Assessment:   Vaishnavi Baca is a 61 y.o. female with a medical diagnosis of Acute decompensated heart failure and presents with decreased endurance and limited independence with mobility. Anticipate that she will progress well with continued mobility and education re: return to activity. Recommend home with family assist PRN at discharge.    Rehab identified problem list/impairments: Rehab identified problem list/impairments: impaired endurance, impaired functional mobilty, impaired cardiopulmonary response to activity    Rehab potential is good.    Activity tolerance: Fair    Discharge recommendations: Discharge Facility/Level Of Care Needs: home     Barriers to discharge: Barriers to Discharge: Inaccessible home environment    Equipment recommendations: Equipment Needed After Discharge: none     GOALS:   Physical Therapy Goals        Problem: Physical Therapy Goal    Goal  Priority Disciplines Outcome Goal Variances Interventions   Physical Therapy Goal     PT/OT, PT Ongoing (interventions implemented as appropriate)     Description:  Goals to be met by: 2017     Patient will increase functional independence with mobility by performin. Supine to sit with Modified [place]  2. Sit to supine with Modified [place]  3. Sit to stand transfer with Modified [place]  4. Bed to chair transfer with Modified [place]   5. Gait  x 200 feet with Supervision   6. Ascend/descend 5 stairs with no Handrails Stand-by Assistance                 PLAN:    Patient to be seen 3 x/week to address the above listed problems via gait training, therapeutic activities, therapeutic exercises  Plan of Care expires: 17  Plan of Care reviewed with: patient    Sophia LeJeune, PT, DPT  [phone]

## 2017-01-06 NOTE — PROGRESS NOTES
Progress Note  Heart Transplant Service    Admit Date: 1/4/2017   LOS: 2 days     SUBJECTIVE:     Follow up for: Acute decompensated heart failure    Interval History: Patient feeling good this morning. Family coming to visit her today.    Scheduled Meds:   furosemide  80 mg Oral TID    lisinopril  2.5 mg Oral Daily    pantoprazole  40 mg Oral Daily    sodium chloride 0.9%  3 mL Intravenous Q8H     Continuous Infusions:   DOBUTamine 2.5 mcg/kg/min (01/06/17 1413)    heparin (porcine) in D5W 13 Units/kg/hr (01/06/17 1413)     PRN Meds:calcium carbonate, heparin (PORCINE), heparin (PORCINE), ondansetron    Review of patient's allergies indicates:  No Known Allergies    OBJECTIVE:     Vital Signs (Most Recent)  Temp: 98.6 °F (37 °C) (01/06/17 0700)  Pulse: 100 (01/06/17 1300)  Resp: 20 (01/06/17 1300)  BP: 112/71 (01/06/17 1300)  SpO2: 97 % (01/06/17 1300)    Vital Signs Range (Last 24H):  Temp:  [97.4 °F (36.3 °C)-98.6 °F (37 °C)]   Pulse:  []   Resp:  [13-34]   BP: ()/(52-77)   SpO2:  [97 %-100 %]     I & O (Last 24H):    Intake/Output Summary (Last 24 hours) at 01/06/17 1418  Last data filed at 01/06/17 1300   Gross per 24 hour   Intake          1029.53 ml   Output             4650 ml   Net         -3620.47 ml            Telemetry: NSR with occasional PVCs    Physical Exam   Constitutional: She is oriented to person, place, and time. She appears well-developed and well-nourished.   HENT:   Head: Normocephalic and atraumatic.   Eyes: EOM are normal. Pupils are equal, round, and reactive to light.   Neck: Normal range of motion. Neck supple. JVD present.   Cardiovascular: Normal rate, regular rhythm, S1 normal and S2 normal.  Frequent extrasystoles are present. Exam reveals gallop and S3.    Lower extremities warm to touch bilateral, brisk capillary refill    Pulmonary/Chest: Effort normal and breath sounds normal. No respiratory distress. She has no wheezes. She has no rales.   Abdominal: Soft.  Bowel sounds are normal. She exhibits no distension. There is no tenderness.   Musculoskeletal: Normal range of motion. She exhibits edema (trace bilateral).   Neurological: She is alert and oriented to person, place, and time.   Skin: Skin is warm and dry.       Labs:       Recent Labs  Lab 01/05/17  0023 01/05/17  0432 01/06/17  0306   WBC 17.69*  17.69* 17.94* 14.22*   HGB 12.1  12.1 12.1 11.9*   HCT 38.2  38.2 38.1 37.9     270 298 252   LYMPH 6.4*  6.4*  1.1  1.1 7.2*  1.3 13.2*  1.9   MONO 4.4  4.4  0.8  0.8 3.2*  0.6 6.4  0.9   EOSINOPHIL 0.0  0.0 0.0 0.0         Recent Labs  Lab 01/05/17  0023 01/05/17  0432 01/05/17  1433 01/06/17  0306   APTT 26.6  --  26.1  --    INR 2.0* 2.1* 1.7* 1.6*          Recent Labs  Lab 01/05/17  0432 01/05/17  1218 01/05/17  1738 01/06/17  0306 01/06/17  0836 01/06/17  1312   * 374* 388* 222*  --   --    CALCIUM 8.5* 8.5* 8.4* 8.8  --   --    ALBUMIN 3.0* 2.9*  --  3.1*  --   --    PROT 6.7 6.7  --  6.7  --   --     139 140 141  --   --    K 3.5 4.1 4.4 4.1 4.1 3.8   CO2 27 25 28 31*  --   --    CL 97 99 98 100  --   --    BUN 60* 56* 53* 45*  --   --    CREATININE 2.1* 1.8* 1.7* 1.4  --   --    ALKPHOS 54* 54*  --  56  --   --    ALT 38 35  --  31  --   --    AST 31 24  --  24  --   --    BILITOT 1.4* 1.0  --  1.0  --   --    MG 1.9 2.4 2.1 2.3 2.3 1.8   PHOS 4.0 2.9  --  2.8  --  2.6*     Estimated Creatinine Clearance: 57.4 mL/min (based on Cr of 1.4).      Recent Labs  Lab 01/05/17  0023   BNP <10       No results for input(s): LDH in the last 168 hours.    Microbiology Results (last 7 days)     Procedure Component Value Units Date/Time    Blood culture [[id number]] Collected:  01/05/17 0035    Order Status:  Completed Specimen:  Blood from Line, Jugular, Internal Right Updated:  01/06/17 0613     Blood Culture, Routine No Growth to date     Blood Culture, Routine No Growth to date    Blood culture [[id number]] Collected:  01/05/17 0054     Order Status:  Completed Specimen:  Blood from Peripheral, Wrist, Left Updated:  01/06/17 0613     Blood Culture, Routine No Growth to date     Blood Culture, Routine No Growth to date        2D Echo 1/5/2017      1 - Severely depressed left ventricular systolic function (EF 15-20%). LVEDD 5.4 cm    2 - Normal left ventricular diastolic function.     3 - Right ventricular enlargement with moderately depressed systolic function.     4 - Biatrial enlargement.     5 - Pulmonary hypertension. The estimated PA systolic pressure is 45 mmHg.     6 - Moderate mitral regurgitation.     7 - Moderate tricuspid regurgitation.     8 - Increased central venous pressure.     9 - There is thrombus in the cardiac apex.     ASSESSMENT:     62 y/o with no PMHx, recently diagnosed with heart failure, [facility] with no evidence of CAD, being transferred from [facility] in [place] for advanced heart failure consideration.    PLAN:     Acute Decompensated Heart Failure, New Onset  -EF 15-20%, LVEDD 5.4 cm, NYHA IV symptoms  -SVO2 73% today on  2.5 mcg/kg/min  -CVP 12-14 overnight, will re-dose lasix this afternoon once repeat CVP obtained  -Start lisinopril 2.5 mg today, titrate other GDMT as tolerated  -Will need to d/c with lifevest  -2 g Na dietary restriction, 1500 mL fluid restriction, strict I/Os    LV Apical Thrombus  -Continue heparin gtt, will transition to PO for outpatient when appropriate    Leukocytosis, trending down  -Was given solumedrol at OSH  -BCx 1/5/17 NGTD, will f/u final results  -D/c Abx for now    Louise Monroy PA-C  [facility]ink #[id number]

## 2017-01-06 NOTE — PLAN OF CARE
Problem: Patient Care Overview  Goal: Plan of Care Review  Outcome: Ongoing (interventions implemented as appropriate)  POC reviewed with pt. Pt verbalized understanding. Pt received lasix IVP x2. 600cc u/o today. Pt had 2 BM's with unmeasured U/O x2. Pt received PO lasix. Not responding. CVP increased from 9 to 13. Anti Xa supratherapeutic this am. Heparin readjusted, will titrate per nomogram. No other acute events. See flowsheets for full documentation.

## 2017-01-06 NOTE — NURSING TRANSFER
Nursing Transfer Note      1/6/2017     Transfer To: [address]    Transfer via wheelchair    Transfer with cardiac monitoring    Transported by RN    Chart send with patient: Yes    Notified: son    Upon arrival to floor: cardiac monitor applied, patient oriented to room, call bell in reach and bed in lowest position. RN at bedside to assess pt.

## 2017-01-06 NOTE — PROGRESS NOTES
Pt aaox3 while lying in bed with no family at bedside.  Introduced self to patient and purpose of visit.  Introduced pt to red LVAD folder and how education process flows.  Pt encouraged to read over information with her family this weekend and we will follow up with her next week.  Pt verbalizes understanding and in agreement of plan.

## 2017-01-07 NOTE — PROGRESS NOTES
Pt complained of some bleeding in urine. Notified Barak Wilkerson NP. No new orders given. Also asked if giving hte pt a bolus of heparin per nomogram was appropriate at this time. Ordered me to just up the dose of heparin. Will continue to moniotr.

## 2017-01-07 NOTE — PLAN OF CARE
Problem: Patient Care Overview  Goal: Plan of Care Review  Outcome: Ongoing (interventions implemented as appropriate)  Reviewed poc with pt verbalized understanding  vss  nad continue on dobutamine and heparin

## 2017-01-07 NOTE — PLAN OF CARE
Problem: Patient Care Overview  Goal: Plan of Care Review  Outcome: Ongoing (interventions implemented as appropriate)  Pt verbalizes no complaints. Denies CP, SOB. Pt complains of nausea and dry heaving. PT given 8 mg of Zofran and 12.5 mg phenergan.  and heparin drip infusing as ordered. Pt remains free of fall or injury. Pt verbalizes understanding of plan of care. Will continue to monitor.

## 2017-01-08 NOTE — PLAN OF CARE
Problem: Patient Care Overview  Goal: Plan of Care Review  Outcome: Ongoing (interventions implemented as appropriate)  Pt verbalizes no complaints. Denies CP, SOB, or other discomforts.  and heparin drip infusing as ordered. Pt remains free of fall or injury. Pt verbalizes understanding of plan of care. Will continue to monitor.

## 2017-01-08 NOTE — PROGRESS NOTES
Progress Note  Heart Transplant Service    Admit Date: 1/4/2017   LOS: 4 days     SUBJECTIVE:     Follow up for: Acute decompensated heart failure    Interval History: Patient feeling much better this morning. Slept well.     Scheduled Meds:   furosemide  40 mg Oral BID    lisinopril  5 mg Oral Daily    pantoprazole  40 mg Oral Daily    senna-docusate 8.6-50 mg  1 tablet Oral BID    sodium chloride 0.9%  3 mL Intravenous Q8H    spironolactone  12.5 mg Oral Daily    trazodone  50 mg Oral QHS     Continuous Infusions:   DOBUTamine 2.5 mcg/kg/min (01/08/17 0845)    heparin (porcine) in D5W 17 Units/kg/hr (01/08/17 0234)     PRN Meds:calcium carbonate, heparin (PORCINE), heparin (PORCINE), ondansetron, promethazine (PHENERGAN) IVPB    Review of patient's allergies indicates:  No Known Allergies    OBJECTIVE:     Vital Signs (Most Recent)  Temp: 97.9 °F (36.6 °C) (01/08/17 0750)  Pulse: (!) 113 (01/08/17 0750)  Resp: 18 (01/08/17 0750)  BP: (!) 102/56 (01/08/17 0750)  SpO2: 98 % (01/08/17 0750)    Vital Signs Range (Last 24H):  Temp:  [97 °F (36.1 °C)-98.3 °F (36.8 °C)]   Pulse:  []   Resp:  [18]   BP: ()/(56-64)   SpO2:  [98 %-99 %]     I & O (Last 24H):    Intake/Output Summary (Last 24 hours) at 01/08/17 0857  Last data filed at 01/08/17 0600   Gross per 24 hour   Intake          1579.58 ml   Output             2200 ml   Net          -620.42 ml        Telemetry: NSR with occasional PVCs    Physical Exam   Constitutional: She is oriented to person, place, and time. She appears well-developed and well-nourished.   HENT:   Head: Normocephalic and atraumatic.   Eyes: EOM are normal. Pupils are equal, round, and reactive to light.   Neck: Normal range of motion. Neck supple. JVD present.   Cardiovascular: Normal rate, regular rhythm, S1 normal and S2 normal.  Frequent extrasystoles are present. Exam reveals gallop and S3.    Lower extremities warm to touch bilateral, brisk capillary refill     Pulmonary/Chest: Effort normal and breath sounds normal. No respiratory distress. She has no wheezes. She has no rales.   Abdominal: Soft. Bowel sounds are normal. She exhibits no distension. There is no tenderness.   Musculoskeletal: Normal range of motion. She exhibits edema (trace bilateral).   Neurological: She is alert and oriented to person, place, and time.   Skin: Skin is warm and dry.       Labs:       Recent Labs  Lab 01/06/17  0306 01/07/17  0442 01/08/17  0450   WBC 14.22* 13.72* 13.18*   HGB 11.9* 14.0 14.3   HCT 37.9 44.2 45.5    280 263   LYMPH 13.2*  1.9 18.3  2.5 21.5  2.8   MONO 6.4  0.9 6.1  0.8 8.3  1.1*   EOSINOPHIL 0.0 0.5 1.2         Recent Labs  Lab 01/05/17  0023  01/05/17  1433 01/06/17  0306 01/07/17  0442 01/08/17  0450   APTT 26.6  --  26.1  --   --   --    INR 2.0*  < > 1.7* 1.6* 1.3* 1.2   < > = values in this interval not displayed.       Recent Labs  Lab 01/06/17  0306  01/06/17  1312 01/06/17  1753 01/07/17  0442 01/07/17  1713 01/08/17  0450   *  --   --  200* 155*  155*  --  152*  152*   CALCIUM 8.8  --   --  9.4 9.2  9.2  --  9.2  9.2   ALBUMIN 3.1*  --   --   --  3.4*  --  3.2*   PROT 6.7  --   --   --  7.5  --  7.1     --   --  142 144  144  --  138  138   K 4.1  < > 3.8 3.9 4.0  4.0  --  3.8  3.8   CO2 31*  --   --  32* 38*  38*  --  37*  37*     --   --  93* 94*  94*  --  91*  91*   BUN 45*  --   --  37* 34*  34*  --  28*  28*   CREATININE 1.4  --   --  1.3 1.3  1.3  --  1.2  1.2   ALKPHOS 56  --   --   --  61  --  57   ALT 31  --   --   --  40  --  37   AST 24  --   --   --  33  --  29   BILITOT 1.0  --   --   --  1.3*  --  1.4*   MG 2.3  < > 1.8 2.3 2.2 1.8 1.9   PHOS 2.8  --  2.6*  --  3.1  --  3.5   < > = values in this interval not displayed.  Estimated Creatinine Clearance: 63.3 mL/min (based on Cr of 1.2).      Recent Labs  Lab 01/08/17  0450   *       No results for input(s): LDH in the last 168  hours.    Microbiology Results (last 7 days)     Procedure Component Value Units Date/Time    Blood culture [[id number]] Collected:  01/05/17 0035    Order Status:  Completed Specimen:  Blood from Line, Jugular, Internal Right Updated:  01/08/17 0613     Blood Culture, Routine No Growth to date     Blood Culture, Routine No Growth to date     Blood Culture, Routine No Growth to date     Blood Culture, Routine No Growth to date    Blood culture [[id number]] Collected:  01/05/17 0054    Order Status:  Completed Specimen:  Blood from Peripheral, Wrist, Left Updated:  01/08/17 0613     Blood Culture, Routine No Growth to date     Blood Culture, Routine No Growth to date     Blood Culture, Routine No Growth to date     Blood Culture, Routine No Growth to date        2D Echo 1/5/2017      1 - Severely depressed left ventricular systolic function (EF 15-20%). LVEDD 5.4 cm    2 - Normal left ventricular diastolic function.     3 - Right ventricular enlargement with moderately depressed systolic function.     4 - Biatrial enlargement.     5 - Pulmonary hypertension. The estimated PA systolic pressure is 45 mmHg.     6 - Moderate mitral regurgitation.     7 - Moderate tricuspid regurgitation.     8 - Increased central venous pressure.     9 - There is thrombus in the cardiac apex.     ASSESSMENT:     62 y/o with no PMHx, recently diagnosed with heart failure, [facility] with no evidence of CAD, being transferred from [facility] in [place] for advanced heart failure consideration.    PLAN:     Acute Decompensated Heart Failure, New Onset  -EF 15-20%, LVEDD 5.4 cm, NYHA IV symptoms  -Continue  2.5 mcg/kg/min. May need to d/c with lifevest  -Increased lisinopril to 5 mg. Will increase Aldactone to 25mg daily.   -2 g Na dietary restriction, 1500 mL fluid restriction, strict I/Os    LV Apical Thrombus  -Continue heparin gtt, will transition to PO for outpatient when appropriate    Leukocytosis, trending down  -Was given  solumedrol at OSH  -BCx 1/5/17 NGTD, will f/u final results  -D/cd Abx.

## 2017-01-09 NOTE — PROGRESS NOTES
BP low.  Pt continues to experience nausea, but denies any pain, dizziness, lightheadedness, drowsiness, or blurry vision.  Dr. Jenkins notified; no new orders at this time.  Will continue to monitor.        01/09/17 1145 01/09/17 1146   Vital Signs   Pulse (!) 115 --    Heart Rate Source Monitor --    Resp 18 --    SpO2 95 % --    O2 Device (Oxygen Therapy) room air --    BP (!) 86/48 (!) 88/48   MAP (mmHg) 61 --    BP Location Left arm Left arm   BP Method Automatic Manual   Patient Position Lying Lying

## 2017-01-09 NOTE — PROGRESS NOTES
Heart Transplant Progress Note  Attending Physician: Comfort Pereira MD  Hospital Day: 6    Subjective:   Interval History: Overnight, patient continues to diurese    Medications:   Continuous Infusions:   DOBUTamine 2.5 mcg/kg/min (01/08/17 0845)    heparin (porcine) in D5W 19 Units/kg/hr (01/09/17 0635)       Scheduled Meds:   furosemide  40 mg Oral BID    lisinopril  5 mg Oral Daily    pantoprazole  40 mg Oral Daily    senna-docusate 8.6-50 mg  1 tablet Oral BID    sodium chloride 0.9%  3 mL Intravenous Q8H    spironolactone  25 mg Oral Daily    trazodone  50 mg Oral QHS     PRN Meds:calcium carbonate, heparin (PORCINE), heparin (PORCINE), ondansetron, promethazine (PHENERGAN) IVPB  Objective:     Vitals:  Temp:  [97.9 °F (36.6 °C)-98.9 °F (37.2 °C)]   Pulse:  []   Resp:  [18]   BP: ()/(52-60)   SpO2:  [97 %-98 %]    I/O's:    Intake/Output Summary (Last 24 hours) at 01/09/17 0745  Last data filed at 01/09/17 0635   Gross per 24 hour   Intake           1224.8 ml   Output              250 ml   Net            974.8 ml      Constitutional: She is oriented to person, place, and time. She appears well-developed and well-nourished.   HENT:   Head: Normocephalic and atraumatic.   Eyes: EOM are normal. Pupils are equal, round, and reactive to light.   Neck: Normal range of motion. Neck supple. JVD present.   Cardiovascular: Normal rate, regular rhythm, S1 normal and S2 normal. Frequent extrasystoles are present. Exam reveals gallop and S3.   Lower extremities warm to touch bilateral, brisk capillary refill    Pulmonary/Chest: Effort normal and breath sounds normal. No respiratory distress. She has no wheezes. She has no rales.   Abdominal: Soft. Bowel sounds are normal. She exhibits no distension. There is no tenderness.   Musculoskeletal: Normal range of motion. She exhibits edema (trace bilateral).   Neurological: She is alert and oriented to person, place, and time.   Skin: Skin is warm and  dry.     Labs:       Recent Labs  Lab 01/07/17 0442 01/08/17 0450 01/09/17 0442     144 138  138 137  137   K 4.0  4.0 3.8  3.8 3.6  3.6   CL 94*  94* 91*  91* 91*  91*   CO2 38*  38* 37*  37* 36*  36*   BUN 34*  34* 28*  28* 22  22   CREATININE 1.3  1.3 1.2  1.2 1.0  1.0   *  155* 152*  152* 127*  127*   ANIONGAP 12  12 10  10 10  10       Recent Labs  Lab 01/07/17  0442 01/08/17 0450 01/09/17 0442   AST 33 29 20   ALT 40 37 30   ALKPHOS 61 57 52*   BILITOT 1.3* 1.4* 1.3*   ALBUMIN 3.4* 3.2* 3.0*       Recent Labs  Lab 01/07/17 0442 01/08/17 0450 01/09/17 0442   CALCIUM 9.2  9.2  --  9.2  9.2 8.9  8.9   MG 2.2  < > 1.9 1.7  1.7   PHOS 3.1  --  3.5 3.3   < > = values in this interval not displayed.    Estimated Creatinine Clearance: 76.5 mL/min (based on Cr of 1).   Recent Labs  Lab 01/07/17 0442 01/08/17 0450 01/09/17 0442   WBC 13.72* 13.18* 14.20*   HGB 14.0 14.3 13.8   HCT 44.2 45.5 43.7    263 242   GRAN 74.6*  10.2* 68.1  9.0* 62.9  8.9*       Recent Labs  Lab 01/07/17 0442 01/08/17 0450 01/09/17 0442   INR 1.3* 1.2 1.2       Recent Labs  Lab 01/05/17  0023   LACTATE 1.8       Recent Labs  Lab 01/05/17 0023 01/08/17 0450   BNP <10 354*            Assessment and Plan:   62 y/o with no PMHx, recently diagnosed with heart failure, [facility] with no evidence of CAD, being transferred from [facility] in [place] for advanced heart failure consideration.    Acute Decompensated Heart Failure, New Onset  -EF 15-20%, LVEDD 5.4 cm, NYHA IV symptoms  - Increase Lasix to 80mg PO BID as patient was net positive yesterday  - Plan to DC dobutamine and heparin first thing tomorrow AM 1/10/17 with plans for RHC tomorrow afternoon off of dobutamine  - Lisinopril 5 mg daily.   - Aldactone 25mg daily.   -2 g Na dietary restriction, 1500 mL fluid restriction, strict I/Os     LV Apical Thrombus  -Continue heparin gtt, will transition to PO for outpatient when  appropriate     Leukocytosis, trending down  -Was given solumedrol at OSH  -BCx 1/5/17 NGTD, will f/u final results  -D/cd Abx.    Patient discussed and examined with attending physician, Dr. Leiva.      Signed:    Louis Jenkins MD  Cardiology Fellow, PGY-5  Pager: [phone]  1/9/2017 7:45 AM

## 2017-01-09 NOTE — PT/OT/SLP PROGRESS
Physical Therapy  Treatment    Vaishnavi Baca   MRN: [id number]   Admitting Diagnosis: Acute decompensated heart failure    PT Received On: 17  PT Start Time: 1445     PT Stop Time: 1455    PT Total Time (min): 10 min       Billable Minutes:  Gait [id number]    Treatment Type: Treatment  PT/PTA: PT     PTA Visit Number: 0       General Precautions: Standard, fall  Orthopedic Precautions: N/A   Braces:      Do you have any cultural, spiritual, Nondenominational conflicts, given your current situation?: None    Subjective:  Communicated with RN prior to session.      Pain Ratin/10              Pain Rating Post-Intervention: 0/10    Objective:   Patient found with: telemetry, peripheral IV    Functional Mobility:  Bed Mobility:   Supine to Sit: Supervision    Transfers:  Sit <> Stand Assistance: Supervision no UE support  Sit <> Stand Assistive Device: No Assistive Device    Gait:   Gait Distance: 80ft with no device and CGA.  mild medial lateral instability  Assistance 1: Contact Guard Assistance  Gait Assistive Device: No device  Gait Pattern: reciprocal    Stairs:  Pt ascended/descend 4 stair(s) with No Assistive Device with left with Contact Guard Assistance.     Balance:   Static Sit: GOOD: Takes MODERATE challenges from all directions  Dynamic Sit: GOOD: Maintains balance through MODERATE excursions of active trunk movement  Static Stand: FAIR+: Takes MINIMAL challenges from all directions  Dynamic stand: FAIR+: Needs CLOSE SUPERVISION during gait and is able to right self with minor LOB     Therapeutic Activities and Exercises:  Pt educated on but due to fatigue and nausea did not perform repeated sit to stand without UE, standing hip abduction, and standign heel raises.  She was also educated on benefit of regular ambulation with family or nursing and benefit of practicing sit to stand without UE.     AM-PAC 6 CLICK MOBILITY  How much help from another person does this patient currently need?   1 = Unable,  Total/Dependent Assistance  2 = A lot, Maximum/Moderate Assistance  3 = A little, Minimum/Contact Guard/Supervision  4 = None, Modified [place]/Independent    Turning over in bed (including adjusting bedclothes, sheets and blankets)?: 3  Sitting down on and standing up from a chair with arms (e.g., wheelchair, bedside commode, etc.): 3  Moving from lying on back to sitting on the side of the bed?: 3  Moving to and from a bed to a chair (including a wheelchair)?: 3  Need to walk in hospital room?: 3  Climbing 3-5 steps with a railing?: 3  Total Score: 18    AM-PAC Raw Score CMS G-Code Modifier Level of Impairment Assistance   6 % Total / Unable   7 - 9 CM 80 - 100% Maximal Assist   10 - 14 CL 60 - 80% Moderate Assist   15 - 19 CK 40 - 60% Moderate Assist   20 - 22 CJ 20 - 40% Minimal Assist   23 CI 1-20% SBA / CGA   24 CH 0% Independent/ Mod I     Patient left supine with all lines intact and call button in reach.    Assessment:  Vaishnavi Baca is a 61 y.o. female with a medical diagnosis of Acute decompensated heart failure and presents with improving gait distance and strengh.  She still has some endurance deficits but these will likely resolve with continued ambulation with nursing or family support.  .    Rehab identified problem list/impairments: Rehab identified problem list/impairments: weakness, impaired balance, impaired endurance, impaired cardiopulmonary response to activity, impaired functional mobilty    Rehab potential is good.    Activity tolerance: Good    Discharge recommendations: Discharge Facility/Level Of Care Needs: home     Barriers to discharge: Barriers to Discharge: Inaccessible home environment    Equipment recommendations: Equipment Needed After Discharge: none     GOALS:   Physical Therapy Goals        Problem: Physical Therapy Goal    Goal Priority Disciplines Outcome Goal Variances Interventions   Physical Therapy Goal     PT/OT, PT Ongoing (interventions implemented as  appropriate)     Description:  Goals to be met by: 2017     Patient will increase functional independence with mobility by performin. Supine to sit with Modified [place]  2. Sit to supine with Modified [place]  3. Sit to stand transfer with Modified [place]  4. Bed to chair transfer with Modified [place]   5. Gait  x 200 feet with Supervision   6. Ascend/descend 5 stairs with no Handrails Stand-by Assistance                 PLAN:    Patient to be seen 3 x/week  to address the above listed problems via gait training, therapeutic activities, therapeutic exercises  Plan of Care expires: 17  Plan of Care reviewed with: patient         Ibrahima Harryers, PT  2017

## 2017-01-09 NOTE — PLAN OF CARE
Problem: Patient Care Overview  Goal: Plan of Care Review  Outcome: Ongoing (interventions implemented as appropriate)  Reviewed poc with pt verbalized understanding  vss  nad

## 2017-01-09 NOTE — PROGRESS NOTES
Bedside CVP performed per MD orders.  CVP 3 noted.  Pt tolerated procedure well.  Dr. Jenkins notified; instructed to hold 1300 dose of Lasix 80mg PO.  Orders implemented as instructed. Will continue to monitor.

## 2017-01-10 NOTE — PLAN OF CARE
Problem: Patient Care Overview  Goal: Plan of Care Review  Outcome: Ongoing (interventions implemented as appropriate)  Pt remained free of falls and injuries this shift. No complaints of pain or SOB. Pt has a continuous infusion of Dobutamine, tolerating well. Pt has a RIJ TLC, dressing is clean, dry and intact. Heparin drip dc'd for RHC today. No significant events this shift. VSS, NAD, will continue to monitor.

## 2017-01-10 NOTE — PLAN OF CARE
Problem: Patient Care Overview  Goal: Plan of Care Review  Outcome: Revised  Pt experienced and unwitnessed fall this shift.  Post-fall CT head performed.   gtt to be reinitiated.  Lasix discontinued. Nausea managed with IV antiemetics.  RIJ TLC removed this shift.  Discussed with pt and  fall risk precautions and need to call for assistance.  Pt and family verbalize understanding of plan of care.     Problem: Fall Risk (Adult)  Goal: Identify Related Risk Factors and Signs and Symptoms  Related risk factors and signs and symptoms are identified upon initiation of Human Response Clinical Practice Guideline (CPG)   Outcome: Revised  FALL 1/10/17 1420  Goal: Absence of Falls  Patient will demonstrate the desired outcomes by discharge/transition of care.   Outcome: Revised  FALL 1/10/17

## 2017-01-10 NOTE — SIGNIFICANT EVENT
Patient noted to be hypotensive and orthostatic. Had fall in bathroom.    - STAT CT head non con  - 250cc bolus  - Restart dobutamine    Signed:    Louis Jenkins MD  Cardiology Fellow, PGY-5  Pager: [phone]  1/10/2017 4:22 PM

## 2017-01-10 NOTE — PROGRESS NOTES
01/09/17 1810 01/09/17 1814   Vital Signs   Temp 97.5 °F (36.4 °C) --    Temp src Oral --    Pulse (!) 115 --    Heart Rate Source Apical --    Resp 18 --    SpO2 (!) 91 % --    O2 Device (Oxygen Therapy) room air --    BP (!) 89/51 (!) 86/50   MAP (mmHg) 64 --    BP Location Left arm Left arm   BP Method Automatic Manual   Patient Position Lying Sitting   notified above findings to on call hts physician, pt has 60mg of lasix due at 1800- orders given to recheck  B/p in 30 min, don not given lasix yet  and call [phone] w/ results- informed primary nurse

## 2017-01-10 NOTE — PROGRESS NOTES
Spoke with Fahad in CT about post-fall STAT CT.  Stated he will put in for escort.  Unable to leave floor at this time d/t pt load.  Requested for charge/float nurse to escort pt to CT.  Dr. Jenkins notified of delay.

## 2017-01-10 NOTE — PROGRESS NOTES
Pt escorted to CT via bed for post-fall CT.   Pre-procedure orders implemented as ordered.  Pt showing no S/S of distress; AAOx3.  Pt transported with telemetry.  Awaiting return.    1630  Pt from CT via bed with nursing staff.  Pt in no distress, resting comfortably in bed.  Bed locked, in lowest position, siderails up x2.  Call bell in reach.  Pt instructed to call for assistance.  Will continue to monitor.

## 2017-01-10 NOTE — PHYSICIAN QUERY
PT Name: Vaishnavi Baca  MR #: [id number]     Physician Query Form - Diagnosis Clarification    Reviewer  Ext Surinder Keyes,XIAO [id number]    This form is a permanent document in the medical record.     Query Date: January 10, 2017    By submitting this query, we are merely seeking further clarification of documentation.  Please utilize your independent clinical judgment when addressing the question(s) below.     (The Medical record reflects the following:)      Findings Supporting Clinical Information Location in Medical Record   Pneumonia Concern for PNA   - Procalcitonin   - Blood cultures x 2   - Continue CTX/Azithromycin for a 7 day course (end date 1/8/17 as it was started at the OSH)   - DC steroids as this will contribute to her fluid overload state    azithromycin 500 mg IVPB Every 24 hours  cefTRIAXone (ROCEPHIN) 2 g in IVPB Every 24 Hours    IMPRESSION:     Pulmonary edema versus pneumonia.     Blood Culture, Routine: No growth after 5 days.   1/4 H&P              1/5 MAR  1/5 MAR    1/5 Chest X Ray Report    1/5 Labs     Please clarify if the                                     Pneumonia                 diagnosis has been:                 [   ]   Ruled In               [   ]   Ruled In, Now Resolved               [   ]   Resolved Prior to My Assessment               [   ]   Ruled Out               [   ]   Clinically insignificant               [ x ]   Clinically undetermined as patient had been started on treatment far prior to admission               [   ]   Other/Clarification of findings:_____________________________________       Please document in your progress notes daily for the duration of treatment, until resolved, and include in your discharge summary.

## 2017-01-10 NOTE — PROGRESS NOTES
Pt experienced unwitnessed fall while trying to clean herself after using the bathroom. Pt fell to the floor in the shower.  Pt's  notified staff of fall from the room.  Abrasions to the head x2; sites reddened and excoriated, but no bleeding noted.  Pt AAOx4 immediately after fall, denying any pain.  Pt continues to experience nausea.  Phenergan had been administered shortly prior to fall; held at this time.  BP has been low; Dr. Jenkins previously aware of hypotension.  Other VSS.  Prior to fall, pt was escorted to bathroom by PCT, who instructed her to call for assistance before getting up.  Non-slip socks in place at time of fall; fall risk signs posted in room as well.  Dr. Jenkins notified of fall, injuries, and updated VS.  CT head and NS 250cc bolus ordered.  Orders implemented as instructed.   at the bedside during time of fall.  Debriefed pt and  after fall, including further reinforcement of fall risks and safety measures.  Bedside commode at the bedside for future use while stabilizing BP.  Pt and family verbalize understanding of risks and need to call for assistance.         01/10/17 1330 01/10/17 1420   Vital Signs   Temp 97.5 °F (36.4 °C) 97.5 °F (36.4 °C)   Temp src Oral Oral   Pulse 99 94   Heart Rate Source Monitor Monitor   Resp 18 18   SpO2 (!) 94 % 98 %   O2 Device (Oxygen Therapy) room air room air   BP (!) 71/41 (!) 69/44   MAP (mmHg) 52 52   BP Location Right arm Left arm   BP Method Automatic Automatic   Patient Position Lying Lying

## 2017-01-10 NOTE — PROGRESS NOTES
Left Ventricular Assist Device (LVAD) and Transplant Recipient Adult Psychosocial Assessment    Vaishnavi Baca  [address]  [place] [address]     Physical address:    [address] [place] [address]    Pt is a 5 hour drive from [place]    No relevant phone numbers on file.   Cell  [phone]   Work  There is no work phone number on file.  E-mail  No e-mail address on record    Sex: female  YOB: 1955  Age: 61 y.o.    Encounter Date: 1/10/2017  U[place] Citizen: yes  Primary Language: English   Needed: no    Emergency Contact:  Name: Romeo Baca  Relationship:   Address: same as pt  Working? No, retired  Drives? yes  Phone Numbers:  [phone] (mobile)    Family/Social Support:   Number of dependents/: n/a  Marital history:  over 40 years  Other family dynamics: Pt reports parents and 4 of her 5 siblings are . Pt's sister Pita lives near pt and is very supportive. Pt has 2 adult children, son Johnnie lives in [place] and daughter Shauna lives a couple miles from pt. Pt reports she has other supportive family members, including brother in law Barney and cousins Doris and Leti.     Household Composition:  Pt lives with  Romeo    Do you and your caregivers have access to reliable transportation? yes  PRIMARY CAREGIVER: Romeo will be primary caregiver, phone number [phone].      provided in-depth information to Patient and Caregiver regarding  regarding pre- and post-LVAD and pre- and post-transplant caregiver role.   strongly encourages Patient and Caregiver to have concrete plan regarding post-transplant care giving, including back-up caregiver(s) to ensure care giving needs are met as needed.    Patient and Caregiver states understanding all aspects of caregiver role/commitment. .      Patient and Caregiver verbalizes understanding of the education provided today.       remains available. Patient and  Caregiver agree to contact  in a timely manner if concerns arise.      Able to take time off work without financial concerns: yes.     Additional Significant Others who will Assist with LVAD/Transplant:  Name: Pita Cedeno  Age: 59  Relationship: sister  Does person drive? yes   Working? No  Phone: [phone]    Name: Shauna Rizvi  Age: 36  City: [place], [place]  Relationship: daughter  Does person drive? 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  Verbally reviewed LW/HCPA information.   provided patient with copy of LW/HCPA documents and provided education on completion of forms    Highest Education Level: High School (9-12) or GED  Reading Ability: 12th grade  Reports difficulty with: N/A  Learns Best By:  Hands on     Status: no  VA Benefits: no     Working for Income: No  If no, reason not working: Patient Choice - Retired  Spouse/Significant Other Employment: also retired    Disabled: no    Monthly Income:  Other household members total: $1800   Pt has not started receiving care home check yet. Pt is also applying for SSD.  Able to afford all costs now and if transplanted or receives LVAD, including medications: yes. Pt reports some concern regarding finances. Pt has recently retired and is not sure when her first care home check will come in.   Pt reports secure power source? yes  Pt reports ability to afford monthly electric bill? yes  Pt reports ability to afford LVAD dressing supplies? yes  Patient and Caregiver verbalizes understanding of personal responsibilities related to LVAD and transplant costs and the importance of having a financial plan to ensure that patients LVAD and transplant costs are fully covered.       provided fundraising information/education.  Patient and Caregiver verbalizes  understanding.   remains available.    Insurance:   Payor/Plan Subscr  Sex Relation Sub. Ins. ID Effective Group Num   1. BLUE CROSS BL* TOMAS MORA 1955 Female  [id number]                                   [address] [address]     Primary Insurance (for UNOS reporting): Private Insurance  Secondary Insurance (for UNOS reporting): None  Patient and Caregiver verbalizes clear understanding that patient may experience difficulty obtaining and/or be denied insurance coverage post-surgery. This includes and is not limited to disability insurance, life insurance, health insurance, burial insurance, long term care insurance, and other insurances.      Patient and Caregiver also reports understanding that future health concerns   related to or unrelated to LVAD or transplantation may not be covered by patient's insurance.  Resources and information provided and reviewed.      Patient and Caregiver provides verbal permission to release any necessary information to outside resources for patient care and discharge planning.  Resources and information provided are reviewed.      Infusion Service: patient utilizing? no  Home Health: patient utilizing? no  DME: no  Pulmonary/Cardiac Rehab: no  ADLS:  Prior to admission, pt was independent with ADLs and was driving    Adherence:   Pt reports a high level of adherence.  Adherence education and counseling provided.     Per History Section:  Past Medical History   Diagnosis Date    Apical mural thrombus 2017    NICM (nonischemic cardiomyopathy) 2017    NICM (nonischemic cardiomyopathy) 2017     Social History   Substance Use Topics    Smoking status: Former Smoker     Packs/day: 0.25     Types: Cigarettes     Start date: 1977     Quit date: 2016    Smokeless tobacco: Not on file    Alcohol use Not on file     History   Drug Use Not on file     History   Sexual Activity    Sexual activity: Not on file       Per Today's  Psychosocial:  Tobacco: pt reports quit smoking in December 2016. Highest level of use was 1/2 ppd  Alcohol: none, patient denies any use.  Illicit Drugs/Non-prescribed Medications: none, patient denies any use.    Patient and Caregiver states clear understanding of the potential impact of substance use as it relates to LVAD and transplant candidacy and is aware of possible random substance screening.  Substance abstinence/cessation counseling, education and resources provided and reviewed.     Arrests/DWI/Treatment/Rehab: patient denies    Psychiatric History:    Mental Health: pt denies  Psychiatrist/Counselor: pt denies  Medications:  Pt denies  Suicide/Homicide Issues: pt denies   Safety at home: pt confirms    Knowledge: Patient and Caregiver states having clear understanding and realistic expectations regarding the potential risks and potential benefits LVAD implantation and organ transplantation and organ donation and agrees to discuss with health care team members and support system members, as well as to utilize available resources and express questions and/or concerns in order to further facilitate the pt informed decision-making.  Resources and information provided and reviewed.     Patient and Caregiver is aware of [facility]'s affiliation and/or partnership with agencies in home health care, LTAC, SNF, [facility], and other hospitals and clinics.    Understanding: Patient and Caregiver reports having a clear understanding of the many lifetime commitments involved with being an LVAD and transplant recipient, including costs, compliance, medications, lab work, procedures, appointments, concrete and financial planning, preparedness, timely and appropriate communication of concerns, abstinence (ETOH, tobacco, illicit non-prescribed drugs), adherence to all health care team recommendations, support system and caregiver involvement, appropriate and timely resource utilization and follow-through, mental health  "counseling as needed/recommended, and patient and caregiver responsibilities.  Social Service Handbook, resources and detailed educational information provided and reviewed.  Educational information provided.    Patient and Caregiver also reports current and expected compliance with health care regimen.       Patient and Caregiver reports a clear understanding that risks and benefits may be involved with LVAD heart failure treatment and organ transplantation and with organ donation.     Patient and Caregiver also reports clear understanding that psychosocial risk factors may affect patient, and include but are not limited to feelings of depression, generalized anxiety, anxiety regarding dependence on others, post traumatic stress disorder, feelings of guilt and other emotional and/or mental concerns, and/or exacerbation of existing mental health concerns.  Detailed resources provided and discussed.      Patient and Caregiver agrees to access appropriate resources in a timely manner as needed and/or as recommended, and to communicate concerns appropriately.     Patient and Caregiver also reports a clear understanding of treatment options available.      Feelings or Concerns: No concerns voiced or indicated at this time. Pt reports she is willing to move forward with whatever team recommends.    Coping: Pt coping adequately at this time.     Goals: Pt states "I just want to get back home and on my feet. And I want to play with my grand baby."    Interview Behavior: Patient presents as alert and oriented x 4, pleasant, calm and asking and answering questions appropriately.  Pt's  at bedside and presents as engaged and supportive of pt.         Transplant Social Work - Candidacy  Assessment/Plan:     Psychosocial Suitability: Patient presents as a suitable candidate for LVAD at this time. Based on psychosocial risk factors, patient presents as medium risk, due to pt expresses financial concern. Pt is retired, but " does not know when she will start receiving a check. Pt reports plan to start application for SSD. Pt is not suitable for transplant at this time, due to recent tobacco use. Pt reports she stopped smoking in September 2016.      Cherie Apple LMSW

## 2017-01-10 NOTE — PROGRESS NOTES
Heart Transplant Progress Note  Attending Physician: Comfort Pereira MD  Hospital Day: 7    Subjective:   Interval History: Patient had heparin and dobutamine stopped with plans for RHC this afternoon.    Medications:   Continuous Infusions:     Scheduled Meds:   furosemide  60 mg Oral BID    furosemide  80 mg Oral Once    lisinopril  5 mg Oral Daily    pantoprazole  40 mg Oral Daily    senna-docusate 8.6-50 mg  1 tablet Oral BID    sodium chloride 0.9%  3 mL Intravenous Q8H    spironolactone  25 mg Oral Daily    trazodone  50 mg Oral QHS     PRN Meds:calcium carbonate, heparin (PORCINE), heparin (PORCINE), ondansetron, promethazine (PHENERGAN) IVPB  Objective:     Vitals:  Temp:  [97.5 °F (36.4 °C)-98.1 °F (36.7 °C)]   Pulse:  [106-127]   Resp:  [18]   BP: (86-97)/(48-64)   SpO2:  [91 %-97 %]    I/O's:    Intake/Output Summary (Last 24 hours) at 01/10/17 0733  Last data filed at 01/09/17 2200   Gross per 24 hour   Intake             1190 ml   Output              900 ml   Net              290 ml      Constitutional: She is oriented to person, place, and time. She appears well-developed and well-nourished.   HENT:   Head: Normocephalic and atraumatic.   Eyes: EOM are normal. Pupils are equal, round, and reactive to light.   Neck: Normal range of motion. Neck supple. JVD present.   Cardiovascular: Normal rate, regular rhythm, S1 normal and S2 normal. Frequent extrasystoles are present. Exam reveals gallop and S3.   Lower extremities warm to touch bilateral, brisk capillary refill    Pulmonary/Chest: Effort normal and breath sounds normal. No respiratory distress. She has no wheezes. She has no rales.   Abdominal: Soft. Bowel sounds are normal. She exhibits no distension. There is no tenderness.   Musculoskeletal: Normal range of motion. She exhibits edema (trace bilateral).   Neurological: She is alert and oriented to person, place, and time.   Skin: Skin is warm and dry.     Labs:       Recent Labs  Lab  01/08/17  0450 01/09/17  0442 01/10/17  0435     138 137  137 134*  134*   K 3.8  3.8 3.6  3.6 3.7  3.7   CL 91*  91* 91*  91* 92*  92*   CO2 37*  37* 36*  36* 33*  33*   BUN 28*  28* 22  22 21  21   CREATININE 1.2  1.2 1.0  1.0 1.2  1.2   *  152* 127*  127* 160*  160*   ANIONGAP 10  10 10  10 9  9       Recent Labs  Lab 01/08/17  0450 01/09/17  0442 01/10/17  0435   AST 29 20 18   ALT 37 30 23   ALKPHOS 57 52* 54*   BILITOT 1.4* 1.3* 1.1*   ALBUMIN 3.2* 3.0* 3.0*       Recent Labs  Lab 01/08/17  0450 01/09/17  0442  01/10/17  0435   CALCIUM 9.2  9.2 8.9  8.9  --  9.1  9.1   MG 1.9 1.7  1.7  < > 1.8   PHOS 3.5 3.3  --  3.2   < > = values in this interval not displayed.    Estimated Creatinine Clearance: 63.7 mL/min (based on Cr of 1.2).   Recent Labs  Lab 01/08/17  0450 01/09/17  0442 01/10/17  0435   WBC 13.18* 14.20* 13.72*   HGB 14.3 13.8 13.5   HCT 45.5 43.7 42.1    242 232   GRAN 68.1  9.0* 62.9  8.9* 64.3  8.8*       Recent Labs  Lab 01/08/17 0450 01/09/17 0442 01/10/17  0435   INR 1.2 1.2 1.1       Recent Labs  Lab 01/05/17  0023   LACTATE 1.8       Recent Labs  Lab 01/05/17  0023 01/08/17  0450 01/10/17  0435   BNP <10 354* 158*            Assessment and Plan:    60 y/o with no PMHx, recently diagnosed with heart failure, [facility] with no evidence of CAD, being transferred from [facility] in [place] for advanced heart failure consideration.     Acute Decompensated Heart Failure, New Onset  - EF 15-20%, LVEDD 5.4 cm, NYHA IV symptoms  - Lasix 40mg PO BID  - Patient off of dobutamine and heparin this AM  - RHC this afternoon to determine need for dobutamine  - Lisinopril 5 mg daily.   - Aldactone 25mg daily.   -2 g Na dietary restriction, 1500 mL fluid restriction, strict I/Os      LV Apical Thrombus  - Restart heparin GTT once RHC complete      Leukocytosis, trending down  - Was given solumedrol at OSH  - BCx 1/5/17 NGTD, will f/u final results  - D/cd  Abx.  - Pull R IJ central line     Patient discussed and examined with attending physician, Dr. Leiva.       Signed:    Louis Jenkins MD  Cardiology Fellow, PGY-5  Pager: [phone]  1/10/2017 7:33 AM

## 2017-01-10 NOTE — PROGRESS NOTES
Pt continues to experience hypotension with nausea and dry heaving.   continues to infuse.  Dr. Prather notified and at the bedside.  Will continue to monitor.

## 2017-01-11 NOTE — CONSULTS
Double lumen PICC placed in right brachial vein, 38cm in length with 0cm exposed. Arm circumference 35cm.  LOT#[id number]

## 2017-01-11 NOTE — PROCEDURES
"Vaishnavi Baca is a 61 y.o. female patient.    Temp: 97.7 °F (36.5 °C) (01/11/17 0800)  Pulse: (!) 120 (01/11/17 1100)  Resp: 17 (01/11/17 0800)  BP: 106/63 (01/11/17 0800)  SpO2: (!) 94 % (01/11/17 0800)  Weight: 98.6 kg (217 lb 6 oz) (01/11/17 0600)  Height: 5' 11" (180.3 cm) (01/04/17 5584)    PICC  Date/Time: 1/11/2017 11:46 AM  Performed by: BUFFY POSADA  Consent Done: Yes  Time out: Immediately prior to procedure a time out was called to verify the correct patient, procedure, equipment, support staff and site/side marked as required  Indications: med administration and hemodynamic monitoring  Anesthesia: local infiltration  Local anesthetic: lidocaine 1% without epinephrine  Anesthetic Total (mL): 0  Preparation: skin prepped with ChloraPrep  Skin prep agent dried: skin prep agent completely dried prior to procedure  Sterile barriers: all five maximum sterile barriers used - cap, mask, sterile gown, sterile gloves, and large sterile sheet  Hand hygiene: hand hygiene performed prior to central venous catheter insertion  Location details: right brachial  Catheter type: double lumen  Catheter size: 5 Fr  Catheter Length: 38cm    Ultrasound guidance: yes  Vessel Caliber: medium and patent, compressibility normal  Vascular Doppler: not done  Needle advanced into vessel with real time Ultrasound guidance.  Guidewire confirmed in vessel.  Image recorded and saved.  Sterile sheath used.  no esophageal manometryNumber of attempts: 1  Post-procedure: blood return through all ports and sterile dressing applied  Estimated blood loss (mL): 0  Specimens: No  Implants: No  Assessment: placement verified by x-ray        Karely Rios  1/11/2017  "

## 2017-01-11 NOTE — INTERVAL H&P NOTE
Cardiac Cath Lab History and Physical  - there has been no significant interval change since this H&P visit.     History of Present Illness:  61 y.o. with NICM (15%) admitted for ADHF presents to cath lab for assessment of hemodynamics to determine need for home inotropes. She has no new complaints.   -->Was originally supposed to perform RHC yesterday off . However, just prior to procedure, she had a  hypotensive episode and a fall in her room. She was placed back on inotropes for safety the procedure was postponed until today.     Assessment/Plan:  NICMP admitted with ADHF  - I have explained the risks, benefits, and alternatives of the procedure in detail. The patient expresses understanding and all questions have been answered. The patient agrees to the proceed as planned.  - proceed with RHC via RIJ  - Micropuncture access needle will be used to minimize bleeding risk.    GARETT Hansen MD  [facility] Cardiology Fellow

## 2017-01-11 NOTE — SIGNIFICANT EVENT
Called by nursing as the patient's BP was 73 systolic. Asymptomatic. Discussed with RHC attending, Dr. Pozo. Plan to perform RHC ideally tomorrow, if BP is more consistent.    - DC Lisinopril and Spironolactone  - Restart heparin GTT as at this point it was been off since 0700 on 1/10/17 in the setting of LV thrombus.  - Will DC heparin at midnight tonight    Signed:    Louis Jenkins MD  Cardiology Fellow, PGY-5  Pager: [phone]  1/11/2017 3:38 PM

## 2017-01-11 NOTE — PROGRESS NOTES
Heart Transplant Progress Note  Attending Physician: Comfort Pereira MD  Hospital Day: 8    Subjective:   Interval History: Patient had episodes of hypotension and a fall yesterday. Ct head negative. This was off of dobutamine. Dobutamine restarted and symptoms improved, though they have not completely abated.    Medications:   Continuous Infusions:   DOBUTamine 2.5 mcg/kg/min (01/10/17 1630)       Scheduled Meds:   lisinopril  5 mg Oral Daily    pantoprazole  40 mg Oral Daily    senna-docusate 8.6-50 mg  1 tablet Oral BID    sodium chloride 0.9%  3 mL Intravenous Q8H    spironolactone  25 mg Oral Daily    trazodone  50 mg Oral QHS     PRN Meds:calcium carbonate, ondansetron, promethazine (PHENERGAN) IVPB  Objective:     Vitals:  Temp:  [97.1 °F (36.2 °C)-98.2 °F (36.8 °C)]   Pulse:  []   Resp:  [18]   BP: ()/(41-63)   SpO2:  [91 %-98 %]    I/O's:    Intake/Output Summary (Last 24 hours) at 01/11/17 0738  Last data filed at 01/11/17 0636   Gross per 24 hour   Intake           776.31 ml   Output              400 ml   Net           376.31 ml      Constitutional: She is oriented to person, place, and time. She appears well-developed and well-nourished.   HENT:   Head: Normocephalic and atraumatic.   Eyes: EOM are normal. Pupils are equal, round, and reactive to light.   Neck: Normal range of motion. Neck supple. JVD present.   Cardiovascular: Normal rate, regular rhythm, S1 normal and S2 normal. Frequent extrasystoles are present. Exam reveals gallop and S3.   Lower extremities warm to touch bilateral, brisk capillary refill    Pulmonary/Chest: Effort normal and breath sounds normal. No respiratory distress. She has no wheezes. She has no rales.   Abdominal: Soft. Bowel sounds are normal. She exhibits no distension. There is no tenderness.   Musculoskeletal: Normal range of motion. She exhibits edema (trace bilateral).   Neurological: She is alert and oriented to person, place, and time.   Skin:  Skin is warm and dry.     Labs:       Recent Labs  Lab 01/09/17  0442 01/10/17  0435 01/11/17  0507     137 134*  134* 135*  135*   K 3.6  3.6 3.7  3.7 3.6  3.6   CL 91*  91* 92*  92* 95  95   CO2 36*  36* 33*  33* 29  29   BUN 22  22 21  21 25*  25*   CREATININE 1.0  1.0 1.2  1.2 1.3  1.3   *  127* 160*  160* 108  108   ANIONGAP 10  10 9  9 11  11       Recent Labs  Lab 01/09/17  0442 01/10/17  0435 01/11/17  0507   AST 20 18 27   ALT 30 23 28   ALKPHOS 52* 54* 53*   BILITOT 1.3* 1.1* 1.0   ALBUMIN 3.0* 3.0* 3.0*       Recent Labs  Lab 01/09/17  0442  01/10/17  0435 01/11/17  0507   CALCIUM 8.9  8.9  --  9.1  9.1 9.1  9.1   MG 1.7  1.7  < > 1.8 2.0   PHOS 3.3  --  3.2 3.7   < > = values in this interval not displayed.    Estimated Creatinine Clearance: 58.8 mL/min (based on Cr of 1.3).   Recent Labs  Lab 01/09/17  0442 01/10/17  0435 01/11/17  0507   WBC 14.20* 13.72* 13.80*   HGB 13.8 13.5 13.4   HCT 43.7 42.1 41.9    232 227   GRAN 62.9  8.9* 64.3  8.8* 63.4  8.7*       Recent Labs  Lab 01/09/17  0442 01/10/17  0435 01/11/17  0507   INR 1.2 1.1 1.1       Recent Labs  Lab 01/05/17  0023   LACTATE 1.8       Recent Labs  Lab 01/05/17  0023 01/08/17  0450 01/10/17  0435   BNP <10 354* 158*          Imaging:   CT Head 1/10/17  No evidence of recent hemorrhage or other acute intracranial pathology.    Remote right thalamic lacunar type infarction.    Assessment and Plan:   60 y/o with no PMHx, recently diagnosed with heart failure, [facility] with no evidence of CAD, being transferred from [facility] in [place] for advanced heart failure consideration.      Acute Decompensated Heart Failure, New Onset  - EF 15-20%, LVEDD 5.4 cm, NYHA IV symptoms  - Lasix being held at this time secondary to episodes of orthostatic hypotension  - Dobutamine 2.5, will need PICC line placed  - RHC today to assess need for inotropes and advanced options workup  - Lisinopril 5 mg daily.    - Aldactone 25mg daily.   -2 g Na dietary restriction, 1500 mL fluid restriction, strict I/Os      LV Apical Thrombus  - Restart heparin GTT once RHC complete    N/V  - Abdominal Xray  - Consider GI consult if symptoms do not improve with improvement in cardiac output      Leukocytosis, trending down  - Was given solumedrol at OSH  - BCx 1/5/17 NGTD, will f/u final results  - D/cd Abx.      Patient discussed and examined with attending physician, Dr. Leiva.         Signed:    Louis Jenkins MD  Cardiology Fellow, PGY-5  Pager: [phone]  1/11/2017 7:38 AM

## 2017-01-12 PROBLEM — Z76.82 ORGAN TRANSPLANT CANDIDATE: Status: ACTIVE | Noted: 2017-01-01

## 2017-01-12 NOTE — CONSULTS
Consult Note  Cardiothoracic Surgery    Consults  SUBJECTIVE:     History of Present Illness:  Patient is a 61 y.o. female with NICM (15%) admitted for ADHF with no other known prior medical history who presents as a transfer from [facility] in [place] for higher level of care for ADHF in the setting of severe NICM. She states that she first became ill in  November when she had a virus that she never feels like she recovered from.  [facility] consideration of advanced option.     She endorses orthopnea and BURGESS. 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      Scheduled Meds:   heparin (PORCINE)  70 Units/kg (Order-Specific) Intravenous Once    pantoprazole  40 mg Oral Daily    senna-docusate 8.6-50 mg  1 tablet Oral BID    sodium chloride 0.9%  10 mL Intravenous Q6H    sodium chloride 0.9%  3 mL Intravenous Q8H    trazodone  50 mg Oral QHS     Infusions/Drips:   DOBUTamine 2.5 mcg/kg/min (01/11/17 1202)    heparin (porcine) in 5 % dex 17 Units/kg/hr (01/12/17 1100)     PRN Meds:aluminum & magnesium hydroxide-simethicone, calcium carbonate, nitroGLYCERIN, ondansetron, promethazine (PHENERGAN) IVPB, Flushing PICC Protocol **AND** sodium chloride 0.9% **AND** sodium chloride 0.9%    Review of patient's allergies indicates:  No Known Allergies    Past Medical History   Diagnosis Date    Apical mural thrombus 01/04/2017    NICM (nonischemic cardiomyopathy) 01/04/2017    NICM (nonischemic cardiomyopathy) 01/04/2017     History reviewed. No pertinent past surgical history.  History reviewed. No pertinent family history.  Social History   Substance Use Topics    Smoking status: Former Smoker     Packs/day: 0.25     Types: Cigarettes     Start date: 1/6/1977     Quit date: 9/6/2016    Smokeless tobacco: None    Alcohol use None        Review of Systems:  Constitution: Negative for fever or chills. Negative for weight loss or gain.    HENT: Negative for sore throat or headaches. Negative for rhinorrhea.  Eyes: Negative for blurred or double vision.   Cardiovascular: See above  Pulmonary: Negative for SOB. Negative for cough.   Gastrointestinal: Negative for abdominal pain. Negative for nausea/ vomiting. Negative for diarrhea.   : Negative for dysuria.   Neurological: Negative for focal weakness or sensory changes.  Skin: negative for rashes or lesions  MS: negative for arthralgia's or myalgia's    OBJECTIVE:     Vital Signs (Most Recent)  Temp: 99 °F (37.2 °C) (01/12/17 0800)  Pulse: (!) 124 (01/12/17 1048)  Resp: 15 (01/12/17 1021)  BP: (!) 86/54 (01/12/17 1041)  SpO2: 95 % (01/12/17 1021)    Admission Weight: 109.3 kg (240 lb 15.4 oz) (01/04/17 2234)   Most Recent Weight: 99.2 kg (218 lb 11.1 oz) (01/12/17 0652)    Vital Signs Range (Last 24H):  Temp:  [97.9 °F (36.6 °C)-99.2 °F (37.3 °C)]   Pulse:  [109-137]   Resp:  [15-19]   BP: ()/(39-59)   SpO2:  [93 %-96 %]     Physical Exam:  Constitutional: NAD, conversant  HEENT:  PERRLA, EOMI  Neck: JVD to the jaw: supple, trach midline  CV: RRR, no m/r/g, normal S1/S2  Pulm: few crackles in the bases  GI: Abdomen soft, NTND, +BS  Extremities: Trace BLE edema, warm    Laboratory:  CBC:   Recent Labs  Lab 01/12/17  0537   WBC 11.21   RBC 5.43*   HGB 13.0   HCT 41.1      MCV 76*   MCH 23.9*   MCHC 31.6*     BMP:   Recent Labs  Lab 01/12/17  0536 01/12/17  1030   * 173*    136   K 4.0 4.2   CL 99 98   CO2 29 29   BUN 17 16   CREATININE 1.1 1.2   CALCIUM 9.0 9.0   MG 1.9  --        Diagnostic Results:  2D echo:  1 - Severely depressed left ventricular systolic function (EF 15-20%).     2 - Normal left ventricular diastolic function.     3 - Right ventricular enlargement with moderately depressed systolic function.     4 - Biatrial enlargement.     5 - Pulmonary hypertension. The estimated PA systolic pressure is 45 mmHg.     6 - Moderate mitral regurgitation.     7 - Moderate  tricuspid regurgitation.     8 - Increased central venous pressure.     9 - There is thrombus in the cardiac apex.   LVEDD 5.4  TAPSE 1.3    ASSESSMENT/PLAN:     61 year old female with NICM presents to [facility] for advanced options.  He is not dilated and her RV is reportedly moderately depressed with a thrombus in the apex.  Her work up is still ongoing.  Needs a chest CT.  Repeat RHC is planned today.  Dr. Marinelli will staff.  We will follow with you.

## 2017-01-12 NOTE — PT/OT/SLP PROGRESS
Physical Therapy      Vaishnavi Baca  MRN: [id number]    Patient not seen today secondary to Unavailable ([facility]). Will follow-up next scheduled visit.    Mckenna Walsh, PTA

## 2017-01-12 NOTE — PROGRESS NOTES
Patient complaining of restrosternal chest pain. She received tums by the RN with some improvement in her symptoms. EKG with lateral ST depressions that is unchanged from previous. Per chart patient has history of LHC that revealed no CAD.   Imression:  -Likely non cardiac GI pain.  -Will try mylanta.   -will send trops.

## 2017-01-12 NOTE — PROGRESS NOTES
Pt BP coming up 1 hour post fluid bolus BP now 90/54 MAP 66; HR continues to stay around 120bpm. Will continue to monitor.

## 2017-01-12 NOTE — INTERVAL H&P NOTE
Cardiac Cath Lab History and Physical  - there has been no significant interval change since this H&P visit.      History of Present Illness:  61 y.o. with NICM (15%) admitted for ADHF presents to cath lab for assessment of hemodynamics to determine need for home inotropes. She has no new complaints.   -->Was originally supposed to perform RHC 1/10 off . However, just prior to procedure, she had a hypotensive episode and a fall in her room. She was placed back on inotropes for safety the procedure was postponed. She was hypotensive again yesterday and the procedure was again postponed until today. She has no new complaints.      Assessment/Plan:  NICMP admitted with ADHF  - I have explained the risks, benefits, and alternatives of the procedure in detail. The patient expresses understanding and all questions have been answered. The patient agrees to the proceed as planned.  - proceed with RHC via RIJ  - Micropuncture access needle will be used to minimize bleeding risk.     GARETT Hansen MD  [facility] Cardiology Fellow

## 2017-01-12 NOTE — PROGRESS NOTES
Ms. Baca has been successfully transitioned to the telemetry floor. I have previously introduced myself to Ms. Baca and provided explanation to my role and that of the heart failure and transplant team.  This done during her time in the Coronary Care Intensive Care Unit.  At the request of the in hospital heart failure and heart transplant team, Ms Baca is seen at her bedside. She has previously been provided written educational material on heart failure and heart transplantation.    She endorses the ability to both read and write the written word.  The following verbal educational information is provided but not limited too:  That she is in safe hands.  That every question is a good question and needs an answer.  That heart transplantation is the last choice never the first.  That heart transplantation is a treatment and not a cure.  That there is no implied nor given guarantee of success.    PRE-EDUCATION BOOKLET NOTE:    Met with Vaishnavi Baca and had a brief discussion on the heart transplant evaluation process.    Heart Transplant Educational Booklet given to patient, which included the following handouts:  · Cardiomyopathy and Heart Transplantation  · Pre-transplant Evaluation Process  · Ventricular Assist Devices  · Wellness Contract  · Heart Transplant Information Outline  · Recipient Informed Consent  · Discharge Instructions for Patients with Heart Failure  · Advanced Directives  · Suggested web sites  · Multiple listing protocol and UNOS toll free numbers     EDUCATION NOTE:    Vaishnavi Baca was seen today for pre-heart transplant education.  Patient signed  informed consent to undergo heart transplant evaluation work-up.  Thorough pre-transplant education conducted.    Information presented included:  · Evaluation process with particulars  · Members of the transplant team there role and function and I as his heart transplant coordinator.  · Selection committee members and role of the committee  with particulars  · Listing process for transplant with particulars  · Different listing designations 1A, 1B,2, 7 and exception with qualifiers to each with particulars.  · 1-year graft survival statistics both here at [facility] and using the June SRTR data.  · LVAD as bridge to transplant or a destination therapy  · Need to reach patient within 15 minutes of donor offer and have him en route to [facility].  · [facility] Service high risk donors with particulars.  · Potential for blood and blood product transfusion.  · Process for matching donor with recipient with particulars.  · Need for weight loss and how it relates to the wait time  · Post-transplant immunosuppression for life with need to be able to afford post-transplant medications.  · The names of these medication with expected, untoward and side effects.   · These medications on time every time.  · Commitment to compliance and consequences of nonadherent actions.  · The heart transplant operation with particulars and in detail.  · Post operative ICU stay with sedation, intubation, mechanical ventilation,lines,dressing, catheters, tubes, drains, monitoring device, medication infusion devices, pain control and comfort.   · Need for a caregiver to be with them at all times beginning with discharge from ICU, through at least the first 6 weeks post-transplant.  · TSU stay with particulars to nurse observed, patient administered post heart transplantation self medication.  · Cardiac rejection with particulars to S/S diagnosis, echocardiography, endomyocardial biopsy with potential treatment options  · Need to find local housing for the first 6 weeks post-transplant  · How to reach team members at any time  · Ability to be placed on as many heart transplant lists that will list you. This is called multiple listings.  · Contact information to UNOS, and federal reporting for those concerns you feels we have not addressed to your satisfaction  · UNOS website with  written instructions regarding how to look up information specific to [facility]'s transplant program  · Additionally I have reviewed line by line and page by page his consent for transplantation evaluation.       She was alone. All current questions are answered to satisfaction as evidenced by verbal acknowledgement.

## 2017-01-12 NOTE — PLAN OF CARE
Problem: Patient Care Overview  Goal: Plan of Care Review  Outcome: Ongoing (interventions implemented as appropriate)  Pt free of falls or injury during the shift. General skin remains CDI. N/V controlled with zofran and phenergan. Pt to be scheduled for RHC tomorrow morning. Tolerating plan of care

## 2017-01-12 NOTE — PROGRESS NOTES
Notified MD ([facility] [place] on call Dr. Prather) of patient status. Patient complained of indigestion that radiated to her chest causing a crushing feeling in chest. 6 out 10 on pain scale. Pt stated she never had this feeling before. Alexys was given initially to relieve indigestion, Vital signs were checked (BP 97/54 O2 sat 95% on room air, ). MD acknowledged findings. Orders given: Stat EKG, call with results. Will continue to monitor.

## 2017-01-12 NOTE — PROGRESS NOTES
Progress Note  Heart Transplant Service    Admit Date: 1/4/2017   LOS: 8 days     SUBJECTIVE:     Follow up for: Acute decompensated heart failure    Interval History: Patient had chest pain overnight, resolved with GI cocktail. No CP this am. RHC today.    Scheduled Meds:   pantoprazole  40 mg Oral Daily    senna-docusate 8.6-50 mg  1 tablet Oral BID    sodium chloride 0.9%  10 mL Intravenous Q6H    sodium chloride 0.9%  3 mL Intravenous Q8H    trazodone  50 mg Oral QHS     Continuous Infusions:   DOBUTamine 2.5 mcg/kg/min (01/11/17 1202)     PRN Meds:aluminum & magnesium hydroxide-simethicone, calcium carbonate, ondansetron, promethazine (PHENERGAN) IVPB, Flushing PICC Protocol **AND** sodium chloride 0.9% **AND** sodium chloride 0.9%    Review of patient's allergies indicates:  No Known Allergies    OBJECTIVE:     Vital Signs (Most Recent)  Temp: 98.8 °F (37.1 °C) (01/12/17 0400)  Pulse: (!) 131 (01/12/17 0500)  Resp: 18 (01/12/17 0400)  BP: (!) 100/59 (01/12/17 0400)  SpO2: (!) 93 % (01/12/17 0400)    Vital Signs Range (Last 24H):  Temp:  [97.7 °F (36.5 °C)-99.2 °F (37.3 °C)]   Pulse:  [109-137]   Resp:  [17-19]   BP: ()/(52-63)   SpO2:  [93 %-96 %]     I & O (Last 24H):  Intake/Output Summary (Last 24 hours) at 01/12/17 0650  Last data filed at 01/11/17 2230   Gross per 24 hour   Intake           979.53 ml   Output              800 ml   Net           179.53 ml            Telemetry: Sinus tachycardia    Physical Exam   Constitutional: She is oriented to person, place, and time. She appears well-developed and well-nourished.   HENT:   Head: Normocephalic and atraumatic.   Neck: Normal range of motion. Neck supple. No JVD (at level of clavicle) present.   Cardiovascular: Normal rate and regular rhythm.  Exam reveals gallop.    Pulmonary/Chest: Effort normal and breath sounds normal. No respiratory distress. She has no wheezes. She has no rales.   Abdominal: Soft. Bowel sounds are normal. She exhibits  no distension. There is no tenderness.   Musculoskeletal: Normal range of motion. She exhibits no edema.   Neurological: She is alert and oriented to person, place, and time.   Skin: Skin is warm.   Nursing note and vitals reviewed.      Labs:       Recent Labs  Lab 01/09/17  0442 01/10/17  0435 01/11/17  0507   WBC 14.20* 13.72* 13.80*   HGB 13.8 13.5 13.4   HCT 43.7 42.1 41.9    232 227   LYMPH 24.5  3.5 23.4  3.2 25.1  3.5   MONO 9.0  1.3* 8.7  1.2* 8.2  1.1*   EOSINOPHIL 2.2 2.0 1.9         Recent Labs  Lab 01/05/17  1433  01/09/17  0442 01/10/17  0435 01/11/17  0507 01/11/17  1636   APTT 26.1  --   --   --   --  105.8*   INR 1.7*  < > 1.2 1.1 1.1  --    < > = values in this interval not displayed.     Recent Labs  Lab 01/09/17  0442  01/10/17  0435 01/11/17  0507 01/11/17  1636   *  127*  --  160*  160* 108  108  --    CALCIUM 8.9  8.9  --  9.1  9.1 9.1  9.1  --    ALBUMIN 3.0*  --  3.0* 3.0*  --    PROT 6.7  --  6.8 6.9  --      137  --  134*  134* 135*  135*  --    K 3.6  3.6  --  3.7  3.7 3.6  3.6  --    CO2 36*  36*  --  33*  33* 29  29  --    CL 91*  91*  --  92*  92* 95  95  --    BUN 22  22  --  21  21 25*  25*  --    CREATININE 1.0  1.0  --  1.2  1.2 1.3  1.3  --    ALKPHOS 52*  --  54* 53*  --    ALT 30  --  23 28  --    AST 20  --  18 27  --    BILITOT 1.3*  --  1.1* 1.0  --    MG 1.7  1.7  < > 1.8 2.0 1.8   PHOS 3.3  --  3.2 3.7  --    < > = values in this interval not displayed.  Estimated Creatinine Clearance: 58.8 mL/min (based on Cr of 1.3).      Recent Labs  Lab 01/10/17  0435   *       No results for input(s): LDH in the last 168 hours.    Microbiology Results (last 7 days)     Procedure Component Value Units Date/Time    Blood culture [[id number]] Collected:  01/11/17 2330    Order Status:  Sent Specimen:  Blood Updated:  01/12/17 0023    Blood culture [[id number]] Collected:  01/05/17 0035    Order Status:  Completed Specimen:  Blood  from Line, Jugular, Internal Right Updated:  01/10/17 0612     Blood Culture, Routine No growth after 5 days.    Blood culture [[id number]] Collected:  01/05/17 0054    Order Status:  Completed Specimen:  Blood from Peripheral, Wrist, Left Updated:  01/10/17 0612     Blood Culture, Routine No growth after 5 days.          2D Echo 1/5/17    1 - Severely depressed left ventricular systolic function (EF 15-20%). LVEDD 5.4 cm    2 - Normal left ventricular diastolic function.     3 - Right ventricular enlargement with moderately depressed systolic function. TAPSE 1.3    4 - Biatrial enlargement.     5 - Pulmonary hypertension. The estimated PA systolic pressure is 45 mmHg.     6 - Moderate mitral regurgitation.     7 - Moderate tricuspid regurgitation.     8 - Increased central venous pressure.     9 - There is thrombus in the cardiac apex.     ASSESSMENT:     62 y/o with no PMHx, recently diagnosed with heart failure, [facility] with no evidence of CAD, being transferred from [facility] in [place] for advanced heart failure consideration.    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 Given hypotension issues, and overall picture, suspect she will need VAD/OHT eval completed prior to d/c  -Continue education for VAD & OHTx, awaiting financial clearance  -2 g Na dietary restriction, 1500 mL fluid restriction, strict I/Os    Chest Pain  -CP overnight, per patient was relieved with GI cocktail  -In cath holding area today patient with another episode of chest pain, central and crushing. EKG unchanged from overnight, enzymes negative. Will trend enzymes today  -1 NTG given with some relief episode this am  -RHC cancelled due to CP     LV Apical Thrombus  -Heparin gtt held after midnight for RHC  -Resumed given CP while off today for RHC.      Louise Monroy PA-C  HT[facility]  [facility] #[id number]

## 2017-01-12 NOTE — PLAN OF CARE
Problem: Patient Care Overview  Goal: Plan of Care Review  Outcome: Ongoing (interventions implemented as appropriate)  Pt remained fall and injury free throughout the shift. Generalized skin remains CDI. Dobutamine gtt infused at 2.5 mck/kg (8.2 mL/hr) and heparin gtt was restarted today at 17 units/kg (13.9mL/hr).  Pt went for RHC today; in the procedural area pt started experiencing CP and NTG was given SL. BP started dropping lower than team wanted for procedure. RHC rescheduled for tomorrow. Pt tolerating plan of care.

## 2017-01-12 NOTE — PLAN OF CARE
Problem: Patient Care Overview  Goal: Plan of Care Review  Outcome: Ongoing (interventions implemented as appropriate)  Plan of care discussed with pt, no new questions at this time. VSS, denies SOB, complaint of chest pain. EKG obtained and cardiac markers. All of which were unremarkable. Possible indigestion, tums and mylanta adminstered. Patient symptoms were relieved.  Dobutamine and heparin gtt infusing. No falls/trauma overnight. Patient scheduled for RHC in am. Will continue to monitor.

## 2017-01-13 NOTE — PT/OT/SLP PROGRESS
Physical Therapy      Vaishnavi Baca  MRN: [id number]    Patient not seen today secondary to Unavailable ([facility]). Will follow-up next scheduled visit.    Mckenna Walsh, PTA

## 2017-01-13 NOTE — PROGRESS NOTES
Progress Note  Heart Transplant Service    Admit Date: 1/4/2017   LOS: 9 days     SUBJECTIVE:     Follow up for: Acute decompensated heart failure    Interval History: Patient going for RHC today. No complaints.    Scheduled Meds:   heparin (PORCINE)  70 Units/kg (Order-Specific) Intravenous Once    pantoprazole  40 mg Oral Daily    senna-docusate 8.6-50 mg  1 tablet Oral BID    sodium chloride 0.9%  10 mL Intravenous Q6H    sodium chloride 0.9%  3 mL Intravenous Q8H    trazodone  50 mg Oral QHS     Continuous Infusions:   DOBUTamine 2.5 mcg/kg/min (01/13/17 0123)    heparin (porcine) in 5 % dex 19 Units/kg/hr (01/13/17 0543)     PRN Meds:aluminum & magnesium hydroxide-simethicone, calcium carbonate, nitroGLYCERIN, ondansetron, promethazine (PHENERGAN) IVPB, Flushing PICC Protocol **AND** sodium chloride 0.9% **AND** sodium chloride 0.9%    Review of patient's allergies indicates:  No Known Allergies    OBJECTIVE:     Vital Signs (Most Recent)  Temp: 98 °F (36.7 °C) (01/13/17 0400)  Pulse: (!) 125 (01/13/17 0500)  Resp: 18 (01/13/17 0400)  BP: (!) 90/56 (01/13/17 0400)  SpO2: 95 % (01/13/17 0400)    Vital Signs Range (Last 24H):  Temp:  [98 °F (36.7 °C)-99 °F (37.2 °C)]   Pulse:  [121-137]   Resp:  [15-19]   BP: ()/(39-70)   SpO2:  [93 %-100 %]     I & O (Last 24H):    Intake/Output Summary (Last 24 hours) at 01/13/17 0703  Last data filed at 01/13/17 0500   Gross per 24 hour   Intake              480 ml   Output             2150 ml   Net            -1670 ml            Telemetry: Sinus tachycardia    Physical Exam   Constitutional: She is oriented to person, place, and time. She appears well-developed and well-nourished.   HENT:   Head: Normocephalic and atraumatic.   Neck: Normal range of motion. Neck supple. No JVD (at level of clavicle) present.   Cardiovascular: Normal rate and regular rhythm.  Exam reveals gallop.    Pulmonary/Chest: Effort normal and breath sounds normal. No respiratory distress.  She has no wheezes. She has no rales.   Abdominal: Soft. Bowel sounds are normal. She exhibits no distension. There is no tenderness.   Musculoskeletal: Normal range of motion. She exhibits no edema.   Neurological: She is alert and oriented to person, place, and time.   Skin: Skin is warm.   Nursing note and vitals reviewed.      Labs:       Recent Labs  Lab 01/11/17  0507 01/12/17  0537 01/13/17  0349   WBC 13.80* 11.21 9.35   HGB 13.4 13.0 12.6   HCT 41.9 41.1 40.6    226 213   LYMPH 25.1  3.5 25.6  2.9 28.0  2.6   MONO 8.2  1.1* 9.0  1.0 9.5  0.9   EOSINOPHIL 1.9 1.6 2.2         Recent Labs  Lab 01/11/17  0507 01/11/17  1636 01/12/17  0537 01/13/17  0349   APTT  --  105.8*  --   --    INR 1.1  --  1.0 1.1        Recent Labs  Lab 01/11/17  0507  01/12/17  0536 01/12/17  1030 01/12/17  1753 01/13/17  0349     108  --  121* 173*  --  108   CALCIUM 9.1  9.1  --  9.0 9.0  --  9.1   ALBUMIN 3.0*  --  2.9* 2.9*  --  2.8*   PROT 6.9  --  6.7 6.7  --  6.6   *  135*  --  138 136  --  139   K 3.6  3.6  --  4.0 4.2  --  4.3   CO2 29  29  --  29 29  --  29   CL 95  95  --  99 98  --  101   BUN 25*  25*  --  17 16  --  14   CREATININE 1.3  1.3  --  1.1 1.2  --  1.0   ALKPHOS 53*  --  52* 51*  --  49*   ALT 28  --  24 27  --  27   AST 27  --  27 25  --  27   BILITOT 1.0  --  0.8 0.8  --  0.7   MG 2.0  < > 1.9  --  2.0 2.1   PHOS 3.7  --  3.2  --   --  4.0   < > = values in this interval not displayed.  Estimated Creatinine Clearance: 76.8 mL/min (based on Cr of 1).      Recent Labs  Lab 01/12/17  0537   *         Recent Labs  Lab 01/12/17  1753   *       Microbiology Results (last 7 days)     Procedure Component Value Units Date/Time    Blood culture [[id number]] Collected:  01/11/17 2330    Order Status:  Completed Specimen:  Blood Updated:  01/13/17 0613     Blood Culture, Routine No Growth to date     Blood Culture, Routine No Growth to date    Blood culture [[id number]]  Collected:  01/05/17 0035    Order Status:  Completed Specimen:  Blood from Line, Jugular, Internal Right Updated:  01/10/17 0612     Blood Culture, Routine No growth after 5 days.    Blood culture [[id number]] Collected:  01/05/17 0054    Order Status:  Completed Specimen:  Blood from Peripheral, Wrist, Left Updated:  01/10/17 0612     Blood Culture, Routine No growth after 5 days.          2D Echo 1/5/17    1 - Severely depressed left ventricular systolic function (EF 15-20%). LVEDD 5.4 cm    2 - Normal left ventricular diastolic function.     3 - Right ventricular enlargement with moderately depressed systolic function. TAPSE 1.3    4 - Biatrial enlargement.     5 - Pulmonary hypertension. The estimated PA systolic pressure is 45 mmHg.     6 - Moderate mitral regurgitation.     7 - Moderate tricuspid regurgitation.     8 - Increased central venous pressure.     9 - There is thrombus in the cardiac apex.     ASSESSMENT:     62 y/o with no PMHx, recently diagnosed with heart failure, [facility] with no evidence of CAD, being transferred from [facility] in [place] for advanced heart failure consideration.    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 Given hypotension issues, and overall picture, suspect she will need VAD/OHT eval completed prior to d/c  -Continue education for VAD & OHTx, awaiting financial clearance  -2 g Na dietary restriction, 1500 mL fluid restriction, strict I/Os    Chest Pain  -Suspect it is non-cardiac, if persists and BP allows, consider changing to primacor.   -Troponins negative, EKG without ischemic changes  -Will continue to monitor     LV Apical Thrombus  -Continue heparin  -Will start PO anticoagulant when appropriate (pending dispo plans)     Louise Monroy PA-C  [facility] [email or web address] #[id number]

## 2017-01-13 NOTE — PROGRESS NOTES
Pt AAAO, no family at bedside. Pt verbalized she has not yet read the materials provided, but she thinks her  has.  She reports she will try to get to it sometime soon.  I asked if she could read the materials tonight please so we can move to the next stage of VAD education tomorrow.  I explained the education process and the importance of completing the education.  Pt verbalized understanding and agreement.

## 2017-01-13 NOTE — TELEPHONE ENCOUNTER
Financial clearance for inpatient eval requested.   Letter of medical necessity and clinical given to .

## 2017-01-13 NOTE — LETTER
2017    RE:   Vaishnavi Baca               [birth year]           MRN   [id number]             To Whom It May Concern:    Ms. Vaishnavi Baca is a 61 y.o. year-old female patient at [facility] and was seen for consideration for cardiac transplantation.  She has a diagnosis of Non-Ischemic Cardiomyopathy who is currently hospitalized and receiving continuous IV infusion of Dobutamine.  She is impaired in her exercise tolerance with a NYHA Functional Class IV  and a Karnofsky score of 30. Severely disabled; hospitalization is indicated, death not imminent, and an ejection fraction of 15%.    We feel that her functional impairment and lack of advanced options make her quality of life poor.  We feel that her only option at this time, after thorough review of all of her findings, is cardiac transplantation.  Therefore, we request you review of this case and approval for a cardiac transplant evaluation as an inpatient.    If we can be of any further assistance, please do not hesitate to contact us at the above address.    Sincerely,        Kendal Leiva MD, [facility]  Medical Director, Pre-Transplant Program

## 2017-01-13 NOTE — PHYSICIAN QUERY
PT Name: Vaishnavi Baca  MR #: [id number]     Physician Query Form - Documentation Clarification    Reviewer  Ext Surinder Keyes RN [phone]    This form is a permanent document in the medical record.     Query Date: January 13, 2017  By submitting this query, we are merely seeking further clarification of documentation to reflect the severity of illness of your patient. Please utilize your independent clinical judgment when addressing the question(s) below.    (The Medical record reflects the following:)      Supporting Clinical Findings Location in Medical Record   Patient with evidence of significant fluid overload and cardiorenal JEREMY. Warm and wet exacerbation    Hypotensive without dobutamine Head ct negative Sounds like she will need workup       Pt BP coming up 1 hour post fluid bolus BP now 90/54 MAP 66; HR continues to stay around 120bpm    Acute on Chronic Combined Systolic and Diastolic Heart Failure    Given hypotension issues, and overall picture, suspect she will need VAD/OHT eval completed prior to d/c   1/11 Interval  H&P      1/10 Cardiology PN      1/11 Nursing PN      1/12 Heart Transplant PN    1/12 Heart Transplant PN   BP= 89/53---->82/50---->79/39---->81/45  MAP= 68---->62---->54----->60     1/11-1/12 VS Flowsheet  1/11-1/12 VS Flowsheet                                                                            Doctor, Please specify diagnosis or diagnoses associated with above clinical findings.    Physician Use Only    (   )Acute on Chronic Combined Systolic and Diastolic Heart Failure   with Cardiogenic Shock    (   X)Acute on Chronic Combined Systolic and Diastolic Heart Failure    Other____________________________________________________________                                                                                                               [  ] Unable to determine

## 2017-01-13 NOTE — BRIEF OP NOTE
Date of admit to cath lab: 1/13/2017      Date of discharge from cath lab: 1/13/2017      Principal diagnosis: CHF    Discharge attending physician: Shauna Pozo MD    Hospital Course/Outcome of the treatment, procedures or surgery: Pt admitted for RHC.   See CVIS/cath lab procedure report in EPIC  for full report of today's procedure.    Disposition of the case (d/c disposition): CSU    Discharge Medication List: see med card    Plan for follow up care, diet, activity level: F/U as scheduled. Resume low Na diet.  Activity as tolerated    Condition on discharge from Cath lab: Stable.

## 2017-01-13 NOTE — INTERVAL H&P NOTE
Pt again presents to attempt RHC (previously deferred due to hypotension and then yest pt had episode of CP)     RHC R IJ, 7 Fr sheath with local lidocaine, micropuncture kit and US guidance.    I have explained the risks, benefits and alternatives of the procedure in detail. The patient voices understanding and all questions have been answered,. The patient agrees to proceed as planned.

## 2017-01-13 NOTE — PROGRESS NOTES
Notified MD (DR. Prather) of patient status. Patient HR sustaining in the 130's, also patient complained of chest pain that was relieved by 1 nitro tablet. MD acknowledged findings. Orders given: dont give any nitro. Patient BP bottoms out. Give mylanta for indigestion that may mimic chest pain. MD didn't give any new orders for HR. Just monitor.

## 2017-01-13 NOTE — PLAN OF CARE
Problem: Patient Care Overview  Goal: Plan of Care Review  Outcome: Ongoing (interventions implemented as appropriate)  Plan of care discussed, pt verbalized understanding. VSS, denies SOB, pt still experiencing mild chest pain. Diagnostic study were unremarkable for cardiac compromise. Dobutamine and Heparin gtt infusing. No falls/trauma during shift. Plan for RHC in am. Will continue to monitor.

## 2017-01-13 NOTE — PLAN OF CARE
Problem: Patient Care Overview  Goal: Plan of Care Review  Outcome: Ongoing (interventions implemented as appropriate)  Pt remained fall and injury free throughout the shift. Pt had diagnostic RHC done today

## 2017-01-14 NOTE — PLAN OF CARE
Problem: Patient Care Overview  Goal: Plan of Care Review  Outcome: Ongoing (interventions implemented as appropriate)  Plan of care discussed with patient, no new questions at this time. VSS, denies SOB, denies pain. Dobutamine/heparin gtt infusing. Labs being monitored. Safety precautions taken, no falls/trauma during the shift. Will continue to monitor.

## 2017-01-14 NOTE — PROGRESS NOTES
Progress Note  Heart Transplant Service    Admit Date: 1/4/2017   LOS: 10 days     SUBJECTIVE:     Follow up for: Acute decompensated heart failure    Interval History: Patient reporting chest pain this am, relieved with tums. Otherwise no new complaints.    Scheduled Meds:   heparin (PORCINE)  70 Units/kg (Order-Specific) Intravenous Once    pantoprazole  40 mg Oral Daily    senna-docusate 8.6-50 mg  1 tablet Oral BID    sodium chloride 0.9%  10 mL Intravenous Q6H    sodium chloride 0.9%  3 mL Intravenous Q8H    trazodone  50 mg Oral QHS     Continuous Infusions:   DOBUTamine 2.5 mcg/kg/min (01/13/17 0123)    heparin (porcine) in 5 % dex 19 Units/kg/hr (01/13/17 0543)     PRN Meds:aluminum & magnesium hydroxide-simethicone, calcium carbonate, nitroGLYCERIN, ondansetron, promethazine (PHENERGAN) IVPB, Flushing PICC Protocol **AND** sodium chloride 0.9% **AND** sodium chloride 0.9%    Review of patient's allergies indicates:  No Known Allergies    OBJECTIVE:     Vital Signs (Most Recent)  Temp: 98.2 °F (36.8 °C) (01/14/17 0356)  Pulse: (!) 136 (01/14/17 0500)  Resp: 18 (01/14/17 0356)  BP: (!) 102/54 (01/14/17 0356)  SpO2: 95 % (01/14/17 0356)    Vital Signs Range (Last 24H):  Temp:  [97.9 °F (36.6 °C)-98.4 °F (36.9 °C)]   Pulse:  [109-140]   Resp:  [16-18]   BP: ()/(50-72)   SpO2:  [95 %-96 %]     I & O (Last 24H):    Intake/Output Summary (Last 24 hours) at 01/14/17 0610  Last data filed at 01/14/17 0410   Gross per 24 hour   Intake          1632.68 ml   Output             1400 ml   Net           232.68 ml            Telemetry: Sinus tachycardia 130 bpm    Physical Exam   Constitutional: She is oriented to person, place, and time. She appears well-developed and well-nourished.   HENT:   Head: Normocephalic and atraumatic.   Neck: Normal range of motion. Neck supple. No JVD (at level of clavicle) present.   Cardiovascular: Normal rate and regular rhythm.  Exam reveals gallop.    Pulmonary/Chest: Effort  normal and breath sounds normal. No respiratory distress. She has no wheezes. She has no rales.   Abdominal: Soft. Bowel sounds are normal. She exhibits no distension. There is no tenderness.   Musculoskeletal: Normal range of motion. She exhibits no edema.   Neurological: She is alert and oriented to person, place, and time.   Skin: Skin is warm.   Nursing note and vitals reviewed.      Labs:       Recent Labs  Lab 01/11/17  0507 01/12/17  0537 01/13/17  0349   WBC 13.80* 11.21 9.35   HGB 13.4 13.0 12.6   HCT 41.9 41.1 40.6    226 213   LYMPH 25.1  3.5 25.6  2.9 28.0  2.6   MONO 8.2  1.1* 9.0  1.0 9.5  0.9   EOSINOPHIL 1.9 1.6 2.2         Recent Labs  Lab 01/11/17  0507 01/11/17  1636 01/12/17  0537 01/13/17  0349   APTT  --  105.8*  --   --    INR 1.1  --  1.0 1.1        Recent Labs  Lab 01/11/17  0507  01/12/17  0536 01/12/17  1030 01/12/17  1753 01/13/17  0349     108  --  121* 173*  --  108   CALCIUM 9.1  9.1  --  9.0 9.0  --  9.1   ALBUMIN 3.0*  --  2.9* 2.9*  --  2.8*   PROT 6.9  --  6.7 6.7  --  6.6   *  135*  --  138 136  --  139   K 3.6  3.6  --  4.0 4.2  --  4.3   CO2 29  29  --  29 29  --  29   CL 95  95  --  99 98  --  101   BUN 25*  25*  --  17 16  --  14   CREATININE 1.3  1.3  --  1.1 1.2  --  1.0   ALKPHOS 53*  --  52* 51*  --  49*   ALT 28  --  24 27  --  27   AST 27  --  27 25  --  27   BILITOT 1.0  --  0.8 0.8  --  0.7   MG 2.0  < > 1.9  --  2.0 2.1   PHOS 3.7  --  3.2  --   --  4.0   < > = values in this interval not displayed.  Estimated Creatinine Clearance: 77 mL/min (based on Cr of 1).      Recent Labs  Lab 01/12/17  0537   *         Recent Labs  Lab 01/12/17  1753   *       Microbiology Results (last 7 days)     Procedure Component Value Units Date/Time    Blood culture [[id number]] Collected:  01/11/17 2330    Order Status:  Completed Specimen:  Blood Updated:  01/13/17 0613     Blood Culture, Routine No Growth to date     Blood Culture,  Routine No Growth to date    Blood culture [[id number]] Collected:  01/05/17 0035    Order Status:  Completed Specimen:  Blood from Line, Jugular, Internal Right Updated:  01/10/17 0612     Blood Culture, Routine No growth after 5 days.    Blood culture [[id number]] Collected:  01/05/17 0054    Order Status:  Completed Specimen:  Blood from Peripheral, Wrist, Left Updated:  01/10/17 0612     Blood Culture, Routine No growth after 5 days.          2D Echo 1/5/17    1 - Severely depressed left ventricular systolic function (EF 15-20%). LVEDD 5.4 cm    2 - Normal left ventricular diastolic function.     3 - Right ventricular enlargement with moderately depressed systolic function. TAPSE 1.3    4 - Biatrial enlargement.     5 - Pulmonary hypertension. The estimated PA systolic pressure is 45 mmHg.     6 - Moderate mitral regurgitation.     7 - Moderate tricuspid regurgitation.     8 - Increased central venous pressure.     9 - There is thrombus in the cardiac apex.     ASSESSMENT:     60 y/o with no PMHx, recently diagnosed with heart failure, [facility] with no evidence of CAD, being transferred from [facility] in [place] for advanced heart failure consideration.    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 No GDMT at this time 2/2 tenuous BP  -Continue education & workup for VAD & OHTx  -Renal ultrasound ordered given CT findings   -2 g Na dietary restriction, 1500 mL fluid restriction, strict I/Os    Chest Pain  -Suspect it is non-cardiac, if persists and BP allows, consider changing to primacor.   -Occurs at same time every day  -Troponins negative, EKG without ischemic changes  -Will continue to monitor     LV Apical Thrombus  -Continue heparin  -Will start PO anticoagulant when appropriate (pending dispo plans)     Louise Monroy PA-C  [facility] #[id number]

## 2017-01-14 NOTE — PT/OT/SLP PROGRESS
"Physical Therapy  Treatment    Vaishnavi Baca   MRN: [id number]   Admitting Diagnosis: Acute decompensated heart failure    PT Received On: 17  PT Start Time: 1320     PT Stop Time: 1358    PT Total Time (min): 38 min       Billable Minutes:  Gait [id number], Therapeutic Activity 15 and Therapeutic Exercise 13    Treatment Type: Treatment  PT/PTA: PTA     PTA Visit Number: 1       General Precautions: Standard, fall  Orthopedic Precautions: N/A     Do you have any cultural, spiritual, Jain conflicts, given your current situation?: None    Subjective:  Communicated with RN prior to session.  Pt agreeable to PT.  "I want to walk around and see if I can get my strength back."    Pain Ratin/10     Pain Rating Post-Intervention: 0/10    Objective:   Patient found with: telemetry, PICC line    Functional Mobility:  Bed Mobility:   Rolling/Turning to Left: Independent  Rolling/Turning Right: Independent  Scooting/Bridging: Independent  Supine to Sit: Independent  Sit to Supine: Independent    Transfers:  Sit <> Stand Assistance: Supervision  Sit <> Stand Assistive Device: No Assistive Device  Bed <> Chair Technique: Stand Pivot  Bed <> Chair Assistance: Supervision  Bed <> Chair Assistive Device: No Assistive Device  Toilet Transfer Technique: Stand Pivot  Toilet Transfer Assistance: Supervision  Toilet Transfer Assistive Device: No Assistive Device    Gait:   Gait Distance: 180 feet  Assistance 1: Contact Guard Assistance (CGA for safety due to hypotensive)  Gait Assistive Device: No device  Gait Pattern: reciprocal  Gait Deviation(s): decreased gurinder, decreased velocity of limb motion, decreased stride length    Therapeutic Activities and Exercises:  Pt was issued a written HEP, which she was able to perform x 10 reps. In supine and 2 sets of 10 reps. In sitting.     AM-PAC 6 CLICK MOBILITY  How much help from another person does this patient currently need?   1 = Unable, Total/Dependent Assistance  2 = A " lot, Maximum/Moderate Assistance  3 = A little, Minimum/Contact Guard/Supervision  4 = None, Modified [place]/Independent    Turning over in bed (including adjusting bedclothes, sheets and blankets)?: 4  Sitting down on and standing up from a chair with arms (e.g., wheelchair, bedside commode, etc.): 4  Moving from lying on back to sitting on the side of the bed?: 4  Moving to and from a bed to a chair (including a wheelchair)?: 4  Need to walk in hospital room?: 3  Climbing 3-5 steps with a railing?: 3  Total Score: 22    AM-PAC Raw Score CMS G-Code Modifier Level of Impairment Assistance   6 % Total / Unable   7 - 9 CM 80 - 100% Maximal Assist   10 - 14 CL 60 - 80% Moderate Assist   15 - 19 CK 40 - 60% Moderate Assist   20 - 22 CJ 20 - 40% Minimal Assist   23 CI 1-20% SBA / CGA   24 CH 0% Independent/ Mod I     Patient left supine with all lines intact, call button in reach and RN notified.    Assessment:  Vaishnavi Baca is a 61 y.o. female with a medical diagnosis of Acute decompensated heart failure and presents with decreased functional mobility and impairments as listed below.    Rehab identified problem list/impairments: Rehab identified problem list/impairments: impaired endurance, weakness, impaired functional mobilty, gait instability, impaired cardiopulmonary response to activity    Rehab potential is excellent.    Activity tolerance: Good    Discharge recommendations: Discharge Facility/Level Of Care Needs: home     Barriers to discharge: Barriers to Discharge: Inaccessible home environment    Equipment recommendations: Equipment Needed After Discharge: none     GOALS:   Physical Therapy Goals        Problem: Physical Therapy Goal    Goal Priority Disciplines Outcome Goal Variances Interventions   Physical Therapy Goal     PT/OT, PT Ongoing (interventions implemented as appropriate)     Description:  Goals to be met by: 1/13/2017     Patient will increase functional independence with  mobility by performin. Supine to sit with Modified [place] - met  2. Sit to supine with Modified [place] - met  3. Sit to stand transfer with Modified [place] - not met  4. Bed to chair transfer with Modified [place] -not met  5. Gait  x 200 feet with Supervision -not met  6. Ascend/descend 5 stairs with no Handrails Stand-by Assistance - not met                 PLAN:    Patient to be seen 3 x/week  to address the above listed problems via gait training, therapeutic activities, therapeutic exercises  Plan of Care expires: 17  Plan of Care reviewed with: patient    Mckenna BACA Walsh, PTA  2017

## 2017-01-14 NOTE — PLAN OF CARE
Problem: Physical Therapy Goal  Goal: Physical Therapy Goal  Goals to be met by: 2017     Patient will increase functional independence with mobility by performin. Supine to sit with Modified [place] - met  2. Sit to supine with Modified [place] - met  3. Sit to stand transfer with Modified [place] - not met  4. Bed to chair transfer with Modified [place] -not met  5. Gait x 200 feet with Supervision -not met  6. Ascend/descend 5 stairs with no Handrails Stand-by Assistance - not met   Pt continues to progress toward goals and  Goals remain appropriate at this time.   Mckenna Walsh, PTA

## 2017-01-14 NOTE — CONSULTS
[facility]  Adult Nutrition  Consult Note    SUMMARY     Recommendations    Recommendation/Intervention:   1. Continue current diet order with oral supplement.   2. Encourage good PO intake of meals. Will monitor. Noted Alb 2.9, PAB 17, and CRP 32.3.   Goals: PO intake >75% with a high source of protein at each meal.   Nutrition Goal Status: new  Communication of RD Recs: reviewed with RN    Reason for Assessment    Reason for Assessment: nurse/nurse practitioner consult  Diagnosis: cardiac disease (LVAD/OHTx w/u)  Relevent Medical History: HF   Nutrition Discharge Planning: Pt needs Cardiac diet education prior to d/c.     Nutrition Prescription Ordered    Current Diet Order: Cardiac  Oral Nutrition Supplement: Boost Breeze TID      Nutrition Risk Screen     Nutrition Risk Screen: no indicators present    Nutrition/Diet History    Typical Food/Fluid Intake: Pt not in room during visit. Noted pt consuming % of meals most of the time.   Factors Affecting Nutritional Intake: other (see comments) (none)    Labs/Tests/Procedures/Meds    Pertinent Labs Reviewed: reviewed  Pertinent Labs Comments: Glu 191  Pertinent Medications Reviewed: reviewed  Pertinent Medications Comments: Ca carbonate    Physical Findings    Overall Physical Appearance: overweight  Oral/Mouth Cavity: WDL  Skin: intact    Anthropometrics    Height (inches): 70.98 in  Weight Method: Standard Scale  Weight (kg): 100.4 kg  Ideal Body Weight (IBW), Female: 154.9 lb  % Ideal Body Weight, Female (lb): 142.89   BMI (kg/m2): 30.88  BMI Grade: 30 - 34.9- obesity - grade I    Estimated/Assessed Needs    Weight Used For Calorie Calculations: 100.4 kg (221 lb 5.5 oz)   Height (cm): 180.3 cm  Energy Need Method: Nicollet-St Jeor (1.2 PAL: 2001kcal)  RMR (Nicollet-St. Jeor Equation): 1668.75  Weight Used For Protein Calculations: 100.4 kg (221 lb 5.5 oz)  Protein Requirements: 100g (1g/kg)  Fluid Need Method: RDA Method (or per  MD)    Malnutrition (Undernutrition) Diagnosis    % Intake of Estimated Energy Needs: 75 - 100%  % Meal Intake: 75%    Nutrition Diagnosis    Nutrition Problem: Increased nutrient needs (protein)  Etiology/Related To: physiological need  Nutrition Diagnosis Signs/Symptoms As Evidenced By: HF, LVAD/OHTx w/u  Nutrition Diagnosis Status: New    Monitor and Evaluation    Food and Nutrient Intake: energy intake, food and beverage intake  Food and Nutrient Adminstration: diet order  Anthropometric Measurements: weight, weight change  Biochemical Data, Medical Tests and Procedures: other (specify) (All labs)  Nutrition-Focused Physical Findings: overall appearance    Nutrition Risk    Level of Risk: high    Nutrition Follow-Up    RD Follow-up?: Yes

## 2017-01-14 NOTE — PROGRESS NOTES
Patient complaining of chest pain, rated 8/10, denies radiation. /62 (76), . Tums given, c/p relieved within 5 minutes. BRENDA CRONIN at bedside. EKG ordered. Will continue to monitor.

## 2017-01-14 NOTE — PLAN OF CARE
Problem: Patient Care Overview  Goal: Plan of Care Review  Outcome: Ongoing (interventions implemented as appropriate)  Patient free of falls or trauma during this shift. Patient vital signs stable throughout shift. Pt had episode of chest pain this AM; no radiation to extremities or jaw; chest pain relieved with tums. Currently on heparin at 21 units/kg/hr and  2.5 mcg/kg/min. Patient currently resting in bed and voices no concerns at this time. Will continue to monitor.

## 2017-01-15 NOTE — PROGRESS NOTES
BP - 91/55, MAP - 68, HR- 131bpm after rechecking. Pt resting in bed. NADN. Will continue to monitor.

## 2017-01-15 NOTE — PLAN OF CARE
Problem: Patient Care Overview  Goal: Plan of Care Review  Outcome: Ongoing (interventions implemented as appropriate)  Patient free of falls or trauma during this shift. Patient vital signs stable throughout shift. Has not complained of any breathing difficulties. Currently on heparin drip at 21 units/kg/hr and  drip at 2.5mcg/kg/min. Patient currently resting in bed and voices no concerns at this time. Will continue to monitor.

## 2017-01-15 NOTE — PROGRESS NOTES
HR sustaining between 145-155bpm. BP - 108/59 MAP - 78Dr. Abimael notified. STAT EKG ordered. EKG showing SVT with PVCs. Dr. Varghese notified with results. States will come to pt bedside. No further instructions given at this time. NADN, pt resting in bed. Will continue to monitor.

## 2017-01-15 NOTE — PLAN OF CARE
Problem: Patient Care Overview  Goal: Plan of Care Review  Outcome: Ongoing (interventions implemented as appropriate)  Heparin &  gttx maintained throughout shift. HR elevated to 145-155bpm after pt ambulated to toilet. VS remain stable since. Denies any CP, SOB, palpitations, or dizziness. Fall precautions maintained. Free of fall/trauma/injury. General skin remains intact with no breakdown. Plan is to continue with LVAD w/o. Plan of care reviewed and updated with patient, verbalizes understanding. Patient in no acute distress. Will continue to monitor.

## 2017-01-15 NOTE — PROGRESS NOTES
BP 88/52, MAP - 64, HR - 133bpm. Pt asymptomatic. Dr. Varghese notified. Instructed to recheck BP in 30 mins. Orders to be carried out. Will continue to monitor.

## 2017-01-16 NOTE — PROGRESS NOTES
Progress Note  Heart Transplant Service    Admit Date: 1/4/2017   LOS: 12 days     SUBJECTIVE:     Follow up for: Acute decompensated heart failure    Interval History: No CP reported overnight. Ambulated x 2 yesterday in the [facility]. No additional complaints at this time.    Scheduled Meds:   magnesium oxide  400 mg Oral BID    pantoprazole  40 mg Oral Daily    senna-docusate 8.6-50 mg  1 tablet Oral BID    sodium chloride 0.9%  10 mL Intravenous Q6H    sodium chloride 0.9%  3 mL Intravenous Q8H     Continuous Infusions:   DOBUTamine 2.5 mcg/kg/min (01/15/17 1626)    heparin (porcine) in 5 % dex 21 Units/kg/hr (01/16/17 0341)     PRN Meds:aluminum & magnesium hydroxide-simethicone, calcium carbonate, heparin (PORCINE), nitroGLYCERIN, ondansetron, promethazine (PHENERGAN) IVPB, Flushing PICC Protocol **AND** sodium chloride 0.9% **AND** sodium chloride 0.9%    Review of patient's allergies indicates:  No Known Allergies    OBJECTIVE:     Vital Signs (Most Recent)  Temp: 98 °F (36.7 °C) (01/16/17 0800)  Pulse: (!) 135 (01/16/17 1100)  Resp: 18 (01/16/17 0800)  BP: (!) 94/55 (01/16/17 0800)  SpO2: 97 % (01/16/17 0800)    Vital Signs Range (Last 24H):  Temp:  [97.8 °F (36.6 °C)-99.3 °F (37.4 °C)]   Pulse:  [111-142]   Resp:  [18]   BP: ()/(52-60)   SpO2:  [95 %-99 %]     I & O (Last 24H):    Intake/Output Summary (Last 24 hours) at 01/16/17 1130  Last data filed at 01/16/17 0458   Gross per 24 hour   Intake           1253.4 ml   Output              950 ml   Net            303.4 ml            Telemetry: Sinus tachycardia 130-135 bpm    Physical Exam   Constitutional: She is oriented to person, place, and time. She appears well-developed and well-nourished.   HENT:   Head: Normocephalic and atraumatic.   Neck: Normal range of motion. Neck supple. No JVD (at level of clavicle) present.   Cardiovascular: Regular rhythm.  Tachycardia present.  Exam reveals gallop.    Pulmonary/Chest: Effort normal and breath  sounds normal. No respiratory distress. She has no wheezes. She has no rales.   Abdominal: Soft. Bowel sounds are normal. She exhibits no distension. There is no tenderness.   Musculoskeletal: Normal range of motion. She exhibits no edema.   Neurological: She is alert and oriented to person, place, and time.   Skin: Skin is warm and dry.   Nursing note and vitals reviewed.      Labs:       Recent Labs  Lab 01/14/17  0329 01/15/17  0515 01/16/17  0303   WBC 10.16 9.62 8.80   HGB 12.1 12.3 12.1   HCT 39.1 40.2 38.7    216 205   LYMPH 21.3  2.2 26.1  2.5 32.2  2.8   MONO 7.9  0.8 7.1  0.7 8.0  0.7   EOSINOPHIL 2.0 2.1 2.2         Recent Labs  Lab 01/11/17  1636  01/14/17  0329 01/15/17  0515 01/16/17  0303   APTT 105.8*  --   --   --   --    INR  --   < > 1.0 1.0 1.0   < > = values in this interval not displayed.       Recent Labs  Lab 01/14/17  0329  01/15/17  0515 01/15/17  1749 01/16/17  0303   *  --  131*  --  110   CALCIUM 9.1  --  9.2  --  9.2   ALBUMIN 2.9*  --  3.0*  --  3.0*   PROT 6.5  --  6.8  --  6.8     --  137  --  138   K 4.7  --  5.1  --  4.7   CO2 27  --  27  --  26     --  102  --  104   BUN 14  --  14  --  13   CREATININE 1.0  --  1.1  --  1.0   ALKPHOS 52*  --  50*  --  50*   ALT 28  --  28  --  25   AST 25  --  22  --  22   BILITOT 0.6  --  0.6  --  0.6   MG 1.9  1.9  < > 2.0 1.9 1.9   PHOS 4.3  --  4.3  --  4.1   < > = values in this interval not displayed.  Estimated Creatinine Clearance: 76.8 mL/min (based on Cr of 1).      Recent Labs  Lab 01/16/17  0303   *         Recent Labs  Lab 01/12/17  1753   *       Microbiology Results (last 7 days)     Procedure Component Value Units Date/Time    Blood culture [[id number]] Collected:  01/11/17 2330    Order Status:  Completed Specimen:  Blood Updated:  01/16/17 0612     Blood Culture, Routine No Growth to date     Blood Culture, Routine No Growth to date     Blood Culture, Routine No Growth to date      Blood Culture, Routine No Growth to date     Blood Culture, Routine No Growth to date    Blood culture [[id number]] Collected:  01/05/17 0035    Order Status:  Completed Specimen:  Blood from Line, Jugular, Internal Right Updated:  01/10/17 0612     Blood Culture, Routine No growth after 5 days.    Blood culture [[id number]] Collected:  01/05/17 0054    Order Status:  Completed Specimen:  Blood from Peripheral, Wrist, Left Updated:  01/10/17 0612     Blood Culture, Routine No growth after 5 days.          2D Echo 1/5/17    1 - Severely depressed left ventricular systolic function (EF 15-20%). LVEDD 5.4 cm    2 - Normal left ventricular diastolic function.     3 - Right ventricular enlargement with moderately depressed systolic function. TAPSE 1.3    4 - Biatrial enlargement.     5 - Pulmonary hypertension. The estimated PA systolic pressure is 45 mmHg.     6 - Moderate mitral regurgitation.     7 - Moderate tricuspid regurgitation.     8 - Increased central venous pressure.     9 - There is thrombus in the cardiac apex.     2D Echo 1/14/17    1 - Eccentric hypertrophy.     2 - Severely depressed left ventricular systolic function (EF 10-15%).     3 - Biatrial enlargement.     4 - Right ventricular enlargement with severely depressed systolic function.     5 - The estimated PA systolic pressure is 22 mmHg.     6 - Mild mitral regurgitation.     7 - Mild tricuspid regurgitation.     8 - Low central venous pressure.     9 - There is thrombus in the cardiac apex.     ASSESSMENT:     62 y/o with no PMHx, recently diagnosed with heart failure, [facility] with no evidence of CAD, being transferred from [facility] in [place] for advanced heart failure consideration.    PLAN:     Acute on Chronic Combined Systolic and Diastolic Heart Failure, Diagnosed 11/2016 LVEDD 5.6 cm  -NICM, NYHA IV, EF 15-20%  -GDMT d/c'ed 2/2 hypotension  - 2.5 mcg/kg/min, PICC line placed. Unable to titrate GDMT secondary to  hypotension  -Continue education & await financial clearance for VAD & OHTx  -2 g Na dietary restriction, 1500 mL fluid restriction, strict I/Os    Chest Pain, resolved  -Suspect it is non-cardiac, if persists and BP allows, can consider changing to primacor.   -Troponins negative, EKG without ischemic changes  -Will continue to monitor     LV Apical Thrombus  -Continue heparin  -Will start PO anticoagulant when appropriate (pending dispo plans)  -Present on repeat 2D echo 1/14/17    Louise Monroy PA-C  [facility] [place] [employer] #[id number]

## 2017-01-16 NOTE — PLAN OF CARE
Problem: Patient Care Overview  Goal: Plan of Care Review  Outcome: Ongoing (interventions implemented as appropriate)  Heparin &  gttx maintained throughout shift. VSS. Denies any CP, SOB, palpitations, or dizziness. Fall precautions maintained. Free of fall/trauma/injury. General skin remains intact with no breakdown. Plan is to continue with LVAD w/o once pt is financially cleared. Plan of care reviewed and updated with patient, verbalizes understanding. Patient in no acute distress. Will continue to monitor.

## 2017-01-16 NOTE — PLAN OF CARE
Problem: Patient Care Overview  Goal: Plan of Care Review  Outcome: Ongoing (interventions implemented as appropriate)  Patient free of falls or trauma during this shift. Patient vital signs stable throughout shift. Has not complained of any breathing difficulties. Currently on  at 2.5mcg/kg/min and heparin at 21units/kg/hr. Patient currently resting in bed and voices no concerns at this time. Will continue to monitor.

## 2017-01-16 NOTE — PROGRESS NOTES
Progress Note  Heart Transplant Service    Admit Date: 1/4/2017   LOS: 11 days     SUBJECTIVE:     Follow up for: Acute decompensated heart failure    Interval History: No chest pain overnight. Patient planning to read her LVAD folders today. Questions answered. Patient looking forward to ambulating today.    Scheduled Meds:   pantoprazole  40 mg Oral Daily    senna-docusate 8.6-50 mg  1 tablet Oral BID    sodium chloride 0.9%  10 mL Intravenous Q6H    sodium chloride 0.9%  3 mL Intravenous Q8H     Continuous Infusions:   DOBUTamine 2.5 mcg/kg/min (01/15/17 1626)    heparin (porcine) in 5 % dex 21 Units/kg/hr (01/15/17 1153)     PRN Meds:aluminum & magnesium hydroxide-simethicone, calcium carbonate, heparin (PORCINE), nitroGLYCERIN, ondansetron, promethazine (PHENERGAN) IVPB, Flushing PICC Protocol **AND** sodium chloride 0.9% **AND** sodium chloride 0.9%    Review of patient's allergies indicates:  No Known Allergies    OBJECTIVE:     Vital Signs (Most Recent)  Temp: 98 °F (36.7 °C) (01/15/17 1600)  Pulse: (!) 120 (01/15/17 1700)  Resp: 18 (01/15/17 1600)  BP: 100/60 (01/15/17 1600)  SpO2: 97 % (01/15/17 1600)    Vital Signs Range (Last 24H):  Temp:  [96.9 °F (36.1 °C)-98.9 °F (37.2 °C)]   Pulse:  [111-146]   Resp:  [18]   BP: ()/(45-65)   SpO2:  [96 %-98 %]     I & O (Last 24H):    Intake/Output Summary (Last 24 hours) at 01/15/17 1814  Last data filed at 01/15/17 1500   Gross per 24 hour   Intake          1633.11 ml   Output             1700 ml   Net           -66.89 ml            Telemetry: Sinus tachycardia 130 bpm    Physical Exam   Constitutional: She is oriented to person, place, and time. She appears well-developed and well-nourished.   HENT:   Head: Normocephalic and atraumatic.   Neck: Normal range of motion. Neck supple. No JVD (at level of clavicle) present.   Cardiovascular: Regular rhythm.  Tachycardia present.  Exam reveals gallop.    Pulmonary/Chest: Effort normal and breath sounds  normal. No respiratory distress. She has no wheezes. She has no rales.   Abdominal: Soft. Bowel sounds are normal. She exhibits no distension. There is no tenderness.   Musculoskeletal: Normal range of motion. She exhibits no edema.   Neurological: She is alert and oriented to person, place, and time.   Skin: Skin is warm.   Nursing note and vitals reviewed.      Labs:       Recent Labs  Lab 01/13/17 0349 01/14/17 0329 01/15/17  0515   WBC 9.35 10.16 9.62   HGB 12.6 12.1 12.3   HCT 40.6 39.1 40.2    209 216   LYMPH 28.0  2.6 21.3  2.2 26.1  2.5   MONO 9.5  0.9 7.9  0.8 7.1  0.7   EOSINOPHIL 2.2 2.0 2.1         Recent Labs  Lab 01/11/17  1636  01/13/17  0349 01/14/17 0329 01/15/17  0515   APTT 105.8*  --   --   --   --    INR  --   < > 1.1 1.0 1.0   < > = values in this interval not displayed.       Recent Labs  Lab 01/13/17  0349 01/14/17 0329 01/14/17  1804 01/15/17  0515 01/15/17  1749    191*  --  131*  --    CALCIUM 9.1 9.1  --  9.2  --    ALBUMIN 2.8* 2.9*  --  3.0*  --    PROT 6.6 6.5  --  6.8  --     137  --  137  --    K 4.3 4.7  --  5.1  --    CO2 29 27  --  27  --     101  --  102  --    BUN 14 14  --  14  --    CREATININE 1.0 1.0  --  1.1  --    ALKPHOS 49* 52*  --  50*  --    ALT 27 28  --  28  --    AST 27 25  --  22  --    BILITOT 0.7 0.6  --  0.6  --    MG 2.1 1.9  1.9 1.9 2.0 1.9   PHOS 4.0 4.3  --  4.3  --      Estimated Creatinine Clearance: 69.9 mL/min (based on Cr of 1.1).      Recent Labs  Lab 01/12/17  0537   *         Recent Labs  Lab 01/12/17  1753   *       Microbiology Results (last 7 days)     Procedure Component Value Units Date/Time    Blood culture [[id number]] Collected:  01/11/17 2330    Order Status:  Completed Specimen:  Blood Updated:  01/15/17 0612     Blood Culture, Routine No Growth to date     Blood Culture, Routine No Growth to date     Blood Culture, Routine No Growth to date     Blood Culture, Routine No Growth to date     Blood culture [[id number]] Collected:  01/05/17 0035    Order Status:  Completed Specimen:  Blood from Line, Jugular, Internal Right Updated:  01/10/17 0612     Blood Culture, Routine No growth after 5 days.    Blood culture [[id number]] Collected:  01/05/17 0054    Order Status:  Completed Specimen:  Blood from Peripheral, Wrist, Left Updated:  01/10/17 0612     Blood Culture, Routine No growth after 5 days.          2D Echo 1/5/17    1 - Severely depressed left ventricular systolic function (EF 15-20%). LVEDD 5.4 cm    2 - Normal left ventricular diastolic function.     3 - Right ventricular enlargement with moderately depressed systolic function. TAPSE 1.3    4 - Biatrial enlargement.     5 - Pulmonary hypertension. The estimated PA systolic pressure is 45 mmHg.     6 - Moderate mitral regurgitation.     7 - Moderate tricuspid regurgitation.     8 - Increased central venous pressure.     9 - There is thrombus in the cardiac apex.     ASSESSMENT:     60 y/o with no PMHx, recently diagnosed with heart failure, [facility] with no evidence of CAD, being transferred from [facility] in [place] for advanced heart failure consideration.    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 No GDMT at this time 2/2 tenuous BP  -Continue education & workup for VAD & OHTx  -Renal ultrasound ordered given CT findings  -2 g Na dietary restriction, 1500 mL fluid restriction, strict I/Os    Chest Pain  -Suspect it is non-cardiac, if persists and BP allows, can consider changing to primacor.   -Troponins negative, EKG without ischemic changes  -Will continue to monitor     LV Apical Thrombus  -Continue heparin  -Will start PO anticoagulant when appropriate (pending dispo plans)     Louise Monroy PA-C  [employer] [facility] [employer] #[id number]

## 2017-01-16 NOTE — PT/OT/SLP PROGRESS
Physical Therapy      Vaishnavi Baca  MRN: [id number]    Patient not seen today secondary to pt not hemodynamically stable enough for PT intervention, per RN. Will follow-up at a later date.    Alexander Emerson, PT

## 2017-01-17 NOTE — PLAN OF CARE
Problem: Patient Care Overview  Goal: Plan of Care Review  Outcome: Ongoing (interventions implemented as appropriate)  Patient free of falls or trauma during this shift. Patient vital signs stable throughout shift. Has not complained of any breathing difficulties. Currently on  at 2.5mcg/kg/min and heparin drip at 21units/kg/hr. Patient currently resting in bed and voices no concerns at this time. Will continue to monitor.

## 2017-01-17 NOTE — PROGRESS NOTES
Pt ROBERT, no family at bedside.  Talked with her briefly about VAD educations forms from last week.  She reports she read everything, but her  has not yet.  She said he is on his way back today and she will be sure he reads the materials.   She has no questions at this time.

## 2017-01-17 NOTE — PLAN OF CARE
Problem: Patient Care Overview  Goal: Plan of Care Review  Outcome: Ongoing (interventions implemented as appropriate)  No significant events this shift. Patient free from falls and injury. Patient complained of itching that increased throughout shift despite PO benadryl. Heparin htt and  gtt maintained per MD orders. VSS. Will continue to monitor.

## 2017-01-17 NOTE — PROGRESS NOTES
UPDATE    SW to pt's room for update. Pt presents as aaox3 with calm and pleasant affect. Pt reports coping adequately with no needs at this time. Pt confirms understanding she may d/c home with IV dobutamine. Pt reports no preference for infusion or home health companies. SW verbally referred pt to [facility] and confirmed they have access to pt's chart via Epic. SW providing psychosocial and counseling support, education, resources, and d/c planning as needed. SW continuing to follow and remains available.

## 2017-01-17 NOTE — PROGRESS NOTES
Progress Note  Heart Transplant Service    Admit Date: 1/4/2017   LOS: 13 days     SUBJECTIVE:     Follow up for: Acute decompensated heart failure    Interval History: Patient reports she got dizzy after pulmonary lab testing, and was unable to work with PT.  She has generalized itching without a rash that improves with Benadryl     Scheduled Meds:   magnesium oxide  400 mg Oral BID    pantoprazole  40 mg Oral Daily    senna-docusate 8.6-50 mg  1 tablet Oral BID    sodium chloride 0.9%  10 mL Intravenous Q6H    sodium chloride 0.9%  3 mL Intravenous Q8H     Continuous Infusions:   DOBUTamine 2.5 mcg/kg/min (01/16/17 1732)    heparin (porcine) in 5 % dex 21 Units/kg/hr (01/16/17 1732)     PRN Meds:aluminum & magnesium hydroxide-simethicone, calcium carbonate, diphenhydrAMINE, diphenhydrAMINE-zinc acetate 1-0.1%, heparin (PORCINE), nitroGLYCERIN, ondansetron, promethazine (PHENERGAN) IVPB, Flushing PICC Protocol **AND** sodium chloride 0.9% **AND** sodium chloride 0.9%    Review of patient's allergies indicates:  No Known Allergies    OBJECTIVE:     Vital Signs (Most Recent)  Temp: 98.1 °F (36.7 °C) (01/16/17 2000)  Pulse: (!) 141 (01/17/17 0700)  Resp: 18 (01/17/17 0400)  BP: (!) 90/51 (01/17/17 0400)  SpO2: 96 % (01/17/17 0400)    Vital Signs Range (Last 24H):  Temp:  [97.7 °F (36.5 °C)-98.2 °F (36.8 °C)]   Pulse:  [115-141]   Resp:  [18]   BP: (83-99)/(51-57)   SpO2:  [96 %-97 %]     I & O (Last 24H):  Intake/Output Summary (Last 24 hours) at 01/17/17 0740  Last data filed at 01/17/17 0422   Gross per 24 hour   Intake              720 ml   Output              700 ml   Net               20 ml            Telemetry: sinus tach   Constitutional: Oriented to person, place, and time. Appears well-developed and well-nourished.   Neck: JVD approx 8 cm H2O  Cardiovascular: Tachycardic rate, regular rhythm and intact distal pulses.  No murmurs heard, S3   Pulmonary/Chest: Clear  Abdominal: + BS, exhibits no  distension. There is no tenderness. There is no rebound.   Extremities: no cyanosis, clubbing or edema    Labs:     Recent Labs  Lab 01/15/17  0515 01/16/17  0303 01/17/17  0427   WBC 9.62 8.80 8.30   HGB 12.3 12.1 11.9*   HCT 40.2 38.7 37.8    205 234   LYMPH 26.1  2.5 32.2  2.8 34.5  2.9   MONO 7.1  0.7 8.0  0.7 7.1  0.6   EOSINOPHIL 2.1 2.2 1.7         Recent Labs  Lab 01/11/17  1636  01/15/17  0515 01/16/17  0303 01/17/17  0427   APTT 105.8*  --   --   --   --    INR  --   < > 1.0 1.0 1.0   < > = values in this interval not displayed.     Recent Labs  Lab 01/15/17  0515  01/16/17  0303 01/16/17  1742 01/17/17 0427   *  --  110  --  117*   CALCIUM 9.2  --  9.2  --  9.4   ALBUMIN 3.0*  --  3.0*  --  3.2*   PROT 6.8  --  6.8  --  7.0     --  138  --  139   K 5.1  --  4.7  --  4.7   CO2 27  --  26  --  25     --  104  --  103   BUN 14  --  13  --  12   CREATININE 1.1  --  1.0  --  1.1   ALKPHOS 50*  --  50*  --  47*   ALT 28  --  25  --  25   AST 22  --  22  --  22   BILITOT 0.6  --  0.6  --  0.6   MG 2.0  < > 1.9 1.7 2.1   PHOS 4.3  --  4.1  --  4.9*   < > = values in this interval not displayed.  Estimated Creatinine Clearance: 69.7 mL/min (based on Cr of 1.1).      Recent Labs  Lab 01/16/17  0303   *         Recent Labs  Lab 01/12/17  1753   *       Microbiology Results (last 7 days)     Procedure Component Value Units Date/Time    Blood culture [[id number]] Collected:  01/11/17 2330    Order Status:  Completed Specimen:  Blood Updated:  01/17/17 0612     Blood Culture, Routine No growth after 5 days.            ASSESSMENT:     62 y/o with no PMHx, recently diagnosed with heart failure, [place] with no evidence of CAD, transferred from [facility] in [place] for advanced heart failure consideration.    PLAN:     Acute on Chronic Combined Systolic and Diastolic Heart Failure, Diagnosed 11/2016 LVEDD 5.6 cm  -NICM, NYHA IV, EF 15-20%, LVEDD: 5.6  -attempted GDMT  with Lisinopril and Spironolactone but was d/c'ed 2/2 hypotension  - 2.5 mcg/kg/min, PICC line placed. Unable to titrate GDMT secondary to hypotension  -Continue education & await financial clearance for VAD & OHTx  -2 g Na dietary restriction, 1500 mL fluid restriction, strict I/Os     Chest Pain, resolved  -Suspect it is non-cardiac, she has been chest pain free for >48 hours   -Troponins negative, EKG without ischemic changes  -Will continue to monitor        LV Apical Thrombus  -Continue heparin and starting Coumadin today   -Present on repeat 2D echo 1/14/17      Dorinda Christian PA-C  [facility] spectralink: [id number]

## 2017-01-18 PROBLEM — E11.9 TYPE 2 DIABETES MELLITUS WITHOUT COMPLICATION: Status: ACTIVE | Noted: 2017-01-01

## 2017-01-18 PROBLEM — Z01.818 PRE-TRANSPLANT EVALUATION FOR HEART TRANSPLANT: Status: ACTIVE | Noted: 2017-01-01

## 2017-01-18 NOTE — PLAN OF CARE
Problem: Physical Therapy Goal  Goal: Physical Therapy Goal  Goals to be met by: 2017     Patient will increase functional independence with mobility by performin. Supine to sit with Modified [place] - met  2. Sit to supine with Modified [place] - met  3. Sit to stand transfer with Modified [place] - met   4. Bed to chair transfer with Modified [place] -not met  5. Gait x 200 feet with Supervision -not met  6. Ascend/descend 5 stairs with no Handrails Stand-by Assistance - not met   Outcome: Ongoing (interventions implemented as appropriate)  Goals reviewed and remain appropriate. Pt progressing towards goals.     Pilar Phoenix, PT, DPT   2017  [phone]

## 2017-01-18 NOTE — CONSULTS
Pre Transplant Infectious Diseases Consult  Heart Transplant Recipient Evaluation    Requesting Physician: Comfort Pereira MD    Reason for Visit:  Heart transplant evaluation    History of Present Illness  Vaishnavi Baca is a 61 y.o. year old female with advanced Heart disease currently being evaluated for Heart transplant.  Patient denies any recent fever, chills, or infective illnesses.      1) Do you have a history of:   YES NO   Diabetes      [] [x]     Diabetic Foot Infection/Bone Infection  []        [x]     Surgical Removal of Spleen   []        [x]       2) Have you had recurrent infections involving:         Organs:   YES NO  Sinus infections  []         [x]   Sore Throat   []         [x]                 Prostate Infections  []         [x]              Bladder Infections  []         [x]                     Kidney Infections  []         [x]                               Intestinal Infections  []         [x]      Skin Infections   []         [x]       Reproductive Infections []         [x]        Periodontal Disease  []         [x]        3)Have you ever had: YES     NO     Chicken Pox   [x]         []    Shingles   []         [x]    Orolabial Herpes  []         [x]    Genital Herpes  []         [x]    Cytomegalovirus  []         [x]    Jamal-Barr Virus  []         [x]    Hepatitis A   []         [x]    Hepatitis B   []         [x]     Hepatitis C   []         [x]    Syphilis   []         [x]    Intestinal parasites/worms []         [x]    Fungal Infections  []         [x]    Blood Infections  []         [x]       4) Have you ever been exposed   YES NO  To someone with tuberculosis?  []   [x]   If yes, what treatment did you receive:     5) What states have you lived in? [place]     6) What countries have you visited for more than 2 weeks? none                        YES NO  7) Did you have any associated travel infections? []  [x]       8) Are you planning to travel outside the    []  [x]   [place] after  your transplant?    9) Household                  YES NO  Do you have pets living in your house/pet contact? []         [x]   If yes, describe:     Do you spend time or live on a farm or    []         [x]   have livestock or other farm animals?  If yes, which ones:    Do you have a fish tank?          []   [x]       Do you have a litter box?     []         [x]     Do you fish or hunt?      []         [x]     Do you clean or skin fish or animals?   []         [x]     Do you consume raw or undercooked   []         [x]   meat, fish, or shellfish?      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      []         [x]     13)Do you hike, camp, or spend   time in wooded areas?     []         [x]        14) The patient's immunization history was reviewed.    Have you ever received:  YES NO UNKNOWN DATES   Routine Childhood vaccines  [x]         []  []      Influenza vaccine   []  [x]  []    Pneumovax vaccine   []  [x]  []     Tetanus-diptheria vaccine  []         []  [x]    Hepatitis A vaccine series       []  []  [x] Hep A IgG +   Hepatitis B vaccine series         []  [x]  []    Meningitis vaccine   []         [x]  []    Varicella vaccine   []         [x]  []        Based on the patients immunization history and serologies, immunizations were ordered:         Ordered  Not Ordered  Influenza vaccine     [x]    []   Hepatitis A vaccine series at 0 and 6 months []    [x]   Hepatitis B at 0, 1, and 6 months   [x]    []   Hepatitis B High Dose 0, 1, and 6 months  []    [x]   Prevnar vaccine     [x]    []   Pneumovax vaccine     []    [x]    TDap vaccine      [x]    []    Varicella vaccine     []    [x]   Menactra (meningitis) vaccine   []    [x]            The patient was encouraged to contact us about any problems that may develop after immunization and  possible side effects were reviewed.      Previous Transplant: no    Etiology of Heart Disease: heart failure    Allergies: Review of patient's allergies indicates no known allergies.    There is no immunization history on file for this patient.  Past Medical History   Diagnosis Date    Apical mural thrombus 01/04/2017    NICM (nonischemic cardiomyopathy) 01/04/2017    NICM (nonischemic cardiomyopathy) 01/04/2017     History reviewed. No pertinent past surgical history.   Social History     Social History    Marital status:      Spouse name: N/A    Number of children: N/A    Years of education: N/A     Occupational History    Not on file.     Social History Main Topics    Smoking status: Former Smoker     Packs/day: 0.25     Types: Cigarettes     Start date: 1/6/1977     Quit date: 9/6/2016    Smokeless tobacco: Not on file    Alcohol use Not on file    Drug use: Not on file    Sexual activity: Not on file     Other Topics Concern    Not on file     Social History Narrative       Review of Systems   Constitution: Negative for chills, decreased appetite, fever, weakness, malaise/fatigue, night sweats, weight gain and weight loss.   HENT: Negative for congestion, ear pain, headaches, hearing loss, hoarse voice, sore throat and tinnitus.    Eyes: Negative for blurred vision, redness and visual disturbance.   Cardiovascular: Negative for chest pain, leg swelling and palpitations.   Respiratory: Negative for cough, hemoptysis, shortness of breath, sputum production and wheezing.    Endocrine: Negative for cold intolerance and heat intolerance.   Hematologic/Lymphatic: Negative for adenopathy. Does not bruise/bleed easily.   Skin: Positive for itching. Negative for dry skin, rash and suspicious lesions.   Musculoskeletal: Negative for back pain, joint pain, myalgias and neck pain.   Gastrointestinal: Negative for abdominal pain, constipation, diarrhea, heartburn, nausea and vomiting.   Genitourinary:  Negative for dysuria, flank pain, frequency, hematuria, hesitancy and urgency.   Neurological: Negative for dizziness, numbness and paresthesias.   Psychiatric/Behavioral: Negative for depression and memory loss. The patient does not have insomnia and is not nervous/anxious.    Allergic/Immunologic: Negative for environmental allergies, HIV exposure, hives and persistent infections.     Physical Exam   Constitutional: She is oriented to person, place, and time. She appears well-developed and well-nourished. No distress.   HENT:   Head: Normocephalic and atraumatic.   Mouth/Throat: Uvula is midline, oropharynx is clear and moist and mucous membranes are normal. No oral lesions.   Upper and lower dentures   Eyes: Conjunctivae and EOM are normal. Pupils are equal, round, and reactive to light. No scleral icterus.   Neck: Normal range of motion.   Cardiovascular: Normal rate, regular rhythm and normal heart sounds.  Exam reveals no gallop and no friction rub.    No murmur heard.  Pulmonary/Chest: Effort normal and breath sounds normal. No respiratory distress. She has no wheezes. She has no rales.   Abdominal: Soft. Bowel sounds are normal. She exhibits no distension and no mass. There is no hepatosplenomegaly. There is no tenderness. There is no rebound and no guarding.   Musculoskeletal: She exhibits no edema.   Lymphadenopathy:        Head (right side): No submental, no submandibular, no tonsillar, no preauricular, no posterior auricular and no occipital adenopathy present.        Head (left side): No submental, no submandibular, no tonsillar, no preauricular, no posterior auricular and no occipital adenopathy present.     She has no cervical adenopathy.     She has no axillary adenopathy.        Right: No inguinal, no supraclavicular and no epitrochlear adenopathy present.        Left: No inguinal, no supraclavicular and no epitrochlear adenopathy present.   Neurological: She is alert and oriented to person, place,  and time.   Skin: Skin is warm, dry and intact. No rash noted.   Psychiatric: She has a normal mood and affect.     Diagnostics:   RPR   Date Value Ref Range Status   01/13/2017 Non-reactive Non-reactive Final     No results found for: CMVANTIBODIE  No results found for: HIV1X2  No results found for: HTLVIIIANTIB  Hepatitis B Surface Ag   Date Value Ref Range Status   01/13/2017 Negative  Final     Hep B Core Total Ab   Date Value Ref Range Status   01/13/2017 Negative  Final     Hepatitis C Ab   Date Value Ref Range Status   01/13/2017 Negative  Final     Toxoplasma gondii IGG   Date Value Ref Range Status   01/13/2017 41.5 (H) 0.0 - 6.4 IU/mL Final     No components found for: TOXOIGGINTER  HSV 1 IgG   Date Value Ref Range Status   01/13/2017 Negative Negative Final     HSV 2 IgG   Date Value Ref Range Status   01/13/2017 Positive (A) Negative Final     Varicella IgG   Date Value Ref Range Status   01/13/2017 2.67 (H) 0.00 - 0.90 ISR Final     Varicella Interpretation   Date Value Ref Range Status   01/13/2017 Positive (A) Negative Final     Comment:     <or=0.90     Negative        No detectable IgG antibody to Varicella zoster  by the DONTRELL test. Such individuals are presumed to be   uninfected with Varicella zoster and to be susceptible to   primary infection.  0.91-1.09    Equivocal  >or=1.10     Positive        Indicates presence of detectable IgG antibody to Varicella   zoster by the DONTRELL test. Indicative of previous or current   infection.       No results found for: STRONGANTIGG  Jamal-Barr Virus IgG (VCA)   Date Value Ref Range Status   01/13/2017 Positive (A) Negative Final     Hep B S Ab   Date Value Ref Range Status   01/13/2017 Negative  Final     No results found for: QUANTIFERON  No results found for: HEPAIGM  No results found for: PPD  No results found for this or any previous visit.         Transplant Infectious Diseases - Candidacy    Assessment/Plan:     Transplant Candidacy: Based on  available information, there are no identified significant barriers to transplantation from an infectious disease standpoint pending acceptable serologies.      Quantiferon gold and strongyloides IgG pending. If positive, please re-consult ID.    Vaccines:  Flu today then yearly  TDAP today then every 10 years  Hepatitis B vaccine series initiated (rx given for 2nd and 3rd doses)  Prevnar today; pneumovax in 8 weeks (rx given)    Final determination of transplant candidacy will be made once evaluation is complete and reviewed by the Transplant Selection Committee.    ROSEANNE Meyer         Counseling:I discussed with Vaishnavi the risk for increased susceptibility to infections following transplantation including increased risk for infection right after transplant and if rejection should occur.  The patients has been counseled on the importance of vaccinations including but not limited to a yearly flu vaccine.  Specific guidance has been provided to the patient regarding the patients occupation, hobbies and activities to avoid future infectious complications including but not limited to avoiding undercooked meats and seafood, proper hygiene, and contact with animals.

## 2017-01-18 NOTE — CONSULTS
Palliative Care Acknowledgement of Consult - 1/18/17 1256    Consult received.  Will touch base with team prior to seeing patient.  Full consult to follow.    Thank you for allowing us to be a part of the care of this patient.    Meena Alvarez LCSW, [facility] Center[facility]-SW

## 2017-01-18 NOTE — PT/OT/SLP PROGRESS
"Physical Therapy  Treatment    Vaishnavi Baca   MRN: [id number]   Admitting Diagnosis: Acute decompensated heart failure    PT Received On: 17  PT Start Time: 1412     PT Stop Time: 1428    PT Total Time (min): 16 min       Billable Minutes:  Therapeutic Activity 8 Therapeutic Exercise 8     Treatment Type: Treatment  PT/PTA: PT     PTA Visit Number: 0       General Precautions: Standard, fall  Orthopedic Precautions: N/A   Braces: N/A    Do you have any cultural, spiritual, Restorationism conflicts, given your current situation?: None    Subjective:  Communicated with RN prior to session.  "I'm just waiting for my lunch. I don't know if I'm ever going to get it."    Pain Ratin/10    Objective:   Patient found with: telemetry, PICC line    Functional Mobility:  Bed Mobility:   Supine to Sit: Independent    Transfers:  Sit <> Stand Assistance: Independent (x2 reps)  Sit <> Stand Assistive Device: No Assistive Device  Bed <> Chair Technique: Stand Pivot  Bed <> Chair Assistance: Supervision  Bed <> Chair Assistive Device: No Assistive Device    Gait:   Gait Distance: 236 ft.   Assistance 1: Stand by Assistance  Gait Assistive Device: No device  Gait Pattern: reciprocal  Gait Deviation(s): decreased gurinder, decreased step length  -Mod v/c on pursed lip breathing 2* mild SOB     Balance:   Static Sit: GOOD+: Takes MAXIMAL challenges from all directions.    Dynamic Sit: GOOD+: Maintains balance through MAXIMAL excursions of active trunk motion  Static Stand: GOOD: Takes MODERATE challenges from all directions  Dynamic stand: GOOD-: Needs SUPERVISION only during gait and able to self right with moderate      Therapeutic Activities and Exercises:  Seated therex BLE x10 reps each: AP, LAQ, hip flexion. Pt instructed to continue performing LE therex (I) x10-15 reps, 2-3x daily.      AM-PAC 6 CLICK MOBILITY  How much help from another person does this patient currently need?   1 = Unable, Total/Dependent Assistance  2 = A " lot, Maximum/Moderate Assistance  3 = A little, Minimum/Contact Guard/Supervision  4 = None, Modified [place]/Independent    Turning over in bed (including adjusting bedclothes, sheets and blankets)?: 4  Sitting down on and standing up from a chair with arms (e.g., wheelchair, bedside commode, etc.): 4  Moving from lying on back to sitting on the side of the bed?: 4  Moving to and from a bed to a chair (including a wheelchair)?: 4  Need to walk in hospital room?: 3  Climbing 3-5 steps with a railing?: 3  Total Score: 22    AM-PAC Raw Score CMS G-Code Modifier Level of Impairment Assistance   6 % Total / Unable   7 - 9 CM 80 - 100% Maximal Assist   10 - 14 CL 60 - 80% Moderate Assist   15 - 19 CK 40 - 60% Moderate Assist   20 - 22 CJ 20 - 40% Minimal Assist   23 CI 1-20% SBA / CGA   24 CH 0% Independent/ Mod I     Patient left up in chair with all lines intact, call button in reach and pt's  present.    Assessment:  Vaishnavi Baca is a 61 y.o. female with a medical diagnosis of Acute decompensated heart failure. Pt progressing ambulation distance but with mild SOB during, requiring cues on pursed lip breathing. Impaired endurance and deconditioning also limiting her ability to perform (I) functional mobility. Pt would continue to benefit from skilled IP PT in order to address current deficits and progress functional mobility.     Rehab identified problem list/impairments: Rehab identified problem list/impairments: weakness, impaired endurance, impaired functional mobilty, impaired balance, decreased safety awareness, impaired cardiopulmonary response to activity    Rehab potential is good.    Activity tolerance: Good    Discharge recommendations: Discharge Facility/Level Of Care Needs: home     Barriers to discharge: Barriers to Discharge: Inaccessible home environment    Equipment recommendations: Equipment Needed After Discharge: none     GOALS:   Physical Therapy Goals        Problem: Physical  Therapy Goal    Goal Priority Disciplines Outcome Goal Variances Interventions   Physical Therapy Goal     PT/OT, PT Ongoing (interventions implemented as appropriate)     Description:  Goals to be met by: 2017     Patient will increase functional independence with mobility by performin. Supine to sit with Modified [place] - met  2. Sit to supine with Modified [place] - met  3. Sit to stand transfer with Modified [place] - met   4. Bed to chair transfer with Modified [place] -not met  5. Gait  x 200 feet with Supervision -not met  6. Ascend/descend 5 stairs with no Handrails Stand-by Assistance - not met                   PLAN:    Patient to be seen 3 x/week  to address the above listed problems via gait training, therapeutic activities, therapeutic exercises  Plan of Care expires: 17  Plan of Care reviewed with: patient        Pilar Rasheedard, PT, DPT   2017  [phone]

## 2017-01-18 NOTE — PLAN OF CARE
Problem: Patient Care Overview  Goal: Plan of Care Review  Outcome: Ongoing (interventions implemented as appropriate)  No significant events this shift. Patient free from falls, injury and complaints. VSS.  and hepatin gtt maintained per MD orders. Patient continues to complain of extreme itching which is poorly managed with benadryl. Plan to DC home with PICC line and home Dobutamine. Will continue to monitor.

## 2017-01-18 NOTE — SUBJECTIVE & OBJECTIVE
Interval HPI:   Overnight events: none  Eatin%  Nausea: No  Hypoglycemia and intervention: No  Fever: No  TPN and/or TF: No  If yes, type of TF/TPN and rate: n/a    PMH, PSH, FH, SH updated and reviewed     REVIEW OF SYSTEMS:  Constitutional: Negative for weight changes.  Eyes: Negative for visual disturbance.  Respiratory: Positive for dyspnea, Negative for cough.   Cardiovascular: Negative for chest pain.  Gastrointestinal: Negative for nausea  Endocrine: denies polyuria, polydipsia, nocturia.  Musculoskeletal: Negative for back pain.  Skin: Negative for rash.  Neurological: Negative for syncope.  Psychiatric/Behavioral: Negative for depression, anxiety.   All other systems reviewed and are negative.    Labs Reviewed and Include:      Recent Labs  Lab 17  0525   *   CALCIUM 9.1   ALBUMIN 3.0*   PROT 6.7      K 4.4   CO2 25      BUN 14   CREATININE 1.1   ALKPHOS 46*   ALT 23   AST 21   BILITOT 0.6     Lab Results   Component Value Date    HGBA1C 7.6 (H) 2017       Nutritional status:   Body mass index is 30.53 kg/(m^2).  Lab Results   Component Value Date    ALBUMIN 3.0 (L) 2017    ALBUMIN 3.2 (L) 2017    ALBUMIN 3.0 (L) 2017     Lab Results   Component Value Date    PREALBUMIN 16 (L) 2017    PREALBUMIN 16 (L) 2017    PREALBUMIN 17 (L) 2017       Estimated Creatinine Clearance: 69.7 mL/min (based on Cr of 1.1).    POCT Glucose results:    Current Insulin Regimen:    PHYSICAL EXAMINATION:  Vitals:    17 1100   BP: 99/67   Pulse: (!) 134   Resp: 16   Temp: 98 °F (36.7 °C)     Body mass index is 30.53 kg/(m^2).    Constitutional: well-developed, well-nourished  ENMT: External ears, nose with no mass or significant findings, Hearing intact  Neck:  No tracheal deviation present, No neck masses noted.  No thyromegaly or thyroid tenderness.  Cardiovascular:  No murmurs or abnormal sounds, no lower extremity edema bilaterally  Respiratory:   Normal effort, no use of accessory muscles, Normal breath sounds without adventitious sounds  Abdomen:  Soft, no masses, no tenderness, Bowel sounds are normal. No hernia noted  Skin:  No rashes, lesions or ulcers, no induration or nodules  Psychiatric:  Judgement and insight good with normal mood and affect  Musculoskeletal: Unable to assess gait, digits and nails with no clubbing, cyanosis or petechiae.  Neurological: Cranial nerves are grossly intact.

## 2017-01-18 NOTE — CONSULTS
[facility]  Endocrinology  Diabetes Consult Note    Consult Requested by: Comfort Pereira MD   Reason for admit: Acute decompensated heart failure    HISTORY OF PRESENT ILLNESS:  Reason for Consult: pre-heart transplant evaluation    Diabetes diagnosis year:     Home Diabetes Medications:  none    How often checking glucose at home? n/a  BG readings on regimen: n/a  Hypoglycemia on the regimen?  n/a  Missed doses on regimen?  n/a    Diabetes Complications include:     Hyperglycemia    Complicating diabetes co morbidities:   CHF      HPI:   Patient is a 61 y.o. female with a diagnosis of NICM (EF 15%), DM type 2 admitted on 17 for decompensated heart failure. Currently on cardiology service and undergoing transplant evaluation. Endocrine consulted for pre-heart transplant evaluation. A1C notable for diabetes. Patient not aware of DM history before and had never previously been on medication. TSH check this admission and in normal range. No known thyroid disease. Patient not aware of vit d deficiency, not currently on as outpatient.        Interval HPI:   Overnight events: none  Eatin%  Nausea: No  Hypoglycemia and intervention: No  Fever: No  TPN and/or TF: No  If yes, type of TF/TPN and rate: n/a    PMH, PSH, FH, SH updated and reviewed     REVIEW OF SYSTEMS:  Constitutional: Negative for weight changes.  Eyes: Negative for visual disturbance.  Respiratory: Positive for dyspnea, Negative for cough.   Cardiovascular: Negative for chest pain.  Gastrointestinal: Negative for nausea  Endocrine: denies polyuria, polydipsia, nocturia.  Musculoskeletal: Negative for back pain.  Skin: Negative for rash.  Neurological: Negative for syncope.  Psychiatric/Behavioral: Negative for depression, anxiety.   All other systems reviewed and are negative.    Labs Reviewed and Include:      Recent Labs  Lab 17  0525   *   CALCIUM 9.1   ALBUMIN 3.0*   PROT 6.7      K 4.4   CO2 25   CL  105   BUN 14   CREATININE 1.1   ALKPHOS 46*   ALT 23   AST 21   BILITOT 0.6     Lab Results   Component Value Date    HGBA1C 7.6 (H) 01/12/2017       Nutritional status:   Body mass index is 30.53 kg/(m^2).  Lab Results   Component Value Date    ALBUMIN 3.0 (L) 01/18/2017    ALBUMIN 3.2 (L) 01/17/2017    ALBUMIN 3.0 (L) 01/16/2017     Lab Results   Component Value Date    PREALBUMIN 16 (L) 01/18/2017    PREALBUMIN 16 (L) 01/16/2017    PREALBUMIN 17 (L) 01/13/2017       Estimated Creatinine Clearance: 69.7 mL/min (based on Cr of 1.1).    POCT Glucose results:    Current Insulin Regimen:    PHYSICAL EXAMINATION:  Vitals:    01/18/17 1100   BP: 99/67   Pulse: (!) 134   Resp: 16   Temp: 98 °F (36.7 °C)     Body mass index is 30.53 kg/(m^2).    Constitutional: well-developed, well-nourished  ENMT: External ears, nose with no mass or significant findings, Hearing intact  Neck:  No tracheal deviation present, No neck masses noted.  No thyromegaly or thyroid tenderness.  Cardiovascular:  No murmurs or abnormal sounds, no lower extremity edema bilaterally  Respiratory:  Normal effort, no use of accessory muscles, Normal breath sounds without adventitious sounds  Abdomen:  Soft, no masses, no tenderness, Bowel sounds are normal. No hernia noted  Skin:  No rashes, lesions or ulcers, no induration or nodules  Psychiatric:  Judgement and insight good with normal mood and affect  Musculoskeletal: Unable to assess gait, digits and nails with no clubbing, cyanosis or petechiae.  Neurological: Cranial nerves are grossly intact.         Labs Reviewed and Include     Recent Labs  Lab 01/18/17  0525   *   CALCIUM 9.1   ALBUMIN 3.0*   PROT 6.7      K 4.4   CO2 25      BUN 14   CREATININE 1.1   ALKPHOS 46*   ALT 23   AST 21   BILITOT 0.6     Lab Results   Component Value Date    WBC 7.98 01/18/2017    HGB 11.4 (L) 01/18/2017    HCT 35.7 (L) 01/18/2017    MCV 75 (L) 01/18/2017     01/18/2017       Recent  Labs  Lab 01/12/17  1753   TSH 1.056   FREET4 0.94     Lab Results   Component Value Date    HGBA1C 7.6 (H) 01/12/2017       Nutritional status:   Body mass index is 30.53 kg/(m^2).  Lab Results   Component Value Date    ALBUMIN 3.0 (L) 01/18/2017    ALBUMIN 3.2 (L) 01/17/2017    ALBUMIN 3.0 (L) 01/16/2017     Lab Results   Component Value Date    PREALBUMIN 16 (L) 01/18/2017    PREALBUMIN 16 (L) 01/16/2017    PREALBUMIN 17 (L) 01/13/2017       Estimated Creatinine Clearance: 69.7 mL/min (based on Cr of 1.1).    Accu-Checks  No results for input(s): POCTGLUCOSE in the last 72 hours.     ASSESSMENT and PLAN    Pre-transplant evaluation for heart transplant  No absolute contraindications to heart transplant from endocrinology  Patient informed of risks following transplant including worsening glycemic control and risk for osteoporosis  Thyroid function tests normal range this admission  Recommend checking Vit D level      Type 2 diabetes mellitus without complication  Blood glucose goal 140-180  poct glucose AC/HS with low dose correction insulin  Glycemic control likely to worsen following heart transplant and high dose steroids      * Acute decompensated heart failure  Management per HTS -- current evaluation for heart transplant        Los De León MD  Endocrinology  [facility]

## 2017-01-18 NOTE — CONSULTS
"Dermatology Inpatient Consult Note:  1/18/2017  1:51 PM    Reason for consult:  Rash x few days    HPI: 61 y.o. yo female with h/o NICM (15%) who [place] [facility] [place].  Derm was consulted today for itchy rash x a few days.  Pt states that symptoms of itch and rash began a few days ago and have persisted since that time.  Cannot recall if symptoms worsen after receiving certain medications, but does note significant improvement after receiving Benadryl.  Describes rash as "red bumps" that spread from her chest to her arms and legs.      Of note, patient has just received Benadryl dose and states that she "doesn't have much of a rash" at present time.    Scheduled Meds:   cetirizine  5 mg Oral Daily    famotidine  20 mg Oral BID    furosemide  20 mg Oral Daily    magnesium oxide  400 mg Oral BID    senna-docusate 8.6-50 mg  1 tablet Oral BID    sodium chloride 0.9%  10 mL Intravenous Q6H    sodium chloride 0.9%  3 mL Intravenous Q8H    warfarin  5 mg Oral Daily     Continuous Infusions:   DOBUTamine 2.5 mcg/kg/min (01/18/17 0910)    heparin (porcine) in 5 % dex 21 Units/kg/hr (01/17/17 2254)     PRN Meds:.aluminum & magnesium hydroxide-simethicone, calcium carbonate, diphenhydrAMINE-zinc acetate 1-0.1%, heparin (PORCINE), nitroGLYCERIN, ondansetron, promethazine (PHENERGAN) IVPB, Flushing PICC Protocol **AND** sodium chloride 0.9% **AND** sodium chloride 0.9%    Review of patient's allergies indicates:  No Known Allergies    Past Medical History   Diagnosis Date    Apical mural thrombus 01/04/2017    NICM (nonischemic cardiomyopathy) 01/04/2017    NICM (nonischemic cardiomyopathy) 01/04/2017       History reviewed. No pertinent past surgical history.    Social History     Social History    Marital status:      Spouse name: N/A    Number of children: N/A    Years of education: N/A     Occupational History    Not on file.     Social History Main Topics    Smoking " status: Former Smoker     Packs/day: 0.25     Types: Cigarettes     Start date: 1/6/1977     Quit date: 9/6/2016    Smokeless tobacco: Not on file    Alcohol use Not on file    Drug use: Not on file    Sexual activity: Not on file     Other Topics Concern    Not on file     Social History Narrative       History reviewed. No pertinent family history.    Review of Systems:  Constitutional:  no fevers, chills, fatigue, or malaise.  HEENT: no headaches, dysphagia, or mouth sores.  Ocular: no eye irritation or blurry vision.  Heme: no easy bruising or bleeding.  Musculoskeletal: no arthralgias, joint swelling, or myalgias.  GI: no nausea, vomiting, or diarrhea.  : no genital sores, dysuria, or genital itching.  Neuro: no numbness or tingling.  CV: no chest pain or shortness of breath.  Skin: +pruritus and rash; no burning, pain, blisters or history of keloidal scarring    Physical Exam:  Vitals:    01/18/17 1300   BP:    Pulse: (!) 128   Resp:    Temp:      General: NAD, WDWN.  Psych: AAOx3.  HEENT: no mouth sores or nasal lesions.  Lymph: no lymphadenopathy.  GI: abdomen soft and nontender.  CV: RRR, no labored breathing.  Ocular: no ocular lesions or conjunctivitis.  Musculoskeletal: no joint edema or deformity noted.  Extremities: no peripheral edema.  Skin:  - Scalp: No concerning lesions  - Face: No concerning lesions  - Neck: No concerning lesions  - Chest: Few grouped excoriated pink papules on central chest   - Back: No concerning lesions  - Abdomen: No concerning lesions  - Genitalia/buttocks: No concerning lesions  - RUE: No concerning lesions  - LUE: No concerning lesions  - RLE: No concerning lesions  - LLE: No concerning lesions  - Hands: No concerning lesions  - Feet: No concerning lesions    Labs:  TSH, FT4 WNL  WBC, eos: WNL  Liver Enzymes: WNL    ASSESSMENT/PLAN:    Rash, non-specific:  - No significant rash present on exam today, though pt had recently received Benadryl prior to my  evaluation.  - Based on patient's history and description of symptoms coupled with resolution of itch and skin lesions with Benadryl, most consistent with Urticarial reaction vs other drug eruption.  - Discussed this with patient and family and instructed patient to take picture of lesions with her phone as they arise.  - Any medication can precipitate urticarial reaction.morbilliform drug reaction, most common causes include: opiates, ASA/NSAIDs, ACE-inhibitors.  - Recommend discontinuing all non-essential medications.  Also recommend looking into med history for recently added meds (Coumadin, heparin, dobutamine, etc). * Monitor timing of medications and possibly ask patient/nurse to document timing of flares*  - Infections can also precipitate urticarial reaction: would check UA and urine cx for possible occult UTI.   - Regarding symptoms of itch: Recommend continuing Cetirizine 10 mg daily, can continue Benadryl PRN or can switch to Atarax 25 mg PO q8h PRN itch (caution pt of drowsy side effects).  Can also apply anti-itch lotion to pruritic skin up to TID.     Please call if rash progresses/changes in character/worsens.    Discussed with staff.    Magalie Olivarez MD  [facility] Dermatology, PGY-2  [phone]

## 2017-01-18 NOTE — ASSESSMENT & PLAN NOTE
Blood glucose goal 140-180  poct glucose AC/HS with low dose correction insulin  Glycemic control likely to worsen following heart transplant and high dose steroids

## 2017-01-18 NOTE — PROGRESS NOTES
Pt and  ROBERT.  Was at bedside x 50 minutes providing verbal and written VAD education.       Why MCSD is Needed  Your heart failure is defined as a condition in which your heart is unable to pump enough blood to support the basic needs of your body. This can make you feel tired, have abnormal rhythms and shortness of breath, in addition to causing your other organs to fail (e.g. liver or kidneys). You are being offered this treatment option because you have a marked increase risk of irreversible end-organ damage or death. For this reason, you are being considered for placement of a Mechanical Circulatory Support Device (MCSD) at [facility]. The heart pump is designed to take over the pumping action of your heart but before you undergo this procedure, it is important that you and your family understand the options, benefits, risks, and expectations associated with having a MCSD. It is required that you and your proposed caregiver(s) understand and agree with the treatment plan and are willing to participate in the guidelines outlined in the following pages.      ______At this time, you are being considered for a MCSD or more commonly called a Ventricular Assist Device (VAD) for Bridge to Transplantation. Bridge to transplant (BTT) is when a VAD is used to help extend the life of someone waiting for a heart transplant. This is subject to change pending the results from your evaluation and your Physicians decision. This consent pertains only to VAD therapy; you will receive information regarding heart transplantation allocation, procedures, and risks from the transplant program at a different time. Although you are being considered for MCSD implantation for Bridge to Transplantation, it is possible that you will not be a transplant candidate after you receive the MCSD if your medical condition worsens.     OR    _____At this time, you are being considered for a Ventricular Assist Device (VAD) for  Destination Therapy. Destination therapy is when a VAD is used as a long-term treatment for patients who are not candidates for transplant, such as those with end-stage congestive heart failure. In these patients, the pumps are placed permanently to help the heart work better. This is subject to change pending the results from your evaluation and your Physicians decision.      Types of Mechanical Circulatory Support Devices:  A MCSD is a pump that assists or replaces a weakened heart and carries blood to the rest of your body. There are several different types of mechanical circulatory support devices:    -Left ventricular assist devices (LVAD) help the left side of the heart pump blood to the largest artery of the body, the aorta.   -Right ventricular assist devices (RVAD) help the right side of the heart pump blood to the lungs. -Bi-ventricular assist devices (BVAD) help both sides of the heart pump.  -Heartmate II and Heartware are two types of pumps we may consider.  -Total Artificial Heart (YANNA) that replaces the heart and pumps blood to the body.    MCSDs can also be categorized into short and long-term therapies. Short-term devices are considered when patients are in cardiogenic shock and need help to pump blood for a few short hours to a few days/ weeks. Long-term devices are used for patients for months to years. Some patients may receive a short-term device prior to implantation of a long-term device.            When Is A VAD Used?  A VAD is used to assist the pumping action of a severely weakened heart. It works with your heart to improve and  to increase blood flow; it does not replace your own heart. When medications can no longer help, and other surgical options have been exhausted, a physician may recommend a VAD. VADs are most often used for patients experiencing New York Heart Association (NYHA) Class III-IV heart failure symptoms.    Alternative Options:  If you are not found to be a candidate for  VAD implantation or if you decide that a VAD is not the best option for you, you will continue to receive customary standard of care. You may also continue optimal medical management alone including the use of inotrope therapy. An inotrope is an IV medication that helps the strength of the hearts contraction. However, the reason that you are being considered for VAD implantation is because optimal medical management has not been adequate and without a VAD, your condition is likely to deteriorate over time. While going straight to transplant may be a possibilition, death is a possibility as well.    You May Not Be Eligible To Receive A VAD If You Are Found To Have Any Of The Following:   Any irreversible non-cardiac disease state with less than 2 year survival rate   Inadequate social support to be successful at home after surgery   Irreversible pulmonary disease or fixed pulmonary hypertension   Irreversible renal disease   Irreversible liver disease   Unresolved stroke or uncorrected cerebral vascular disease   A history of chronic noncompliance   Using illicit drugs or alcohol   Uncorrected thyroid disease   Significant right ventricular failure   Obstructive or restrictive cardiomyopathy   Amyloidosis   Active pericardial disease   Untreated aortic aneurysm   Irreversible cognitive dysfunction   History of psychiatric disease, uncontrolled affective disorder, or any cognitive dysfunction that may prevent you     from managing self-care   Diabetes with severe retinopathy or peripheral neuropathy   Obesity with BMI (body mass index) greater than 35   Severe chronic malnutrition   Uncorrected blood disorders   Active uncontrolled infection   Pregnancy (positive pregnancy test)   HIV positive or immunosuppression that could result in device infection   Muscular dystrophy, MS or similar disease states    Active systemic infection   Cancer   Insufficient funding      Possible Benefits:  The  overall goal is improved health and quality of life. In most cases, because circulation has been restored as a result of the VAD, you can expect to have more energy and also experience less heart failure symptoms. Since VADs help deliver more oxygen rich blood, you may feel well enough to resume many of the usual activities and hobbies that you enjoy. In fact, many patients return to work and are no longer disabled, depending on the type of work they do. Be aware that you may lose your disability post VAD based on review of your records and disability benefits. It is important that you speak with a  so that you may plan for this should it occur.   The improved circulation may prolong life and may improve some organ damage caused by your heart failure. This is supported by some studies that have shown that VAD patients have a longer survival than patients treated with medications alone. Although the VAD can improve your chances for survival, the type and severity of your heart disease may outweigh any benefits from the device and you may still die.    Possible Risks:  As with any surgery or procedure, there are risks and the possibility of complications. There are also risks related to the operation itself and undergoing anesthesia, and risks related to the device itself. You will discuss the risks in detail with the Cardiac Surgeon who intends to perform your surgery. Below is a list of risks related to the surgery and the VAD.                                           [facility]                                  INTERMACS   Bleeding                              13.2%             13.5%   Device Malfunction               4.3 3.7%   Infection                               11.1% 15.2%   Neurological Dysfunction     2.3% 3.7%   Rehospitalization                  49.3% 32.2%   Renal Dysfunction                2.9% 2.4%   Right Heart Failure                3.1% 3.0%       Anesthesia Risks:   During  the surgery you will be put under general anesthesia, which means you will be given medications to put you to sleep, block pain, and paralyze parts of your body. You will also be placed on a machine to help you breathe. The anesthesiologist will talk with you in more detail about the risks of anesthesia. At the time of surgery you will be required to sign a consent form for anesthesia.    Operative Procedure Risks:  There are many risks with this operation including but not limited to: death, heart attack, stroke, nerve injury, blood clots, damage to arteries needing limb amputation, bleeding and hemorrhage, hemolysis, infection, development of new antibodies in your blood, mediastinitis, arrhythmia, right heart failure, heart block, or the need for pacemaker or ICD implantation. Pump exchange due to complications with the pump is a possibility as well. In addition, the need for re-operation for any cause, renal, hepatic or pulmonary failure resulting in death or long-term need of ventilation or dialysis, and blood transfusion with its risk of HIV, and hepatitis. Studies have also shown that patients may have problems with memory, attention, and speed of processing thoughts after a cardiac surgical procedure. Any of these complications will be explained to you in more detail if you desire. In addition to these potential complications, there may be other risks that are currently unknown. The longer you are on the device, the greater the chances that complications will develop. It is also possible that you may reach a point where your quality of life is so impaired, that the decision to terminate your VAD-support will need to be addressed.     VAD Therapy Risks (After The Surgery):  Include but not limited to: death, need for re-operation, device malfunction or device infection, renal failure requiring dialysis, blood clots, stroke, pain, or bleeding. Pump exchange due to complications is a possibility. Patients may  also experience a potential decrease in their quality of life including limitations of their normal activities. In addition, 24-hour caregivers may experience increased stress in their day-to-day life as a result of caring for a loved one with a VAD.            Evaluation Process:  There will be many people involved in the evaluation process to assure that this is the best choice for you. You will receive a number of tests and consultations. Some of the people that may help evaluate you include Heart Failure Physicians, Cardiac Surgeons, Social Workers and VAD Coordinators. During this process, you will be given education about the VAD and the care that you would require. After the evaluation, the group will decide if you meet the criteria to have a VAD implanted. You may require or have already been implanted with a short-term VAD prior to surgery for a long-term VAD. Additional people you well meet include a financial counselor to discuss insurance and dressing supplies, research coordinators, anesthesiologist, psychiatrist/psychologist, physical therapist or occupational therapist to discuss rehabilitation after VAD, and dietician to improve nutrition. Some patients may be referred to other services for consultation. These may include, but are not limited to: infectious disease doctor, gastroenterologist, nephrologist (kidney doctor), pulmonologist (lung doctor), hepatologist (liver doctor), or an ophthalmologist (eye doctor). It is recommended that you see your dentist to ensure no infection is present and all needed dental work is completed before surgery. Cavities and rotten teeth can cause an infection which can lead to death.    Testing is required to determine VAD candidacy. These include but are not limited to the following:   Laboratory studies of blood and urine, chest x-ray, abdominal ultrasound, CT scan, echocardiogram, cardiopulmonary treadmill, right heart catheterization, electrocardiogram,  pulmonary function tests, colonoscopy or endoscopy, vascular studies, and pulmonary studies. Additionally, women will need to have an up-to-date mammogram and PAP test.     Device Choice:  VADs are currently approved by the Food and Drug Administration (FDA) to be used as Bridge to Transplantation (BTT) or Destination Therapy (DT). A full list will be provided for you and your family to review if desired. [facility] also participates in clinical trials with devices that are considered investigational, and are not yet FDA approved for BTT/DT. Your Surgeon and Cardiologist will discuss with you which device is the best option for you. VADs have four main parts: the implantable heart pump, a tube that passes through the skin of your abdomen (driveline), a controller (small computer) that controls the pump operation, and an external power source (batteries or power device). In addition, there are other VADs that are used temporarily when patients are in cardiogenic shock.    You have the right to refuse surgery at any time. This consent will help you to make an informed decision. If you choose that this is not the best option for you at this time, you may choose to be re-evaluated at a later time and you may choose to receive the implant at a later time if you are still a candidate.    Pre-operative Care expectations:   Informed surgical and anesthesia consents will be completed.   You will be admitted to the hospital a few days before the day of surgery for optimization before surgery (unless      already hospitalized).   Intra-aortic balloon pump or impella will be placed before surgery,    Status change if waiting for a transplant and what this means.   Your ICD will be turned off prior to surgery, and then turned back on after surgery. It will NOT be removed.   ICU (intensive care unit) visiting hours FAMILY CAN NOT STAY AT NIGHT (pamphlet given to them)    Names, addresses and phone numbers of local  MD, EMS with [employer] [place] [employer], Electrical company and pharmacy           will be required to be provided prior to the LVAD surgery.     Need for Coumadin and frequent blood work    Importance of medication compliance    Go over booklet/websites available    Learning the material for the device        Surgical Procedure:  The surgical procedure to implant the VAD will require open-heart surgery and can take on average between 6-12 hours. The surgeon will need to make an incision down the front of your chest to reach your heart. You will have a breathing tube (endotracheal or ET tube) and be under general anesthesia. The VAD is placed below the heart and the surgeon will connect the pump to your heart and secure it in place with sutures. Once the pump is in place, the VAD along with your natural heart will resume pumping blood through your body. After the surgery is completed, you will return to the ICU.    Post-Operative Care Expectations:  Upon arrival to the ICU, you will receive close monitoring and support from the following medical mechanisms:   Heart monitor (telemetry) to monitor heart rate and rhythm.   A breathing tube (endotracheal tube) to assist with breathing and maintain and open airway.   An oral-gastric tube will be utilized to keep the stomach empty when connected to suction, as well as to give the    nursing staff the capability to administer oral medications directly into the stomach.   A Tiwari catheter to measure urinary output.   A [place] Catheter to measure pressures within the heart and lungs.   An arterial line catheter in order to measure arterial blood pressure.   Chest tubes to collect and measure drainage from surgery.   A VAD driveline that exits the skin in the abdominal area and is connected to the VAD power source.   Temporary pacemaker wires which may aid in the event of an arrhythmia associated with heart surgery.    Your chest may be left open for 24 to 72 hours  after implantation, after which you will be taken back to the operating room to close your chest.    You will receive medications for sedation and to control your pain in order to achieve a tolerable level of comfort. You will also be on IV medications until your blood pressure and fluid status are stable. Your home medications will be resumed as soon as possible if still medically relevant. In addition to your previous taken medications, patients with VADs are commonly prescribed medications for anticoagulation/anti-platelet, antibiotics, blood pressure, and vitamin/mineral supplements. Your length of stay in the ICU will depend on how fast you recover. Once you are more stable, breathing on your own with your lines and tubes out, you will be transferred to a general care unit where you can expect to stay for another 1-3 weeks. On average, your total length of stay will be about three or four weeks after your surgery. During this time, it is expected that you and your family will begin to learn to manage the device and learn how to manage your care at home. Most patients are able to return home after VAD implantation, but this cannot be guaranteed. Complications may require a prolonged period of hospitalization, long term acute care facility, or inpatient rehabilitation stay locally.  If you are unattended and the device fails, you may not be able to perform the emergency procedures yourself, which could result in death and/or blood clots in the device.    Education:  Verbal, written, and visual educational materials are provided throughout your hospitalization and are available to anyone involved in your care at home. You and your caregiver(s) will be trained by a VAD Coordinator on how to manage your care and device. Other staff such as your bedside Nurse, the Occupational and Physical Therapists will also provide training to you. You and your caregiver(s) must show ability to manage the device, understand how it  operates, troubleshoot problems, and care for your driveline exit site. It is expected that a caregiver(s) will be present and available while you are in the hospital to learn how to manage the device and how to care for you when you are at home. The education will be an ongoing process while you are here at the hospital. Near the time of your expected discharge, your family and/or caregivers will be required to show competency in the care and management of you and your VAD. Once it has been determined that you and your caregiver(s) are competent in your VAD care, you will participate in an excursion around the hospital with your caregiver for 6 hours. In addition, a VAD Coordinator will ensure that your local fire department, emergency personnel, and any other community members will be given education materials and training as necessary. Your home must have consistent electricity and phone services; the outlets must be three pronged and grounded. Any additional safety needs are arranged during this time. You will need to have your glasses and hearing aids at the hospital in order to be educated, as soon as possible.   Caregiver Requirements:  Both before and after VAD implantation, a caregiver/support person MUST be able to attend comprehensive education sessions conducted by the VAD coordinators. You MUST come at a specified time that is agreed upon by the caregiver/support person and the VAD coordinator. We respectfully request this to be between 9am and 4pm on Monday to Friday. These are not scheduled sessions, but rather constant education to the caregiver and patient. If there are multiple caregiver/support people that need to learn the device, they need to all be present at the SAME TIME for education sessions. There will be only ONE designated caregiver to learn the dressing changes. This caregiver MUST perform at least FIVE dressing changes before they are considered competent to change a dressing by  themselves. If patient unable to be checked off before discharge, they may need 24 hour care.    Discharge Process:  Your daily progress will be followed by a team of people involved in your care including your Surgeon, Cardiologist, VAD Coordinators, Staff Nurses, Nurse Practitioners, Physician Assistants, Physical/Occupational Therapy, Social Workers, and a Discharge Planner. They will monitor your recovery and help you to adjust to life with a VAD. You must have a thermometer, weight scale, flashlight, and a blood pressure cuff at your home. Soon after your surgery, it will be very important to begin preparing for your discharge. You will have to be both physically recovered and show competence in the management of your VAD to be discharged. Most patients return home after successful outings; however, some patients choose to live with a caregiver or need a rehabilitation facility for a short period before returning home. If insurance allows, a Home Health nurse will be recommended to come to your home for two weeks and assist you in your care when you return home. The length of time that the Visiting Nurse will come to your home will depend on your overall recovery. It is recommended after you return home that you enroll in a Cardiac Rehab program to continue to improve your physical health.    Follow up care:  After you are discharged, you will follow up with your Surgeon, your Heart Failure Cardiologist and your VAD coordinator. They will collaboratively care for you and make decisions about your treatment. Typically, your first visit will be 1 week after discharge. We will see you every week for 3-4 weeks, followed by every 2 weeks for 1 month, then monthly thereafter while you have the VAD. You may be required to stay close to our medical center depending on how things are going. Once you are considered a stable established patient, your Physicians may decide that you can follow-up every 2-3 months. Along  with seeing your Cardiologist and Surgeon, you will have laboratory testing, and other physiological testing done on a regular basis in order to monitor and maintain your progress and health. The types of testing that you may need and the frequency will be decided by your Physicians but can include blood tests, EKG, Echocardiogram, Right Heart Catheterization, V02 Treadmill Stress Test, and Implantable Cardioverter Defibrillator (ICD) device check. If you have received an investigational VAD, you will have other testing that will be required for the research study. Many VADs require patients to take anticoagulation medications, also known as blood thinning medications (coumadin, warfarin). You will be required to have frequent blood draws, up to 2-3 times a week, in order to monitor your blood count and blood thinning agents. You will also be in frequent contact with a VAD Coordinator who will make phone calls to assess how you are doing at home and assist you with any problems that may arise. A VAD Coordinator and a Heart Failure Cardiologist are also available 24 hours a day in the event that you have an emergency. On average, you can expect that within 12 weeks after surgery, you will be able to return to most activities, with the permission of your VAD Team.    Lifestyle Changes:  You will have few limitations and can resume most usual activities. Certain activities are hazardous or fatal after implant. Persons with implantable VADs must not allow their controller/computer and electrical equipment to submerge in water. Showering is possible with proper protective equipment. You may only resume showering once your driveline has healed and your VAD coordinator gives their permission. Swimming and baths are prohibited. You cannot sleep on your stomach or the side the driveline is exiting your abdomen. Contact sports, repetitive jumping, or impact with an airbag are examples of activities that may cause trauma to  the pump attachments and must be avoided.         You may be sexually active but must care for your driveline. Female patients cannot get pregnant. Medical care after implant includes lifetime follow up to monitor device function and health status. You may not have a magnetic resonance imaging (MRI) test because of the magnetic fields. You may not vacuum, touch television or computer screens, or take hot clothes out of the dryer due to the static electricity. VAD therapy requires significant self-care responsibility and a willingness to participate with you VAD team. Driveline exit site dressings must be performed daily using sterile technique or as directed by the VAD team. Maintenance care of the device components, batteries, and driveline is necessary to prevent pump failure, infections, or other serious complications. You will continue to take medications for your heart failure although the dose and or medication name may change. You may continue to take your diuretic such as lasix or demedex. You may also continue to be on a fluid restriction. You may drive once approved by your VAD team. You may be able to travel with your VAD, depending on the situation.      VAD Equipment:  Along with the device that is implanted inside your body, you will have a number of other external pieces of equipment that will require care and maintenance. You will have a driveline that exits your body through your abdomen that will power a controller, which is the computer component that tells the heart pump how to perform. The controller will also tell you about alarms, sounds, and words, on how your pump is operating and if there are any problems. In order to power the device and the controller, you will have batteries and a battery charger and/or power device. The batteries allow you to be mobile and move freely without being attached to outlet power. The  or power device allows you to be connected to power for long  periods of time such as when you are sleeping. Different [facility]s have similar pieces of equipment, but will vary depending on the device you receive. You will receive education and teaching before you leave the hospital to make sure you understand clearly how to operate the equipment and troubleshoot problems that may occur.    Confidentiality:  Hospital personnel who are involved in the course of your care may review your medical record. Without your authorization communication between you and [facility] remains confidential. If you do become a candidate for transplant, data about your case, which will include your identity, will be sent to [employer] ([employer]) and may be sent to other places involved in the transplant process as permitted by law. Data about your VAD, which will include your identity, will be shared with the  of the device. Your information may also be entered into a registry for pump implants, known as INTERMACS. Your participation in this is voluntary, and will be discussed at greater length prior to implant.    End of Life:  If you are approaching the end of life, the VAD can be turned off. You may be in a hospital, home, or hospice setting. If you become very sick and do not have a chance to survive, we may talk about stopping the pump. The doctor would talk to you or your family about what is right for you. It is helpful to talk to your family about your goals before surgery so they know what your wishes are. A member of our Goals of Care team will visit you before surgery to help you learn your goals. You have the right to have the device turned off or to decline pump exchange if your pump fails or malfunctions.     Device Return:  At the time of either transplant or death, the surgeon may need to remove the device to return to the . You or a family member may need to sign a separate consent for removal of the device.    No questions at this time.    was quoting information to me from what he has read and verbalized they understand it all right now.

## 2017-01-18 NOTE — PLAN OF CARE
Problem: Patient Care Overview  Goal: Plan of Care Review  Outcome: Ongoing (interventions implemented as appropriate)  Patient is free of fall/trauma/injury. Denies CP, SOB, or pain/discomfort. Dobutamine and heparin drips ongoing. Pt tolerating well. Benadryl administered per PRN order for complaints of itching. Pt verbalizes satisfaction with relief measures. Plan of care discussed with pt. Pt verbalizes understanding. All questions addressed. Will continue to monitor

## 2017-01-18 NOTE — PROGRESS NOTES
[facility]  Adult Nutrition  Consult Note    SUMMARY     Recommendations    Recommendation/Intervention:   1. Continue current diet order.   2. Encouraged good PO intake of meals. Noted Alb 3, PAB 16, and CRP 15.   Goals: PO intake >75% with a high source of protein at each meal.   Nutrition Goal Status: goal met  Communication of RD Recs: reviewed with RN    Reason for Assessment    Reason for Assessment: RD follow-up  Diagnosis: cardiac disease (LVAD/OHTx w/u)  Relevent Medical History: HF   Nutrition Discharge Planning: Went over Vitamin K sources for Coumadin.     Nutrition Prescription Ordered    Current Diet Order: Cardiac     Nutrition Risk Screen     Nutrition Risk Screen: no indicators present    Nutrition/Diet History    Typical Food/Fluid Intake: Pt reports good PO intake of meals if she likes what she gets.   Factors Affecting Nutritional Intake: other (see comments) (none)    Labs/Tests/Procedures/Meds    Pertinent Labs Reviewed: reviewed  Pertinent Labs Comments: P 4.6, Alb 3, PAB 16, CRP 15  Pertinent Medications Reviewed: reviewed  Pertinent Medications Comments: coumadin    Physical Findings    Overall Physical Appearance: overweight  Oral/Mouth Cavity: WDL  Skin: intact    Anthropometrics    Height (inches): 70.98 in  Weight Method: Standard Scale  Weight (kg): 99.3 kg  Ideal Body Weight (IBW), Female: 154.9 lb  % Ideal Body Weight, Female (lb): 142.89   BMI (kg/m2): 30.88  BMI Grade: 30 - 34.9- obesity - grade I    Estimated/Assessed Needs    Weight Used For Calorie Calculations: 100.4 kg (221 lb 5.5 oz)   Height (cm): 180.3 cm  Energy Need Method: [place]-St Jeor (1.2 PAL: 2001kcal)  RMR ([place]-St. Jeor Equation): 1668.75  Weight Used For Protein Calculations: 100.4 kg (221 lb 5.5 oz)  Protein Requirements: 100g (1g/kg)  Fluid Need Method: RDA Method (or per MD)    Malnutrition (Undernutrition) Diagnosis    % Intake of Estimated Energy Needs: 75 - 100%  % Meal Intake:  75%    Nutrition Diagnosis    Nutrition Problem: Increased nutrient needs (protein)  Etiology/Related To: physiological need  Nutrition Diagnosis Signs/Symptoms As Evidenced By: HF, LVAD/OHTx w/u  Nutrition Diagnosis Status: Continues    Monitor and Evaluation    Food and Nutrient Intake: energy intake, food and beverage intake  Food and Nutrient Adminstration: diet order  Anthropometric Measurements: weight, weight change  Biochemical Data, Medical Tests and Procedures: other (specify) (All labs)  Nutrition-Focused Physical Findings: overall appearance    Nutrition Risk    Level of Risk: low    Nutrition Follow-Up    RD Follow-up?: Yes

## 2017-01-18 NOTE — ASSESSMENT & PLAN NOTE
No absolute contraindications to heart transplant from endocrinology  Patient informed of risks following transplant including worsening glycemic control and risk for osteoporosis  Thyroid function tests normal range this admission  Recommend checking Vit D level

## 2017-01-18 NOTE — PROGRESS NOTES
Progress Note  Heart Transplant Service    Admit Date: 1/4/2017   LOS: 14 days     SUBJECTIVE:     Follow up for: Acute decompensated heart failure    Interval History: Patient seen and examined, no events overnight, denied complaints. Denied CP, SOB, Cough, Fever, Chills.still has generalized itching without a rash that improves with Benadryl will get derm on board     Scheduled Meds:   cetirizine  5 mg Oral Daily    famotidine  20 mg Oral BID    furosemide  20 mg Oral Daily    magnesium oxide  400 mg Oral BID    senna-docusate 8.6-50 mg  1 tablet Oral BID    sodium chloride 0.9%  10 mL Intravenous Q6H    sodium chloride 0.9%  3 mL Intravenous Q8H    warfarin  5 mg Oral Daily     Continuous Infusions:   DOBUTamine 2.5 mcg/kg/min (01/18/17 0910)    heparin (porcine) in 5 % dex 21 Units/kg/hr (01/17/17 7324)     PRN Meds:aluminum & magnesium hydroxide-simethicone, calcium carbonate, diphenhydrAMINE-zinc acetate 1-0.1%, heparin (PORCINE), nitroGLYCERIN, ondansetron, promethazine (PHENERGAN) IVPB, Flushing PICC Protocol **AND** sodium chloride 0.9% **AND** sodium chloride 0.9%    Review of patient's allergies indicates:  No Known Allergies    OBJECTIVE:     Vital Signs (Most Recent)  Temp: 98 °F (36.7 °C) (01/18/17 1100)  Pulse: (!) 134 (01/18/17 1100)  Resp: 16 (01/18/17 1100)  BP: 99/67 (01/18/17 1100)  SpO2: 97 % (01/18/17 1100)    Vital Signs Range (Last 24H):  Temp:  [97.6 °F (36.4 °C)-98 °F (36.7 °C)]   Pulse:  [114-139]   Resp:  [16-18]   BP: ()/(51-67)   SpO2:  [96 %-99 %]     I & O (Last 24H):    Intake/Output Summary (Last 24 hours) at 01/18/17 1314  Last data filed at 01/18/17 0545   Gross per 24 hour   Intake              795 ml   Output             1400 ml   Net             -605 ml            Telemetry: sinus tach   Constitutional: Oriented to person, place, and time. Appears well-developed and well-nourished.   Neck: JVD approx 8 cm H2O  Cardiovascular: Tachycardic rate, regular rhythm and  intact distal pulses.  No murmurs heard, S3   Pulmonary/Chest: Clear  Abdominal: + BS, exhibits no distension. There is no tenderness. There is no rebound.   Extremities: no cyanosis, clubbing or edema    Labs:     Recent Labs  Lab 01/16/17 0303 01/17/17 0427 01/18/17  0525   WBC 8.80 8.30 7.98   HGB 12.1 11.9* 11.4*   HCT 38.7 37.8 35.7*    234 228   LYMPH 32.2  2.8 34.5  2.9 33.6  2.7   MONO 8.0  0.7 7.1  0.6 5.8  0.5   EOSINOPHIL 2.2 1.7 1.5         Recent Labs  Lab 01/11/17  1636  01/16/17 0303 01/17/17 0427 01/18/17  0526   APTT 105.8*  --   --   --   --    INR  --   < > 1.0 1.0 1.1   < > = values in this interval not displayed.     Recent Labs  Lab 01/16/17 0303 01/17/17 0427 01/17/17  1722 01/18/17  0525     --  117*  --  123*   CALCIUM 9.2  --  9.4  --  9.1   ALBUMIN 3.0*  --  3.2*  --  3.0*   PROT 6.8  --  7.0  --  6.7     --  139  --  140   K 4.7  --  4.7  --  4.4   CO2 26  --  25  --  25     --  103  --  105   BUN 13  --  12  --  14   CREATININE 1.0  --  1.1  --  1.1   ALKPHOS 50*  --  47*  --  46*   ALT 25  --  25  --  23   AST 22  --  22  --  21   BILITOT 0.6  --  0.6  --  0.6   MG 1.9  < > 2.1 1.9 1.9   PHOS 4.1  --  4.9*  --  4.6*   < > = values in this interval not displayed.  Estimated Creatinine Clearance: 69.7 mL/min (based on Cr of 1.1).      Recent Labs  Lab 01/16/17  0303   *         Recent Labs  Lab 01/12/17  1753   *       Microbiology Results (last 7 days)     Procedure Component Value Units Date/Time    Blood culture [[id number]] Collected:  01/11/17 2330    Order Status:  Completed Specimen:  Blood Updated:  01/17/17 0612     Blood Culture, Routine No growth after 5 days.            ASSESSMENT:     62 y/o with no PMHx, recently diagnosed with heart failure, [facility] with no evidence of CAD, transferred from [facility] in [place] for advanced heart failure consideration.    PLAN:     Acute on Chronic Combined Systolic and Diastolic  Heart Failure, Diagnosed 11/2016 LVEDD 5.6 cm  -NICM, NYHA IV, EF 15-20%, LVEDD: 5.6  -attempted GDMT with Lisinopril and Spironolactone but was d/c'ed 2/2 hypotension  - 2.5 mcg/kg/min, PICC line placed. Unable to titrate GDMT secondary to hypotension  -Continue education & await financial clearance for VAD & OHTx  -2 g Na dietary restriction, 1500 mL fluid restriction, strict I/Os  -Lasix 20 po daily added 1/18/2017  Chest Pain, resolved  -Suspect it is non-cardiac, she has been chest pain free for >48 hours   -Troponins negative, EKG without ischemic changes  -Will continue to monitor        LV Apical Thrombus  -Continue heparin bridge Coumadin started on 1/16/2017 INR today 1.1   -Present on repeat 2D echo 1/14/17    Itching:  -Switch benadryl to zyrtec today with Pepcid  -Dermatology consulted appreciate odilon     PT ordered     Clovis Jimenez MD  Cardiology Fellow, PGY-4  Pager: [phone]

## 2017-01-19 PROBLEM — R53.83 FATIGUE: Status: ACTIVE | Noted: 2017-01-01

## 2017-01-19 PROBLEM — I47.29 NSVT (NONSUSTAINED VENTRICULAR TACHYCARDIA): Status: ACTIVE | Noted: 2017-01-01

## 2017-01-19 PROBLEM — R07.9 CHEST PAIN: Status: ACTIVE | Noted: 2017-01-01

## 2017-01-19 NOTE — CONSULTS
Pre LVAD Goals of Care Consult Note  Palliative Care      Consult Requested By: [facility] team    Reason for Consult: _X__ Pre LVAD Goals of Care    SUBJECTIVE:     History of Present Illness:  Pt is a  60 y/o woman with PMHx of NICM (15%) with no other known prior medical history who presents as a transfer from [facility] in [place] for higher level of care for ADHF in the setting of severe NICM.      Per chart review, pt stated that her issues first started in November when she had a bad virus that she never feels like she recovered from.      Per chart review, she was first admitted on 12/11/16 when she presented to the hospital with SOB and was found to have NICM with EF of 15%. RHC and LHC were performed at that time that showed no evidence of CAD and elevated filling pressures. She was discharged with a Life Vest on 12/16. She returned on 12/18/16 with N/V and SOB and was treated for PNA with Levaquin. She then returned on 12/30/16 for a reported HR in the 30s for which she was evaluated for then discharged home from the ED. She most recently presented to [facility] on 1/2/17 for right sided chest pain at which time there was concern for PNA so she was started on CTX/Azithromycin and solumedrol. Her WBC was 17 at arrival, she was also still taking Levaquin from her prior admission.     Pt in process of LVAD workup as bridge to transplant.     Past Medical History   Diagnosis Date    Apical mural thrombus 01/04/2017    NICM (nonischemic cardiomyopathy) 01/04/2017    NICM (nonischemic cardiomyopathy) 01/04/2017     History reviewed. No pertinent past surgical history.  History reviewed. No pertinent family history.  Social History   Substance Use Topics    Smoking status: Former Smoker     Packs/day: 0.25     Types: Cigarettes     Start date: 1/6/1977     Quit date: 9/6/2016    Smokeless tobacco: None    Alcohol use None       OBJECTIVE:     Symptom Assessment: Pt states that she has been nauseated but  nausea relieved by medicine she was given.       PPS: _90%___%    Legal/ Advanced Directives:  Living Will:no  Resuscitate Status: Full  Decision-Making: yes  Medical Power of : pt's next of kin is her     Psychosocial: Pt lives with her  outside of [place]. Pt has two adult children and five grand-children. Pt used to work as for the [employer].     Spiritual: yes  Janie and Belief -  Christain    I - Importance- yes, bible at bedside.   .  C - Community- yes    A - Address in Care-  services offered      ASSESSMENT/Discussion:     Discussion conducted with the pt and pt's  and son who reported her goals for health care for getting an LVAD are as a bridge to transplant. The chief concern/fear reported pertaining to receiving an LVAD and the impact on his/her life was getting used to living with an LVAD.    The need for preparedness planning was discussed with special attention and in reference to:  a) device failure b) suboptimal QOL post LVAD procedure, c) impact on co- morbid conditions, and d) catastrophic complications such as stroke, renal failure/hemodialysis or other chronic critical illness. In particular, the following points should be noted in the event of:  1) Device failure: Discussed with pt and family. Pt verbalized understanding.   2) a) Post LVAD QOL goals are to not feel so tired and bridge to transplant. Pt hopes to increase both quantity and quality of life.    b) Chronic poor QOL is defined as feeling bad all the time and not being able to spend time with her family and go on trips.         3)   Catastrophic Complications:discussed. Pt and family have read through education and verbalized understanding of complications.         4)   Co-morbid conditions: Discussed. Both pt and family verbalized understanding.         Impression:   Clinical Impression: Pt is a 60 y/o female with advanced heart failure. Pt has good insight into purpose/ benefit of LVAD and risks  and complications that can occur    During the discussion the pt/(caregiver) demonstrated __excellent ___Xgood   __marginal__poor insight/understanding concerning the risk vs benefits of obtaining an LVAD    Plan/ Recommendations:   1) Preparedness planning completed.   2) Please re-consult if needed.     Gretta Hoffman MN, APRN, AGCNS    > 50% of 70 min visit spent in chart review, face to face discussion LVAD preparedness planning/ goals of care, symptom assessment, coordination of care and emotional support

## 2017-01-19 NOTE — TREATMENT PLAN
Vit D deficiency per Vit D level of 15. Recommend Vit D 2000 IU daily.  Recommend poct glucose AC/HS with low dose correction    Please call with any further questions.    Los De León MD  Endocrine Fellow

## 2017-01-19 NOTE — PROGRESS NOTES
"Pt aaox3 while lying in bed with son at bedside.  PT denies any questions at this time.  Pt given copies of completed and signed education.  PT verbalizes "I just do not feel good".  Verbal support offered.  Pt encouraged to complete reading LVAD education and will follow up with them soon.    "

## 2017-01-19 NOTE — PROGRESS NOTES
Progress Note  Heart Transplant Service    Admit Date: 1/4/2017   LOS: 15 days     SUBJECTIVE:     Follow up for: Acute decompensated heart failure    Interval History: Patient reports persistent nausea; last night and this morning.  No improvement with Zofran but Phenergan given after Zofran did help. Itching has improved.      Scheduled Meds:   cetirizine  5 mg Oral Daily    famotidine  20 mg Oral BID    furosemide  20 mg Oral Daily    magnesium oxide  400 mg Oral BID    senna-docusate 8.6-50 mg  1 tablet Oral BID    sodium chloride 0.9%  10 mL Intravenous Q6H    sodium chloride 0.9%  3 mL Intravenous Q8H    warfarin  5 mg Oral Daily     Continuous Infusions:   DOBUTamine 2.5 mcg/kg/min (01/18/17 0910)    heparin (porcine) in 5 % dex 21 Units/kg/hr (01/19/17 0609)     PRN Meds:aluminum & magnesium hydroxide-simethicone, calcium carbonate, diphenhydrAMINE, diphenhydrAMINE-zinc acetate 1-0.1%, heparin (PORCINE), insulin aspart, nitroGLYCERIN, ondansetron, promethazine (PHENERGAN) IVPB, Flushing PICC Protocol **AND** sodium chloride 0.9% **AND** sodium chloride 0.9%    Review of patient's allergies indicates:  No Known Allergies    OBJECTIVE:     Vital Signs (Most Recent)  Temp: 98 °F (36.7 °C) (01/18/17 2000)  Pulse: (!) 136 (01/19/17 0600)  Resp: 18 (01/19/17 0400)  BP: (!) 91/51 (01/19/17 0400)  SpO2: 98 % (01/19/17 0400)    Vital Signs Range (Last 24H):  Temp:  [98 °F (36.7 °C)-98.2 °F (36.8 °C)]   Pulse:  [117-137]   Resp:  [16-18]   BP: ()/(51-67)   SpO2:  [96 %-98 %]     I & O (Last 24H):    Intake/Output Summary (Last 24 hours) at 01/19/17 0754  Last data filed at 01/19/17 0500   Gross per 24 hour   Intake              180 ml   Output              750 ml   Net             -570 ml          Telemetry: sinus tach, NSVT, 30 beat run VT  Constitutional: Oriented to person, place, and time. Appears well-developed and well-nourished.   Neck: JVD approx 8 cm H2O  Cardiovascular: Tachycardic rate,  regular rhythm and intact distal pulses.  No murmurs heard, S3   Pulmonary/Chest: Faint crackles at the bases  Abdominal: + BS, exhibits no distension. There is no tenderness. There is no rebound.   Extremities: no cyanosis, clubbing or edema    Labs:     Recent Labs  Lab 01/17/17 0427 01/18/17  0525 01/19/17  0528   WBC 8.30 7.98 8.84   HGB 11.9* 11.4* 11.8*   HCT 37.8 35.7* 37.5    228 243   LYMPH 34.5  2.9 33.6  2.7 26.1  2.3   MONO 7.1  0.6 5.8  0.5 7.9  0.7   EOSINOPHIL 1.7 1.5 1.4         Recent Labs  Lab 01/17/17 0427 01/18/17  0526 01/19/17  0528   INR 1.0 1.1 1.2          Recent Labs  Lab 01/17/17 0427 01/17/17  1722 01/18/17  0525 01/19/17  0528   *  --  123* 125*   CALCIUM 9.4  --  9.1 9.5   ALBUMIN 3.2*  --  3.0* 3.3*   PROT 7.0  --  6.7 7.0     --  140 138   K 4.7  --  4.4 4.4   CO2 25  --  25 27     --  105 102   BUN 12  --  14 12   CREATININE 1.1  --  1.1 1.2   ALKPHOS 47*  --  46* 45*   ALT 25  --  23 20   AST 22  --  21 21   BILITOT 0.6  --  0.6 0.6   MG 2.1 1.9 1.9 2.0  2.0   PHOS 4.9*  --  4.6* 5.0*     Estimated Creatinine Clearance: 63.6 mL/min (based on Cr of 1.2).      Recent Labs  Lab 01/16/17  0303   *         Recent Labs  Lab 01/12/17  1753   *       Microbiology Results (last 7 days)     Procedure Component Value Units Date/Time    Urine culture [[id number]] Collected:  01/18/17 2223    Order Status:  Sent Specimen:  Urine from Urine, Clean Catch Updated:  01/18/17 2300    Blood culture [[id number]] Collected:  01/11/17 2330    Order Status:  Completed Specimen:  Blood Updated:  01/17/17 0612     Blood Culture, Routine No growth after 5 days.            ASSESSMENT:     62 y/o with no PMHx, recently diagnosed with heart failure, [facility] with no evidence of CAD, transferred from [facility] for advanced heart failure consideration.    PLAN:     Acute on Chronic Combined Systolic and Diastolic Heart Failure, Diagnosed 11/2016  LVEDD 5.6 cm  -NICM, NYHA IV, EF 15-20%, LVEDD: 5.6  -attempted GDMT with Lisinopril and Spironolactone but was d/c'ed 2/2 hypotension  - 2.5 mcg/kg/min, PICC line placed. Unable to titrate GDMT secondary to hypotension  -Lasix started 1/18. Patient net negative 6.1L.  SVO2 this am was 53.  Will monitor closely today   -Continue education & await financial clearance for VAD & OHTx  -Colorectal surgery consulted for colonoscopy but will hold off for now as pt not hemodynamically stable enough.  OBGyn consulted for workup  -2 g Na dietary restriction, 1500 mL fluid restriction, strict I/Os     Chest Pain, resolved  -Suspect it is non-cardiac, she has been chest pain free for >48 hours   -Troponins negative, EKG without ischemic changes  -Will continue to monitor    LV Apical Thrombus  -Continue heparin and starting Coumadin 1/17. INR 1.2   -Present on repeat 2D echo 1/14/17    NSVT/VT  -SVO2 53 today.  Will monitor closely on Dobutamine    DM  -Appreciate Endocrinology consultation  -HbgA1c: 7.6  -poct glucose AC/HS with low dose correction insulin    Itching:  -Switch benadryl to zyrtec today with Pepcid  -Dermatology consulted appreciate odilon Christian PA-C  HTS spectralink: [phone]

## 2017-01-19 NOTE — PLAN OF CARE
Problem: Patient Care Overview  Goal: Plan of Care Review  Outcome: Revised  Plan of care discussed with patient. Patient is free of fall/trauma/injury. Denies CP, SOB, or pain/discomfort. Pt complaining of nausea.  Treated with PRN nausea medications.  All questions addressed. Will continue to monitor

## 2017-01-19 NOTE — CONSULTS
Consult Note    Inpatient consult to Colorectal Surgery  Consult performed by: MELONY GRAFF  Consult ordered by: FINN OLIVEIRA        SUBJECTIVE:     History of Present Illness:   Patient is a 61 y.o. female y/o with no PMHx, recently diagnosed with heart failure, transferred from [facility] in [place] for advanced heart failure consideration. CRS is consulted as part of the heart transplant/LVAD work up. She has never had a colonoscopy. No family history of CRC. Denies any blood in stool or change in bowel habits.   She is currently on continuous heparin and dobumatine infusions.        Review of patient's allergies indicates:  No Known Allergies  Past Medical History   Diagnosis Date    Apical mural thrombus 01/04/2017    NICM (nonischemic cardiomyopathy) 01/04/2017    NICM (nonischemic cardiomyopathy) 01/04/2017     History reviewed. No pertinent past surgical history.  History reviewed. No pertinent family history.  Social History   Substance Use Topics    Smoking status: Former Smoker     Packs/day: 0.25     Types: Cigarettes     Start date: 1/6/1977     Quit date: 9/6/2016    Smokeless tobacco: None    Alcohol use None     Review of Systems   Constitutional: Positive for malaise/fatigue. Negative for chills and fever.   Respiratory: Negative for cough, shortness of breath and wheezing.    Cardiovascular: Negative for chest pain and palpitations.   Gastrointestinal: Positive for nausea. Negative for abdominal pain, blood in stool, constipation and diarrhea.   Neurological: Positive for weakness.     OBJECTIVE:     Vital Signs:  Temp:  [98.1 °F (36.7 °C)]   Pulse:  [114-136]   Resp:  [18]   BP: ()/(51-59)   SpO2:  [97 %-98 %]     Physical Exam   Constitutional: She is oriented to person, place, and time. She appears well-developed and well-nourished. No distress.   Eyes: Conjunctivae and EOM are normal.   Pulmonary/Chest: Effort normal. No respiratory distress.   Abdominal: Soft.  She exhibits no distension. There is no tenderness.   Neurological: She is alert and oriented to person, place, and time.   Skin: Skin is warm and dry.   Psychiatric: She has a normal mood and affect. Her behavior is normal.     Laboratory:  CBC:   Recent Labs  Lab 01/19/17 0528   WBC 8.84   RBC 4.87   HGB 11.8*   HCT 37.5      MCV 77*   MCH 24.2*   MCHC 31.5*     BMP:   Recent Labs  Lab 01/19/17  0528   *      K 4.4      CO2 27   BUN 12   CREATININE 1.2   CALCIUM 9.5   MG 2.0  2.0     CMP:   Recent Labs  Lab 01/19/17 0528   *   CALCIUM 9.5   ALBUMIN 3.3*   PROT 7.0      K 4.4   CO2 27      BUN 12   CREATININE 1.2   ALKPHOS 45*   ALT 20   AST 21   BILITOT 0.6     LFTs:   Recent Labs  Lab 01/19/17 0528   ALT 20   AST 21   ALKPHOS 45*   BILITOT 0.6   PROT 7.0   ALBUMIN 3.3*       ASSESSMENT/PLAN:     61 F undergoing evaluation for heart transplant/LVAS    Plan:  Per ROSEANNE Perera pt not stable enough for c-scope tomorrow   Please re-consult when she will be able to tolerate colonoscopy.     Aurora Morales NP

## 2017-01-19 NOTE — CONSULTS
Consult Note  Gynecology    Consult Requested By: LULA  Reason for Consult: Pre transplant workup    SUBJECTIVE:     History of Present Illness:  Patient is a 61 y.o.  with no prior medical history who presented with NICM last month. Patient currently being evaluated for a heart transplant. Gynecology consulted for standard pre transplant workup. Patient has history of premarin replacement therapy for treatment of symptoms of menopause. Total duration of therapy patient estimates is 6 months, stopped two years ago. Denies vaginal bleeding, abnormal vaginal discharge, gynecological complaints.     OB HX:    1 miscarriages    GYN HX:  Denies history of STDs. Not currently sexually active.  Denies history of abnormal paps   was last pap smear, normal  Endorses menopause at age 45. Regular monthly cycles prior to that   was last MMG, normal   Denies any family or personal history of breast, ovarian, or colon cancer    Surgical HX:  S/p tubal ligation    Review of patient's allergies indicates:  No Known Allergies    Past Medical History   Diagnosis Date    Apical mural thrombus 2017    NICM (nonischemic cardiomyopathy) 2017    NICM (nonischemic cardiomyopathy) 2017     History reviewed. No pertinent past surgical history.  History reviewed. No pertinent family history.  Social History   Substance Use Topics    Smoking status: Former Smoker     Packs/day: 0.25     Types: Cigarettes     Start date: 1977     Quit date: 2016    Smokeless tobacco: None    Alcohol use None       Review of Systems:  Cardiovascular: no chest pain or palpitations  Gastrointestinal: no nausea or vomiting, tolerating diet  Genitourinary: no hematuria or dysuria     OBJECTIVE:     Vital Signs (Most Recent)  Temp: 98.1 °F (36.7 °C) (17 0732)  Pulse: (!) 116 (17 1000)  Resp: 18 (17 0732)  BP: (!) 100/59 (17 0732)  SpO2: 97 % (17 0732)    Vital Signs Range (Last 24H):  Temp:   [98 °F (36.7 °C)-98.2 °F (36.8 °C)]   Pulse:  [114-136]   Resp:  [18]   BP: ()/(51-59)   SpO2:  [96 %-98 %]     Temperature Range Min/Max (This Admission):  Temp:  [96.7 °F (35.9 °C)-99.3 °F (37.4 °C)]     Physical Exam:  Abdomen/Rectal: Abdomen: soft, non-tender non-distented; bowel sounds normal; no masses,  no organomegaly. Rectal: not examined  Pelvic:  Atrophic external female genitalia, normal vaginal rugae and tissue, cervix normal in appearance, no cervical tenderness or discharge, no adnexal masses or tenderness, exam mildly obscured mild obesity, no bladder tenderness     Laboratory  none    Diagnostic Results:  none    ASSESSMENT/PLAN:     Active Hospital Problems    Diagnosis  POA    *Acute decompensated heart failure [I50.9]  Yes    Pre-transplant evaluation for heart transplant [Z01.818]  Not Applicable    Type 2 diabetes mellitus without complication [E11.9]  Yes    Organ transplant candidate [Z76.82]  Not Applicable    NICM (nonischemic cardiomyopathy) [I42.8]  Yes      Resolved Hospital Problems    Diagnosis Date Resolved POA   No resolved problems to display.       60 yo with no prior PMH presented with NICM and heart failure currently being evaluated for heart transplant    Pre Transplant Workup  - Normal physical exam  - No personal or family history of CA, no history of abnormal pap smears  - Uterus, cervix and ovaries still present  - Pap smear collected and sent to pathology  - No clinical signs or concern for any current gynecological cancer  - Recommend mammogram as patient has not had one within the past year

## 2017-01-20 NOTE — PROGRESS NOTES
Progress Note  Heart Transplant Service    Admit Date: 1/4/2017   LOS: 16 days     SUBJECTIVE:     Follow up for: Acute decompensated heart failure    Interval History: She reports persistent nausea has improved.  After ambulation to the restroom last night she had an episode of nausea that resolved spontaneously.  Yesterday SVO2 was 53    Scheduled Meds:   cetirizine  5 mg Oral Daily    famotidine  20 mg Oral BID    furosemide  20 mg Oral Daily    magnesium oxide  400 mg Oral BID    senna-docusate 8.6-50 mg  1 tablet Oral BID    sodium chloride 0.9%  10 mL Intravenous Q6H    sodium chloride 0.9%  3 mL Intravenous Q8H     Continuous Infusions:   DOBUTamine 2.5 mcg/kg/min (01/19/17 1635)    heparin (porcine) in 5 % dex 23 Units/kg/hr (01/19/17 1848)     PRN Meds:aluminum & magnesium hydroxide-simethicone, calcium carbonate, diphenhydrAMINE, diphenhydrAMINE-zinc acetate 1-0.1%, heparin (PORCINE), insulin aspart, nitroGLYCERIN, ondansetron, promethazine (PHENERGAN) IVPB, Flushing PICC Protocol **AND** sodium chloride 0.9% **AND** sodium chloride 0.9%    Review of patient's allergies indicates:  No Known Allergies    OBJECTIVE:     Vital Signs (Most Recent)  Temp: 98.3 °F (36.8 °C) (01/19/17 2000)  Pulse: (!) 132 (01/20/17 0700)  Resp: 18 (01/20/17 0400)  BP: (!) 104/57 (01/20/17 0400)  SpO2: 97 % (01/20/17 0400)    Vital Signs Range (Last 24H):  Temp:  [98 °F (36.7 °C)-98.5 °F (36.9 °C)]   Pulse:  [112-137]   Resp:  [18]   BP: ()/(54-60)   SpO2:  [95 %-97 %]     I & O (Last 24H):    Intake/Output Summary (Last 24 hours) at 01/20/17 0723  Last data filed at 01/20/17 0500   Gross per 24 hour   Intake            894.7 ml   Output             1350 ml   Net           -455.3 ml          Telemetry: sinus tach, PVC's  Constitutional: Oriented to person, place, and time. Appears well-developed and well-nourished.   Neck: JVD approx 8 cm H2O  Cardiovascular: Tachycardic rate, regular rhythm and intact distal  pulses.  No murmurs heard, S3   Pulmonary/Chest: Faint crackles at the bases  Abdominal: + BS, exhibits no distension. There is no tenderness. There is no rebound.   Extremities: no cyanosis, clubbing or edema    Labs:     Recent Labs  Lab 01/18/17  0525 01/19/17  0528 01/20/17  0520   WBC 7.98 8.84 8.95   HGB 11.4* 11.8* 11.5*   HCT 35.7* 37.5 36.5*    243 238   LYMPH 33.6  2.7 26.1  2.3 26.8  2.4   MONO 5.8  0.5 7.9  0.7 7.7  0.7   EOSINOPHIL 1.5 1.4 2.1         Recent Labs  Lab 01/18/17  0526 01/19/17  0528 01/20/17  0520   INR 1.1 1.2 1.4*          Recent Labs  Lab 01/18/17  0525 01/19/17  0528 01/19/17  1217 01/19/17  1637 01/20/17  0520   * 125* 132*  --  114*   CALCIUM 9.1 9.5 9.4  --  9.0   ALBUMIN 3.0* 3.3* 3.3*  --  3.1*   PROT 6.7 7.0 7.2  --  6.8    138 138  --  137   K 4.4 4.4 4.4  --  4.2   CO2 25 27 28  --  25    102 102  --  102   BUN 14 12 12  --  12   CREATININE 1.1 1.2 1.3  --  1.2   ALKPHOS 46* 45* 45*  --  45*   ALT 23 20 22  --  17   AST 21 21 21  --  17   BILITOT 0.6 0.6 0.7  --  0.6   MG 1.9 2.0  2.0  --  1.8 2.0   PHOS 4.6* 5.0*  --   --  4.3     Estimated Creatinine Clearance: 63.6 mL/min (based on Cr of 1.2).      Recent Labs  Lab 01/16/17  0303   *       No results for input(s): LDH in the last 168 hours.    Microbiology Results (last 7 days)     Procedure Component Value Units Date/Time    Urine culture [[id number]] Collected:  01/18/17 2223    Order Status:  Completed Specimen:  Urine from Urine, Clean Catch Updated:  01/20/17 0237     Urine Culture, Routine No growth    Blood culture [[id number]] Collected:  01/11/17 2330    Order Status:  Completed Specimen:  Blood Updated:  01/17/17 0612     Blood Culture, Routine No growth after 5 days.            ASSESSMENT:     62 y/o with no PMHx, recently diagnosed with heart failure, [facility] with no evidence of CAD, transferred from [facility] for advanced heart failure  consideration.    PLAN:     Acute on Chronic Combined Systolic and Diastolic Heart Failure, Diagnosed 11/2016 LVEDD 5.6 cm  -NICM, NYHA IV, EF 15-20%, LVEDD: 5.6  -attempted GDMT with Lisinopril and Spironolactone but was d/c'ed 2/2 hypotension  - 2.5 mcg/kg/min, PICC line placed. Unable to titrate GDMT secondary to hypotension  -Lasix started 1/18. Patient net negative 6.62L.  Will continue current regimen  -Continue education & await financial clearance for VAD & OHTx  -Colorectal surgery consulted for colonoscopy; Coumadin on hold and plan for colonoscopy on Monday:1/23  -ID re consulted for positive strongyloides lab  -May have emergent selection mtg on Tuesday to present patient    -2 g Na dietary restriction, 1500 mL fluid restriction, strict I/Os     Chest Pain, resolved  -Suspect it is non-cardiac, she has been chest pain free   -Troponins negative, EKG without ischemic changes  -Will continue to monitor    LV Apical Thrombus  -Heparin infusion and started Coumadin 1/17. INR 1.4 today.  Will stop Coumadin today in anticipation of Colonoscopy on Monday.     -Present on repeat 2D echo 1/14/17    NSVT/VT  -Will monitor closely on Dobutamine    DM  -Appreciate Endocrinology consultation  -HbgA1c: 7.6  -poct glucose AC/HS with low dose correction insulin    Lung Nodule  -found incidentally on CT-repeat CT in 6-12 months     Itching:  -Switch benadryl to zyrtec today with Pepcid  -Dermatology consulted appreciate odilon Christian PA-C  [facility] spectralink: [phone]

## 2017-01-20 NOTE — PROGRESS NOTES
Pt. Experienced a 20 beat run of VT.  On assessment, patient denied palpitations or any other changes.  Patient was about to take a nap.  Notified ROSEANNE Perera.  Received orders to obtain a CMP.  Will continue to monitor.

## 2017-01-20 NOTE — CONSULTS
Consult Note    Inpatient consult to Colorectal Surgery  Consult performed by: MELONY GRAFF  Consult ordered by: FINN OLIVEIRA        SUBJECTIVE:     History of Present Illness:   Patient is a 61 y.o. female y/o with no PMHx, recently diagnosed with heart failure, transferred from [facility] in [place] for advanced heart failure consideration. CRS is consulted as part of the heart transplant/LVAD work up. She has never had a colonoscopy. No family history of CRC. Denies any blood in stool or change in bowel habits.   She is currently on continuous heparin and dobumatine infusions.        Review of patient's allergies indicates:  No Known Allergies  Past Medical History   Diagnosis Date    Apical mural thrombus 01/04/2017    NICM (nonischemic cardiomyopathy) 01/04/2017    NICM (nonischemic cardiomyopathy) 01/04/2017     History reviewed. No pertinent past surgical history.  History reviewed. No pertinent family history.  Social History   Substance Use Topics    Smoking status: Former Smoker     Packs/day: 0.25     Types: Cigarettes     Start date: 1/6/1977     Quit date: 9/6/2016    Smokeless tobacco: None    Alcohol use None     Review of Systems   Constitutional: Positive for malaise/fatigue. Negative for chills and fever.   Respiratory: Negative for cough, shortness of breath and wheezing.    Cardiovascular: Negative for chest pain and palpitations.   Gastrointestinal: Positive for nausea. Negative for abdominal pain, blood in stool, constipation and diarrhea.   Neurological: Positive for weakness.     OBJECTIVE:     Vital Signs:  Temp:  [98.2 °F (36.8 °C)-98.4 °F (36.9 °C)]   Pulse:  [124-136]   Resp:  [18]   BP: ()/(54-57)   SpO2:  [97 %-98 %]     Physical Exam   Constitutional: She is oriented to person, place, and time. She appears well-developed and well-nourished. No distress.   Eyes: Conjunctivae and EOM are normal.   Pulmonary/Chest: Effort normal. No respiratory distress.    Abdominal: Soft. She exhibits no distension. There is no tenderness.   Neurological: She is alert and oriented to person, place, and time.   Skin: Skin is warm and dry.   Psychiatric: She has a normal mood and affect. Her behavior is normal.     Laboratory:  CBC:     Recent Labs  Lab 01/20/17  0520   WBC 8.95   RBC 4.75   HGB 11.5*   HCT 36.5*      MCV 77*   MCH 24.2*   MCHC 31.5*     BMP:     Recent Labs  Lab 01/20/17  0520   *      K 4.2      CO2 25   BUN 12   CREATININE 1.2   CALCIUM 9.0   MG 2.0     CMP:     Recent Labs  Lab 01/20/17  0520   *   CALCIUM 9.0   ALBUMIN 3.1*   PROT 6.8      K 4.2   CO2 25      BUN 12   CREATININE 1.2   ALKPHOS 45*   ALT 17   AST 17   BILITOT 0.6     LFTs:     Recent Labs  Lab 01/20/17  0520   ALT 17   AST 17   ALKPHOS 45*   BILITOT 0.6   PROT 6.8   ALBUMIN 3.1*       ASSESSMENT/PLAN:     61 F undergoing evaluation for heart transplant/LVAS    Plan:  Clear Liquid diet Sunday  Golytely prep   NPO at MN Monday  C-scope 1/23/17  Aurora Morales NP

## 2017-01-20 NOTE — PLAN OF CARE
Problem: Patient Care Overview  Goal: Plan of Care Review  Outcome: Ongoing (interventions implemented as appropriate)  No significant events this shift. Patient free from falls, injury and complaints. Patient heart rate ran high in the 130-140s throughout the shift. Patient asymptomatic. Heparin gtt and dobutamine gtt maintained per MD orders. Will continue to monitor.

## 2017-01-20 NOTE — PT/OT/SLP PROGRESS
"Physical Therapy  Treatment    Vaishnavi Baca   MRN: [id number]   Admitting Diagnosis: Acute decompensated heart failure    PT Received On: 17  PT Start Time: 1121     PT Stop Time: 1130    PT Total Time (min): 9 min       Billable Minutes:  Therapeutic Activity 9    Treatment Type: Treatment  PT/PTA: PT     PTA Visit Number: 0       General Precautions: Standard, fall  Orthopedic Precautions: N/A   Braces: N/A    Do you have any cultural, spiritual, Mosque conflicts, given your current situation?: None    Subjective:  Communicated with RN prior to session.  "I already did my exercises this morning."    Pain Ratin/10 (Pt reports nausea. )  Pain Addressed: Cessation of Activity, Nurse notified    Objective:   Patient found with: telemetry, PICC line    Functional Mobility:  Bed Mobility:   Supine to Sit: Independent  Sit to Supine: Independent    Transfers:  Sit <> Stand Assistance: Independent  Sit <> Stand Assistive Device: No Assistive Device    Gait:   Gait Distance: 88 ft.   Assistance 1: Stand by Assistance  Gait Assistive Device: No device  Gait Pattern: reciprocal  Gait Deviation(s): decreased gurinder, decreased step length  -Ambulation distance limited by increased nausea, requiring return to room.    Balance:   Static Sit: GOOD+: Takes MAXIMAL challenges from all directions.    Dynamic Sit: GOOD+: Maintains balance through MAXIMAL excursions of active trunk motion  Static Stand: GOOD: Takes MODERATE challenges from all directions  Dynamic stand: GOOD-: Needs SUPERVISION only during gait and able to self right with moderate      Therapeutic Activities and Exercises:  Pt with increased nausea, limiting therapy session. Pt positioned in bed with bin nearby in case of emesis.  RN notified of pt's nausea and request for meds.    AM-PAC 6 CLICK MOBILITY  How much help from another person does this patient currently need?   1 = Unable, Total/Dependent Assistance  2 = A lot, Maximum/Moderate " Assistance  3 = A little, Minimum/Contact Guard/Supervision  4 = None, Modified [place]/Independent    Turning over in bed (including adjusting bedclothes, sheets and blankets)?: 4  Sitting down on and standing up from a chair with arms (e.g., wheelchair, bedside commode, etc.): 4  Moving from lying on back to sitting on the side of the bed?: 4  Moving to and from a bed to a chair (including a wheelchair)?: 4  Need to walk in hospital room?: 4  Climbing 3-5 steps with a railing?: 3  Total Score: 23    AM-PAC Raw Score CMS G-Code Modifier Level of Impairment Assistance   6 % Total / Unable   7 - 9 CM 80 - 100% Maximal Assist   10 - 14 CL 60 - 80% Moderate Assist   15 - 19 CK 40 - 60% Moderate Assist   20 - 22 CJ 20 - 40% Minimal Assist   23 CI 1-20% SBA / CGA   24 CH 0% Independent/ Mod I     Patient left supine with all lines intact, call button in reach and RN notified.    Assessment:  Vaishnavi Baca is a 61 y.o. female with a medical diagnosis of Acute decompensated heart failure. Therapy session limited this date 2* nausea. Pt ambulated limited distance in hallway before requiring return to room. RN notified. Pt would continue to benefit from skilled IP PT in order to address current deficits and progress functional mobility.     Rehab identified problem list/impairments: Rehab identified problem list/impairments: weakness, impaired functional mobilty, impaired endurance, gait instability, impaired balance, impaired cardiopulmonary response to activity    Rehab potential is good.    Activity tolerance: Good    Discharge recommendations: Discharge Facility/Level Of Care Needs: home     Barriers to discharge: Barriers to Discharge: Inaccessible home environment    Equipment recommendations: Equipment Needed After Discharge: none     GOALS:   Physical Therapy Goals        Problem: Physical Therapy Goal    Goal Priority Disciplines Outcome Goal Variances Interventions   Physical Therapy Goal     PT/OT,  PT Ongoing (interventions implemented as appropriate)     Description:  Goals to be met by: 2017     Patient will increase functional independence with mobility by performin. Supine to sit with Modified [place] - met  2. Sit to supine with Modified [place] - met  3. Sit to stand transfer with Modified [place] - met   4. Bed to chair transfer with Modified [place] -not met  5. Gait  x 200 feet with Supervision -not met  6. Ascend/descend 5 stairs with no Handrails Stand-by Assistance - not met                   PLAN:    Patient to be seen 3 x/week  to address the above listed problems via gait training, therapeutic activities, therapeutic exercises  Plan of Care expires: 17  Plan of Care reviewed with: patient        Pilar Alban, PT, DPT   2017  [phone]

## 2017-01-20 NOTE — PROCEDURES
Central Line Placement Procedure Note:   Number of lumens: 3  Indication: CVP and SV02  Post procedure diagnosis: same   Estimated blood loss: 5ml   Consent:   Prior to starting the procedure, informed consent was obtained with risks outlined including, but not limited to: pneumothorax, hemothorax, infection, bleeding, and thrombosis.   Prior to starting, all those in the room washed their hands and donned caps and masks, which were worn throughout the procedure. A time-out was performed. The insertion site and surrounding areas were generously scrubbed with chlorhexadine. Using sterile technique, the central line kit was opened and a whole body sterile drape was applied to the patient. The insertion site was anesthetized with lidocaine 1% without epinephrine. The Right Internal Jugular Vein was cannulated and dark red, non-pulsatile blood was aspirated into the syringe. Then, using sterile Seldinger technique, a J-type guidewire was advanced through the needle into the vein without difficulty or resistance. The needle was removed and a small incision was made at the insertion site to allow for advancement of a dilator. After the track was successfully dilated, the catheter was advanced over the guidewire to the skin and the guidewire was removed intact. Dark red blood was aspirated from each of the lumens and sterile saline was injected into each of the ports. The catheter was sewn into place and the site was again cleaned with chlorhexadine. A biopatch was applied to the insertion site and a sterile central line dressing was applied. A chest XRAY was done to confirm catheter placement and absense of pneumothorax/hemothorax. The patient tolerated the procedure well.     Signed:    Louis Jenkins MD  Cardiology Fellow, PGY-5  Pager: [phone]  1/20/2017 5:18 PM

## 2017-01-20 NOTE — PT/OT/SLP DISCHARGE
Physical Therapy Discharge Summary    Vaishnavi Baca  MRN: [id number]   Acute decompensated heart failure   Patient Discharged from acute Physical Therapy on 17.  Please refer to prior PT noted date on 17 for functional status.     Assessment:   Patient was discharge unexpectedly.  Information required to complete and accurate discharge summary is unknown.  Refer to therapy initial evaluation and last progress note for initial and most recent functional status and goal achievement.  Recommendations made may be found in medical record.  GOALS:   Physical Therapy Goals        Problem: Physical Therapy Goal    Goal Priority Disciplines Outcome Goal Variances Interventions   Physical Therapy Goal     PT/OT, PT Ongoing (interventions implemented as appropriate)     Description:  Goals to be met by: 2017     Patient will increase functional independence with mobility by performin. Supine to sit with Modified [place] - met  2. Sit to supine with Modified [place] - met  3. Sit to stand transfer with Modified [place] - met   4. Bed to chair transfer with Modified [place] -not met  5. Gait  x 200 feet with Supervision -not met  6. Ascend/descend 5 stairs with no Handrails Stand-by Assistance - not met                 Reasons for Discontinuation of Therapy Services  Patient is unable to continue work toward goals because of medical or psychosocial complications.      Plan:  Patient Discharged to: [facility].    Pilar Phoenix, PT, DPT   2017  [phone]

## 2017-01-20 NOTE — PROGRESS NOTES
Gave report to XIAO Camejo in [facility].  Transferred patient with charge nurse and PCT via bed with cardiac monitoring maintained  To room 3078.  No signs of distress noted.

## 2017-01-20 NOTE — PROGRESS NOTES
Notified Dr. Prather that patients heart rate has been sustained in the 130-140's SR for a couple of hours. Patient is asymptomatic and sleeping. No further orders received.

## 2017-01-20 NOTE — PLAN OF CARE
Problem: Physical Therapy Goal  Goal: Physical Therapy Goal  Goals to be met by: 2017     Patient will increase functional independence with mobility by performin. Supine to sit with Modified [place] - met  2. Sit to supine with Modified [place] - met  3. Sit to stand transfer with Modified [place] - met   4. Bed to chair transfer with Modified [place] -not met  5. Gait x 200 feet with Supervision -not met  6. Ascend/descend 5 stairs with no Handrails Stand-by Assistance - not met   Outcome: Ongoing (interventions implemented as appropriate)  Goals reviewed and remain appropriate. Pt progressing towards goals.     Pilar Phoenix, PT, DPT   2017  [phone]

## 2017-01-20 NOTE — PLAN OF CARE
Problem: Patient Care Overview  Goal: Plan of Care Review  Outcome: Revised  Plan of care discussed with patient. Patient is free of fall/trauma/injury. Denies CP, SOB, or pain/discomfort. Nausea treated with PRN nausea medication.  All questions addressed. Will continue to monitor

## 2017-01-21 PROBLEM — B78.9 STRONGYLOIDOSIS: Status: ACTIVE | Noted: 2017-01-01

## 2017-01-21 NOTE — ASSESSMENT & PLAN NOTE
Please refer to heart transplant evaluation note on 1/18 for further details.    Vaccines given 1/18/17:  1.Hepatitis B (Rx given for 2nd and 3rd dose)  2.Tdap (given every 10 years)   3.Influenza (given yearly)  4.Prevnar (pneumovax in 8 weeks Rx given)    Based on available information, there are no identified significant barriers to transplantation from an infectious disease standpoint pending acceptable serologies.   Final determination of transplant candidacy will be made once evaluation is complete and reviewed by the Transplant Selection Committee.

## 2017-01-21 NOTE — ASSESSMENT & PLAN NOTE
60 y/o female with advanced heart disease currently being evaluated for heart transplant. 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 Pt denies any out of country travel or out of state living. 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   1. Start ivermectin 200 mc/kg for 2 consecutive days to treat strongyloides. If medication not available this weekend, please give on Monday.

## 2017-01-21 NOTE — SUBJECTIVE & OBJECTIVE
Interval History:   Denies having any fever, chills n/v/d. Having increased work of breathing and fatigue.   HPI:  Vaishnavi Baca is a 61 y.o. year old female with advanced Heart disease currently being evaluated for Heart transplant.  Patient denies any recent fever, chills, or infective illnesses. Per records, patient was treated for PNA 1/1/17 from OSH and was transferred here for heart transplant workup. Pt was given ceftriaxone and azithromycin for 7 days, therapy ended 1/8/17. No fever or chills but reports having fatigue, increase work of breathing and pleuritic CP. Pt was evaluated for heart transplant and now has positive strongyloides. Pt denies having any acute symptoms. Denies any travel and is born and raised in [place]. 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     Review of Systems   Constitutional: Positive for fatigue. Negative for chills and fever.   HENT: Negative for congestion.    Respiratory: Positive for shortness of breath. Negative for cough.    Cardiovascular: Negative for chest pain.   Gastrointestinal: Negative for diarrhea, nausea and vomiting.   Genitourinary: Negative for dysuria.   Musculoskeletal: Negative for back pain.   Neurological: Negative for dizziness and headaches.     Objective:     Vital Signs (Most Recent):  Temp: 99.1 °F (37.3 °C) (01/21/17 0715)  Pulse: (!) 122 (01/21/17 1000)  Resp: 20 (01/21/17 1000)  BP: (!) 99/56 (01/21/17 1000)  SpO2: 97 % (01/21/17 1000) Vital Signs (24h Range):  Temp:  [98.2 °F (36.8 °C)-99.1 °F (37.3 °C)] 99.1 °F (37.3 °C)  Pulse:  [102-140] 122  Resp:  [12-23] 20  SpO2:  [93 %-98 %] 97 %  BP: ()/(52-70) 99/56     Weight: 101.9 kg (224 lb 10.4 oz)  Body mass index is 31.33 kg/(m^2).    Estimated Creatinine Clearance: 64.7 mL/min (based on Cr of 1.2).    Physical Exam   Constitutional: She is oriented to person, place, and time. She appears well-developed and well-nourished. No distress.   HENT:   Head: Normocephalic.   Eyes: Pupils are  equal, round, and reactive to light.   Neck: Normal range of motion.   Cardiovascular: Normal rate, regular rhythm, normal heart sounds and intact distal pulses.  Exam reveals no gallop and no friction rub.    No murmur heard.  Pulmonary/Chest: Effort normal. No respiratory distress. She has no wheezes. She has no rales.   Abdominal: Soft. She exhibits no distension. There is no tenderness. There is no guarding.   Musculoskeletal: She exhibits no edema or deformity.   Neurological: She is alert and oriented to person, place, and time.   Skin: Skin is warm and dry. She is not diaphoretic. No erythema. No pallor.   Psychiatric: She has a normal mood and affect.   Nursing note and vitals reviewed.      Significant Labs:   Blood Culture:     Recent Labs  Lab 01/05/17  0035 01/05/17  0054 01/11/17  2330   LABBLOO No growth after 5 days. No growth after 5 days. No growth after 5 days.     CBC:     Recent Labs  Lab 01/20/17  0520 01/21/17  0506   WBC 8.95 11.50   HGB 11.5* 11.6*   HCT 36.5* 36.0*    251     CMP:     Recent Labs  Lab 01/20/17  0520 01/20/17  1401 01/21/17  0506    135* 135*   K 4.2 4.1 4.8    100 100   CO2 25 26 26   * 173* 140*   BUN 12 12 15   CREATININE 1.2 1.2 1.2   CALCIUM 9.0 8.7 9.1   PROT 6.8 6.8 7.0   ALBUMIN 3.1* 3.1* 3.2*   BILITOT 0.6 0.5 0.7   ALKPHOS 45* 45* 45*   AST 17 18 18   ALT 17 17 14   ANIONGAP 10 9 9   EGFRNONAA 48.9* 48.9* 48.9*     Hepatitis Panel: No results for input(s): HEPBSAG, HEPAIGM, HEPCAB in the last 48 hours.    Invalid input(s): HAPBIGM  HIV 1/2 Antibodies: No results for input(s): [id number] in the last 48 hours.  Quantiferon: No results for input(s): NIL, TBAG, TBAGNIL, MITOGENNIL, TBGOLD in the last 48 hours.  All pertinent labs within the past 24 hours have been reviewed.    Significant Imaging: I have reviewed all pertinent imaging results/findings within the past 24 hours.

## 2017-01-21 NOTE — CONSULTS
ID consult received. Chart being reviewed. Full note with recommendations to follow.  Thank you,  Verna Peña PA-C

## 2017-01-21 NOTE — PROGRESS NOTES
Patient returned from CT scan with no complications. VSS upon transport. Connected to bedside monitor upon return - cardiac monitoring and continuous pulse oximetry applied. Call bell within reach, side rails raised x 2, bed locked and in lowest position.

## 2017-01-21 NOTE — PLAN OF CARE
Problem: Patient Care Overview  Goal: Plan of Care Review  Outcome: Ongoing (interventions implemented as appropriate)  POC reviewed with pt. VS and assessment per flow sheet. Lines intact and patent, heparin and  infusing per orders. No complications. Pain well controlled. No acute events overnight. Will continue to monitor.

## 2017-01-21 NOTE — PROGRESS NOTES
Notified Dr Prather of pt increase in O2 requirement accompanied by right sided pleuritic chest pain. Pt also having nausea with dry heaves. CXR ordered per Dr Prather. Will continue to monitor.

## 2017-01-21 NOTE — PLAN OF CARE
Problem: Pressure Ulcer Risk (Didier Scale) (Adult,Obstetrics,Pediatric)  Goal: Skin Integrity  Patient will demonstrate the desired outcomes by discharge/transition of care.   Outcome: Ongoing (interventions implemented as appropriate)  No acute events since arrival. See vital signs and assessments in flowsheets. See below for updates on today's progress.      Pulmonary: Room air, oxygen saturations %.     Cardiovascular: ST. BP MAP >= 65 mmHg. PVCs noted, no runs of VT noted.     Neurological: RASS 0. Awake, alert, and oriented x 4.     Gastrointestinal: Cardiac diet continued.     Genitourinary: Voids per bedside commode.     Endocrine: Blood glucose monitoring in place, no coverage necessary.     Infusions: Dobutamine, heparin (temporarily on hold)     Central line placed at bedside. Patient progressing towards goals as tolerated, plan of care communicated and reviewed with Vaishnavi Baca and family. All concerns addressed. Will continue to monitor.

## 2017-01-21 NOTE — PROGRESS NOTES
Heart Transplant Progress Note  Attending Physician: Comfort Pereira MD  Hospital Day: 18    Subjective:   Interval History: Overnight, patient transferred to unit for tachycardia and runs of NSVT. Still feeling nauseous today. Hypoxemic.    Medications:   Continuous Infusions:   DOBUTamine 2.5 mcg/kg/min (01/21/17 1200)    heparin (porcine) in 5 % dex 23 Units/kg/hr (01/21/17 1217)       Scheduled Meds:   cetirizine  5 mg Oral Daily    famotidine  20 mg Oral BID    furosemide  20 mg Oral Daily    [START ON 1/22/2017] ivermectin  200 mcg/kg (Dosing Weight) Oral Once    magnesium oxide  400 mg Oral BID    [START ON 1/22/2017] polyethylene glycol  4,000 mL Oral Once    senna-docusate 8.6-50 mg  1 tablet Oral BID    sodium chloride 0.9%  10 mL Intravenous Q6H    sodium chloride 0.9%  3 mL Intravenous Q8H     PRN Meds:aluminum & magnesium hydroxide-simethicone, calcium carbonate, diphenhydrAMINE, diphenhydrAMINE-zinc acetate 1-0.1%, heparin (PORCINE), insulin aspart, nitroGLYCERIN, ondansetron, promethazine (PHENERGAN) IVPB, Flushing PICC Protocol **AND** sodium chloride 0.9% **AND** sodium chloride 0.9%  Objective:     Vitals:  Temp:  [98.2 °F (36.8 °C)-99.1 °F (37.3 °C)]   Pulse:  [102-140]   Resp:  [12-23]   BP: ()/(51-70)   SpO2:  [93 %-98 %]    I/O's:    Intake/Output Summary (Last 24 hours) at 01/21/17 1249  Last data filed at 01/21/17 1234   Gross per 24 hour   Intake          2286.89 ml   Output              700 ml   Net          1586.89 ml      Constitutional: Oriented to person, place, and time. Appears well-developed and well-nourished.   Neck: JVD approx 6 cm H2O  Cardiovascular: Tachycardic rate, regular rhythm and intact distal pulses. No murmurs heard, S3   Pulmonary/Chest: Faint crackles at the bases  Abdominal: + BS, exhibits no distension. There is no tenderness. There is no rebound.   Extremities: no cyanosis, clubbing or edema    Labs:       Recent Labs  Lab 01/20/17  0520  01/20/17  1401 01/21/17  0506    135* 135*   K 4.2 4.1 4.8    100 100   CO2 25 26 26   BUN 12 12 15   CREATININE 1.2 1.2 1.2   * 173* 140*   ANIONGAP 10 9 9       Recent Labs  Lab 01/20/17  0520 01/20/17  1401 01/21/17  0506   AST 17 18 18   ALT 17 17 14   ALKPHOS 45* 45* 45*   BILITOT 0.6 0.5 0.7   ALBUMIN 3.1* 3.1* 3.2*       Recent Labs  Lab 01/20/17  0520 01/20/17  1401  01/21/17  0506   CALCIUM 9.0 8.7  --  9.1   MG 2.0  --   < > 2.4   PHOS 4.3  --   --  4.4   < > = values in this interval not displayed.    Estimated Creatinine Clearance: 64.7 mL/min (based on Cr of 1.2).   Recent Labs  Lab 01/19/17  0528 01/20/17  0520 01/21/17  0506   WBC 8.84 8.95 11.50   HGB 11.8* 11.5* 11.6*   HCT 37.5 36.5* 36.0*    238 251   GRAN 63.9  5.7 62.7  5.6 71.5  8.2*       Recent Labs  Lab 01/18/17  0526 01/19/17  0528 01/20/17  0520   INR 1.1 1.2 1.4*     No results for input(s): LACTATE in the last 168 hours.    Recent Labs  Lab 01/16/17  0303   *            Assessment and Plan:   Acute on Chronic Combined Systolic and Diastolic Heart Failure, Diagnosed 11/2016 LVEDD 5.6 cm  - NICM, NYHA IV, EF 15-20%, LVEDD: 5.6  - attempted GDMT with Lisinopril and Spironolactone but was d/c'ed 2/2 hypotension  -  2.5 mcg/kg/min, PICC line placed. Unable to titrate GDMT secondary to hypotension  - Lasix 30mg PO Daily as patient is euvolemic  - Continue education & await financial clearance for VAD & OHTx  - Colorectal surgery consulted for colonoscopy, may hold off on colonoscopy for now, will reassess patient in AM, may be too sick at this time  - Coumadin on hold in case of colonoscopy on Monday:1/23  - ID re consulted for positive strongyloides lab  - May have emergent selection mtg on Tuesday to present patient   - 2 g Na dietary restriction, 1500 mL fluid restriction, strict I/Os  - KUB to eval nausea    Hypoxemia  - CT chest non-con  - IS  - PT/OT      Chest Pain, resolved  - Suspect it is  non-cardiac, she has been chest pain free   - Troponins negative, EKG without ischemic changes  - Will continue to monitor     LV Apical Thrombus  - Heparin infusion and started Coumadin 1/17. INR 1.4 today. Will stop Coumadin today in anticipation of possible Colonoscopy on Monday.    - Present on repeat 2D echo 1/14/17     NSVT/VT  - Will monitor closely on Dobutamine     DM  - Appreciate Endocrinology consultation  - HbgA1c: 7.6  - poct glucose AC/HS with low dose correction insulin     Lung Nodule  - found incidentally on CT-repeat CT in 6-12 months      Itching:  - Zyrtec  - Dermatology consulted appreciate odilon     Patient discussed and examined with attending physician, Dr. White.      Signed:    Louis Jenkins MD  Cardiology Fellow, PGY-5  Pager: [phone]  1/21/2017 5:10 AM

## 2017-01-21 NOTE — PROGRESS NOTES
[facility]  Infectious Disease  Progress Note    Patient Name: Vaishnavi Baca  MRN: [id number]  Admission Date: 1/4/2017  Length of Stay: 17 days  Attending Physician: Comfort Pereira MD  Primary Care Provider: Primary Doctor No    Isolation Status: No active isolations  Assessment/Plan:      * Strongyloidosis  62 y/o female with advanced heart disease currently being evaluated for heart transplant. 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 Pt denies any out of country travel or out of state living. 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   1. Start ivermectin 200 mc/kg for 2 consecutive days to treat strongyloides. If medication not available this weekend, please give on Monday.       Pre-transplant evaluation for heart transplant  Please refer to heart transplant evaluation note on 1/18 for further details.    Vaccines given 1/18/17:  1.Hepatitis B (Rx given for 2nd and 3rd dose)  2.Tdap (given every 10 years)   3.Influenza (given yearly)  4.Prevnar (pneumovax in 8 weeks Rx given)    Based on available information, there are no identified significant barriers to transplantation from an infectious disease standpoint pending acceptable serologies.   Final determination of transplant candidacy will be made once evaluation is complete and reviewed by the Transplant Selection Committee.    Discussed with ID staff. ID will sign off at this time.     Thank you for your consult. I will sign off. Please contact us if you have any additional questions.   Verna Peña PA-C  Pgr [phone]    Subjective:     Principal Problem:Strongyloidosis    Interval History:   Denies having any fever, chills n/v/d. Having increased work of breathing and fatigue.   HPI:  Vaishnavi Baca is a 61 y.o. year old female with advanced Heart disease currently being evaluated for Heart transplant.  Patient denies any recent fever, chills, or infective illnesses. Per records, patient was  treated for PNA 1/1/17 from OSH and was transferred here for heart transplant workup. Pt was given ceftriaxone and azithromycin for 7 days, therapy ended 1/8/17. No fever or chills but reports having fatigue, increase work of breathing and pleuritic CP. Pt was evaluated for heart transplant and now has positive strongyloides. Pt denies having any acute symptoms. Denies any travel and is born and raised in [place]. 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     Review of Systems   Constitutional: Positive for fatigue. Negative for chills and fever.   HENT: Negative for congestion.    Respiratory: Positive for shortness of breath. Negative for cough.    Cardiovascular: Negative for chest pain.   Gastrointestinal: Negative for diarrhea, nausea and vomiting.   Genitourinary: Negative for dysuria.   Musculoskeletal: Negative for back pain.   Neurological: Negative for dizziness and headaches.     Objective:     Vital Signs (Most Recent):  Temp: 99.1 °F (37.3 °C) (01/21/17 0715)  Pulse: (!) 122 (01/21/17 1000)  Resp: 20 (01/21/17 1000)  BP: (!) 99/56 (01/21/17 1000)  SpO2: 97 % (01/21/17 1000) Vital Signs (24h Range):  Temp:  [98.2 °F (36.8 °C)-99.1 °F (37.3 °C)] 99.1 °F (37.3 °C)  Pulse:  [102-140] 122  Resp:  [12-23] 20  SpO2:  [93 %-98 %] 97 %  BP: ()/(52-70) 99/56     Weight: 101.9 kg (224 lb 10.4 oz)  Body mass index is 31.33 kg/(m^2).    Estimated Creatinine Clearance: 64.7 mL/min (based on Cr of 1.2).    Physical Exam   Constitutional: She is oriented to person, place, and time. She appears well-developed and well-nourished. No distress.   HENT:   Head: Normocephalic.   Eyes: Pupils are equal, round, and reactive to light.   Neck: Normal range of motion.   Cardiovascular: Normal rate, regular rhythm, normal heart sounds and intact distal pulses.  Exam reveals no gallop and no friction rub.    No murmur heard.  Pulmonary/Chest: Effort normal. No respiratory distress. She has no wheezes. She has no rales.    Abdominal: Soft. She exhibits no distension. There is no tenderness. There is no guarding.   Musculoskeletal: She exhibits no edema or deformity.   Neurological: She is alert and oriented to person, place, and time.   Skin: Skin is warm and dry. She is not diaphoretic. No erythema. No pallor.   Psychiatric: She has a normal mood and affect.   Nursing note and vitals reviewed.      Significant Labs:   Blood Culture:     Recent Labs  Lab 01/05/17  0035 01/05/17  0054 01/11/17  2330   LABBLOO No growth after 5 days. No growth after 5 days. No growth after 5 days.     CBC:     Recent Labs  Lab 01/20/17  0520 01/21/17  0506   WBC 8.95 11.50   HGB 11.5* 11.6*   HCT 36.5* 36.0*    251     CMP:     Recent Labs  Lab 01/20/17  0520 01/20/17  1401 01/21/17  0506    135* 135*   K 4.2 4.1 4.8    100 100   CO2 25 26 26   * 173* 140*   BUN 12 12 15   CREATININE 1.2 1.2 1.2   CALCIUM 9.0 8.7 9.1   PROT 6.8 6.8 7.0   ALBUMIN 3.1* 3.1* 3.2*   BILITOT 0.6 0.5 0.7   ALKPHOS 45* 45* 45*   AST 17 18 18   ALT 17 17 14   ANIONGAP 10 9 9   EGFRNONAA 48.9* 48.9* 48.9*     Hepatitis Panel: No results for input(s): HEPBSAG, HEPAIGM, HEPCAB in the last 48 hours.    Invalid input(s): HAPBIGM  HIV 1/2 Antibodies: No results for input(s): [id number] in the last 48 hours.  Quantiferon: No results for input(s): NIL, TBAG, TBAGNIL, MITOGENNIL, TBGOLD in the last 48 hours.  All pertinent labs within the past 24 hours have been reviewed.    Significant Imaging: I have reviewed all pertinent imaging results/findings within the past 24 hours.        Verna Peña PA-C  Infectious Disease  [facility]

## 2017-01-21 NOTE — PLAN OF CARE
Problem: Patient Care Overview  Goal: Plan of Care Review  Outcome: Ongoing (interventions implemented as appropriate)  No acute events throughout day. See vital signs and assessments in flowsheets. See below for updates on today's progress.      Pulmonary: 2L NC, oxygen saturations 93-98%.     Cardiovascular: ST, 120s-140s. MAP >= 60 mmHg.     Neurological: RASS 0. Awake, alert, and oriented x 4.     Gastrointestinal: Cardiac diet, consuming < 25% of meals.      Genitourinary: Urine output 300 mL so far this shift.     Endocrine: Blood glucose monitoring in place, no coverage needed.     Infusions: Dobutamine, heparin.     Intermittent nausea present throughout shift. CT chest performed today. Oxygen weaned slightly. Patient progressing towards goals as tolerated, plan of care communicated and reviewed with Vaishnavi Baca and family. All concerns addressed. Will continue to monitor.

## 2017-01-22 PROBLEM — R91.8 PULMONARY INFILTRATES ON CXR: Status: ACTIVE | Noted: 2017-01-01

## 2017-01-22 NOTE — PROGRESS NOTES
Heart Transplant Progress Note  Attending Physician: Comfort Pereira MD  Hospital Day: 19    Subjective:   Interval History: Patient had CT chest that showed newly developed peripheral infiltrates    Medications:   Continuous Infusions:   DOBUTamine 2.5 mcg/kg/min (01/22/17 1000)    heparin (porcine) in 5 % dex 23 Units/kg/hr (01/22/17 1000)       Scheduled Meds:   cetirizine  5 mg Oral Daily    famotidine  20 mg Oral BID    furosemide  20 mg Oral Daily    ivermectin  200 mcg/kg (Dosing Weight) Oral Once    magnesium oxide  400 mg Oral BID    senna-docusate 8.6-50 mg  1 tablet Oral BID    sodium chloride 0.9%  10 mL Intravenous Q6H     PRN Meds:aluminum & magnesium hydroxide-simethicone, calcium carbonate, diphenhydrAMINE, diphenhydrAMINE-zinc acetate 1-0.1%, heparin (PORCINE), insulin aspart, nitroGLYCERIN, ondansetron, promethazine (PHENERGAN) IVPB, Flushing PICC Protocol **AND** sodium chloride 0.9% **AND** sodium chloride 0.9%  Objective:     Vitals:  Temp:  [98.5 °F (36.9 °C)-99 °F (37.2 °C)]   Pulse:  [115-143]   Resp:  [18-31]   BP: ()/(46-72)   SpO2:  [93 %-97 %]    I/O's:    Intake/Output Summary (Last 24 hours) at 01/22/17 1102  Last data filed at 01/22/17 1000   Gross per 24 hour   Intake           1032.5 ml   Output             1525 ml   Net           -492.5 ml      Constitutional: Oriented to person, place, and time. Appears well-developed and well-nourished.   Neck: Minimal JVD  Cardiovascular: Tachycardic rate, regular rhythm and intact distal pulses. No murmurs heard, S3   Pulmonary/Chest: Faint crackles at the bases  Abdominal: + BS, exhibits no distension. There is no tenderness. There is no rebound.   Extremities: no cyanosis, clubbing or edema    Labs:       Recent Labs  Lab 01/21/17  0506 01/21/17  1818 01/22/17  0754   * 133* 134*   K 4.8 4.5 4.2    99 99   CO2 26 25 28   BUN 15 17 15   CREATININE 1.2 1.2 1.1   * 135* 124*   ANIONGAP 9 9 7*       Recent  Labs  Lab 01/21/17  0506 01/21/17  1818 01/22/17  0754   AST 18 16 14   ALT 14 13 11   ALKPHOS 45* 44* 40*   BILITOT 0.7 0.8 0.9   ALBUMIN 3.2* 3.0* 2.9*       Recent Labs  Lab 01/21/17  0506 01/21/17  1818 01/22/17  0400 01/22/17  0754   CALCIUM 9.1 8.8  --  8.7   MG 2.4 2.0  --  2.0   PHOS 4.4  --  3.9  --        Estimated Creatinine Clearance: 69.1 mL/min (based on Cr of 1.1).   Recent Labs  Lab 01/20/17  0520 01/21/17  0506 01/22/17  0400   WBC 8.95 11.50 9.27   HGB 11.5* 11.6* 10.9*   HCT 36.5* 36.0* 33.8*    251 240   GRAN 62.7  5.6 71.5  8.2* 56.3  5.2       Recent Labs  Lab 01/18/17  0526 01/19/17  0528 01/20/17  0520   INR 1.1 1.2 1.4*     No results for input(s): LACTATE in the last 168 hours.    Recent Labs  Lab 01/16/17  0303   *            Assessment and Plan:   Acute on Chronic Combined Systolic and Diastolic Heart Failure, Diagnosed 11/2016 LVEDD 5.6 cm  - NICM, NYHA IV, EF 15-20%, LVEDD: 5.6  - Will reconsider other causes of NICM  - attempted GDMT with Lisinopril and Spironolactone but was d/c'ed 2/2 hypotension  -  2.5 mcg/kg/min, PICC line placed. Unable to titrate GDMT secondary to hypotension  - Lasix 20mg PO Daily as patient is euvolemic  - Continue education & await financial clearance for VAD & OHTx  - Colorectal surgery consulted for colonoscopy, will hold off on colonoscopy at this time considering patient's state of health  - Coumadin on hold in case of colonoscopy in the future  - ID re consulted for positive strongyloides lab, appreciate recs  - May have emergent selection mtg on Tuesday to present patient   - 2 g Na dietary restriction, 1500 mL fluid restriction, strict I/Os     Hypoxemia  - Pulmonary consult, appreciate recs  - IS  - PT/OT      Chest Pain, resolved  - Suspect it is non-cardiac, she has been chest pain free   - Troponins negative, EKG without ischemic changes  - Will continue to monitor      LV Apical Thrombus  - Heparin infusion and started Coumadin  1/17. INR 1.4 today. Will stop Coumadin today in anticipation of possible Colonoscopy on Monday.   - Present on repeat 2D echo 1/14/17      NSVT/VT  - Will monitor closely on Dobutamine      DM  - Appreciate Endocrinology consultation  - HbgA1c: 7.6  - poct glucose AC/HS with low dose correction insulin      Lung Nodule  - found incidentally on CT-repeat CT in 6-12 months       Itching:  - Zyrtec  - Dermatology consulted, appreciate recs      Patient discussed and examined with attending physician, Dr. White.      Signed:    Louis Jenkins MD  Cardiology Fellow, PGY-5  Pager: [phone]  1/22/2017 5:45 AM

## 2017-01-22 NOTE — PROGRESS NOTES
Patient's HR sustaining in the upper 130s.  12 Lead EKG completed revealing SVT.  Patient symptomatic.  Called [facility] to notify.  No new orders received.  Will continue to monitor closely.

## 2017-01-22 NOTE — PLAN OF CARE
Problem: Patient Care Overview  Goal: Plan of Care Review  Outcome: Ongoing (interventions implemented as appropriate)  No acute events throughout day. See vital signs and assessments in flowsheets. See below for updates on today's progress.      Pulmonary: 2L NC, oxygen saturations 92-97%. Incentive spirometer provided.     Cardiovascular: ST/SVT. MAP >= 60 mmHg. CVP ~ 3 mmHg.     Neurological: RASS 0. Awake, alert, and oriented x 4. Remains fatigued.      Gastrointestinal: Intermittent nausea, relieved by PRN promethazine. Colonoscopy delayed at this time per primary team.     Genitourinary: Voiding spontaneously, urine concentrated.     Endocrine: Blood glucose monitored, no coverage needed.     Infusions: Heparin and dobutamine.     Patient progressing towards goals as tolerated, plan of care communicated and reviewed with Vaishnavi Baca and family. All concerns addressed. Will continue to monitor.

## 2017-01-22 NOTE — PLAN OF CARE
Problem: Patient Care Overview  Goal: Plan of Care Review  Outcome: Ongoing (interventions implemented as appropriate)  Alert and oriented x 4.  VSS.  Patient slept most of the shift.  Dobutamine and heparin gtts infusing into RUE PICC line.  Right IJ TLC intact.  Telemetry monitor - ST/SVT all night.  CVP ranged from 0-6.  Patient denied pain all night.  Reported some indigestion around 0450 - resolved with 1 tums.  No acute events overnight.      Problem: Fall Risk (Adult)  Goal: Identify Related Risk Factors and Signs and Symptoms  Related risk factors and signs and symptoms are identified upon initiation of Human Response Clinical Practice Guideline (CPG)   Outcome: Ongoing (interventions implemented as appropriate)  No falls this shift.  Patient OOB once to the BSC.  Non-skid socks for safety.

## 2017-01-22 NOTE — PT/OT/SLP RE-EVAL
Physical Therapy  Re-evaluation    Vaishnavi Baca   MRN: [id number]   Admitting Diagnosis: Strongyloidosis    PT Received On: 17  PT Start Time: 1315     PT Stop Time: 1331    PT Total Time (min): 16 min       Billable Minutes:  Re-eval 16    Diagnosis: Strongyloidosis      Past Medical History   Diagnosis Date    Apical mural thrombus 2017    NICM (nonischemic cardiomyopathy) 2017    NICM (nonischemic cardiomyopathy) 2017      History reviewed. No pertinent past surgical history.    Referring physician: Dr. Rivera  Date referred to PT: 17    General Precautions: Standard, fall  Orthopedic Precautions: N/A   Braces: N/A       Do you have any cultural, spiritual, [employer] conflicts, given your current situation?: None    Patient History:  Lives With: spouse  Living Arrangements: house  Home Accessibility: stairs to enter home  Number of Stairs to Enter Home: 5  Stair Railings at Home: none  Transportation Available: family or friend will provide, car  Living Environment Comment: Pt lives with her  in a [facility] with 5 [address]. She works full time as an assistant warden and was independent with all mobility.   Equipment Currently Used at Home: none  DME owned (not currently used): none    Previous Level of Function:  Ambulation Skills: independent  Transfer Skills: independent  ADL Skills: independent  Work/Leisure Activity: independent    Subjective:  Communicated with RN prior to session.  Pt nauseous, however agreeable to working with PT.  Chief Complaint: nausea  Patient goals: return home     Pain Ratin/10 (nausea)               Pain Rating Post-Intervention: 0/10    Objective:   Patient found with: peripheral IV, telemetry, pulse ox (continuous), central line     Cognitive Exam:  Oriented to: Person, Place, Time and Situation    Follows Commands/attention: Follows multistep  commands  Communication: clear/fluent  Safety awareness/insight to disability: intact    Physical  Exam:  Postural examination/scapula alignment: Rounded shoulder and Head forward    Skin integrity: Visible skin intact  Edema: None noted in BLE/BUE    Sensation:   Intact    Upper Extremity Range of Motion:  Right Upper Extremity: WFL  Left Upper Extremity: WFL    Upper Extremity Strength:  Right Upper Extremity: WFL  Left Upper Extremity: WFL    Lower Extremity Range of Motion:  Right Lower Extremity: WFL  Left Lower Extremity: WFL    Lower Extremity Strength:  Right Lower Extremity: WFL  Left Lower Extremity: WFL     Fine motor coordination:  Intact    Gross motor coordination: WFL    Functional Mobility:  Bed Mobility:  Rolling/Turning to Left: Stand by assistance  Rolling/Turning Right: Stand by assistance  Scooting/Bridging: Stand by Assistance  Supine to Sit: Stand by Assistance  Sit to Supine: Stand by Assistance    Transfers:  Sit <> Stand Assistance: Stand By Assistance  Sit <> Stand Assistive Device: No Assistive Device    Gait:   Gait Distance: x8 feet. Gait limited 2/2 to nausea.  Assistance 1: Contact Guard Assistance  Gait Assistive Device: No device  Gait Pattern: 2-point gait  Gait Deviation(s): decreased gurinder, increased time in double stance, decreased velocity of limb motion      Balance:   Static Sit: GOOD-: Takes MODERATE challenges from all directions but inconsistently  Dynamic Sit: GOOD-: Maintains balance through MODERATE excursions of active trunk movement,     Static Stand: FAIR+: Takes MINIMAL challenges from all directions  Dynamic stand: FAIR: Needs CONTACT GUARD during gait    Therapeutic Activities and Exercises:  Patient educated on role of PT and safety with mobility. Patient verbalized and demonstrated understanding of safety precautions with mobility. Pt educated on importance of OOB activites and potential complications with immobility.     AM-PAC 6 CLICK MOBILITY  How much help from another person does this patient currently need?   1 = Unable, Total/Dependent Assistance  2  = A lot, Maximum/Moderate Assistance  3 = A little, Minimum/Contact Guard/Supervision  4 = None, Modified [place]/Independent    Turning over in bed (including adjusting bedclothes, sheets and blankets)?: 4  Sitting down on and standing up from a chair with arms (e.g., wheelchair, bedside commode, etc.): 4  Moving from lying on back to sitting on the side of the bed?: 4  Moving to and from a bed to a chair (including a wheelchair)?: 3  Need to walk in hospital room?: 3  Climbing 3-5 steps with a railing?: 3  Total Score: 21     AM-PAC Raw Score CMS G-Code Modifier Level of Impairment Assistance   6 % Total / Unable   7 - 9 CM 80 - 100% Maximal Assist   10 - 14 CL 60 - 80% Moderate Assist   15 - 19 CK 40 - 60% Moderate Assist   20 - 22 CJ 20 - 40% Minimal Assist   23 CI 1-20% SBA / CGA   24 CH 0% Independent/ Mod I     Patient left supine with all lines intact, call button in reach and RN notified.    Assessment:   Vaishnavi Baca is a 61 y.o. female with a medical diagnosis of Strongyloidosis and presents with decreased functional mobility due to impaired endurance and weakness throughout.  Patient would benefit from skilled PT in the acute care setting to improve the limitations listed below. Patient would be appropriate for home no needs upon discharge.      Rehab identified problem list/impairments: Rehab identified problem list/impairments: weakness, impaired endurance, impaired balance, gait instability    Rehab potential is good.    Activity tolerance: Fair    Discharge recommendations: Discharge Facility/Level Of Care Needs: home     Barriers to discharge: Barriers to Discharge: Inaccessible home environment    Equipment recommendations: Equipment Needed After Discharge: none     GOALS:   Physical Therapy Goals        Problem: Physical Therapy Goal    Goal Priority Disciplines Outcome Goal Variances Interventions   Physical Therapy Goal     PT/OT, PT Ongoing (interventions implemented as  appropriate)     Description:  Goals to be met by: 2017     Patient will increase functional independence with mobility by performin. Supine to sit with Modified [place] -not  met  2. Sit to supine with Modified [place] - met  3. Sit to stand transfer with Modified [place] - not  4. Bed to chair transfer with Modified [place] -not met  5. Gait  x 200 feet with Supervision -not met  6. Ascend/descend 5 stairs with no Handrails Stand-by Assistance - not met                    PLAN:    Patient to be seen 3 x/week to address the above listed problems via gait training, therapeutic activities, therapeutic exercises  Plan of Care expires: 17  Plan of Care reviewed with: patient          Alexander YULISSA Ki, PT  2017

## 2017-01-22 NOTE — CONSULTS
Pulmonary & Critical Care Medicine   Consultation Note    Reason for Consultation: hypoxemia    HPI: Pt is a 62 y/o F with NICM with EF 10%, LV thrombus, DM who was previously well until the past 6 weeks. She presented to an OSH with viral symptoms and was then noted to have new diagnosis HF. She was transferred here for transplant evaluation. She was noted to have NSVT on the floor yesterday and was transferred to the ICU for close monitoring and care. We are consulted for hypoxia she experienced yesterday, though as per documentation she was on 2L NC sat'ing 95-98%. She denies any history of lung issues. 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 Being tx for strongiloides due to testing positive.Denies dyspnea, chest pain, cough or sputum production.    Image review: OSH CTA- neg for PE, atelectasis on the right base with small pleural effusion and small opacification in the right upper lobe.    CT chest 1/12: Subsegmental area of consolidation within the right middle and lower lobes concerning for aspiration or pneumonia.  Recommend followup examination in 4-6 weeks to ensure resolution.    7 mm solid pulmonary nodule abutting the minor fissure.    CT chest 1/21: Interval development of pulmonary infiltration in the superior segment of the LEFT lower lobe, as well as an interval increase in a pleural-based infiltration in the anterior segment of the RIGHT upper lobe, which now obscures the previously identified 0.7 cm pulmonary nodule.    Interval posterior pleural thickening with small bilateral pleural effusions, RIGHT greater than LEFT and associated compressive atelectasis.    Stable, subsegmental consolidation in the RIGHT middle/lower lobe when compared to previous CT chest/abdomen 01/12/2017.          Past Medical History   Diagnosis Date    Apical mural thrombus 01/04/2017    NICM (nonischemic cardiomyopathy) 01/04/2017    NICM (nonischemic cardiomyopathy) 01/04/2017     History reviewed. No pertinent past  surgical history.  Social History:   Social History     Social History    Marital status:      Spouse name: N/A    Number of children: N/A    Years of education: N/A     Occupational History    Not on file.     Social History Main Topics    Smoking status: Former Smoker     Packs/day: 0.25     Types: Cigarettes     Start date: 1/6/1977     Quit date: 9/6/2016    Smokeless tobacco: Not on file    Alcohol use Not on file    Drug use: Not on file    Sexual activity: Not on file     Other Topics Concern    Not on file     Social History Narrative     History reviewed. No pertinent family history.  Drug Allergies:   Review of patient's allergies indicates:  No Known Allergies  Current Infusions:   DOBUTamine 2.5 mcg/kg/min (01/22/17 0600)    heparin (porcine) in 5 % dex 22.976 Units/kg/hr (01/22/17 0600)     Scheduled Medications:     cetirizine  5 mg Oral Daily    famotidine  20 mg Oral BID    furosemide  20 mg Oral Daily    ivermectin  200 mcg/kg (Dosing Weight) Oral Once    magnesium oxide  400 mg Oral BID    polyethylene glycol  4,000 mL Oral Once    senna-docusate 8.6-50 mg  1 tablet Oral BID    sodium chloride 0.9%  10 mL Intravenous Q6H     PRN Medications:   aluminum & magnesium hydroxide-simethicone, calcium carbonate, diphenhydrAMINE, diphenhydrAMINE-zinc acetate 1-0.1%, heparin (PORCINE), insulin aspart, nitroGLYCERIN, ondansetron, promethazine (PHENERGAN) IVPB, Flushing PICC Protocol **AND** sodium chloride 0.9% **AND** sodium chloride 0.9%    Review of Systems:   A comprehensive 12-point review of systems was performed, and is negative except for those items mentioned above in the HPI section of this note.     Vital Signs:    Vitals:    01/22/17 0600   BP: (!) 101/59   Pulse: (!) 123   Resp: (!) 31   Temp:        Fluid Balance:     Intake/Output Summary (Last 24 hours) at 01/22/17 0721  Last data filed at 01/22/17 0600   Gross per 24 hour   Intake           1272.5 ml   Output               975 ml   Net            297.5 ml       Physical Exam:   General: NAD, cooperative & interactive.  HEENT: AT/NC, PERRL, EOMI, nasal and oral mucosa moist. Normal dentition. Oropharynx without exudate.   Neck: Supple without JVD. Trachea midline. Thyroid feels normal.   Cardiac: RRR with no MRG with brisk cap refill and symmetric pulses in distal extremities.  Respiratory: Normal inspection. Symmetric chest rise.  Auscultation clear bilaterally. No increased work of breathing noted.   Abdomen: Soft, NT/ND. +BS. No palpable masses. No hepatosplenomegaly.   Extremities: Normal strength and tone (grossly). No visible atrophy. No clubbing or cyanosis on nail exam.   Skin: Warm and dry without visible rash.   Neuro: Grossly intact to brief exam. Oriented with appropriate mood & affect.     Personal Review and Summary of Prior Diagnostics    Laboratory Studies:     Recent Labs  Lab 01/22/17  0615   PH 7.401   PCO2 45.4*   PO2 32*   HCO3 28.2*   POCSATURATED 60*   BE 3       Recent Labs  Lab 01/22/17  0400   WBC 9.27   RBC 4.50   HGB 10.9*   HCT 33.8*      MCV 75*   MCH 24.2*   MCHC 32.2       Recent Labs  Lab 01/21/17  1818   *   K 4.5   CL 99   CO2 25   BUN 17   CREATININE 1.2   MG 2.0       Microbiology Data:   Microbiology Results (last 7 days)     Procedure Component Value Units Date/Time    Urine culture [[id number]] Collected:  01/18/17 2223    Order Status:  Completed Specimen:  Urine from Urine, Clean Catch Updated:  01/20/17 0237     Urine Culture, Routine No growth    Blood culture [[id number]] Collected:  01/11/17 2330    Order Status:  Completed Specimen:  Blood Updated:  01/17/17 0612     Blood Culture, Routine No growth after 5 days.        Summary of Chest Imaging Personally Reviewed: See above in HPI    2D Echo: 1/14/17  CONCLUSIONS     1 - Eccentric hypertrophy.     2 - Severely depressed left ventricular systolic function (EF 10-15%).     3 - Biatrial enlargement.     4 - Right  ventricular enlargement with severely depressed systolic function.     5 - The estimated PA systolic pressure is 22 mmHg.     6 - Mild mitral regurgitation.     7 - Mild tricuspid regurgitation.     8 - Low central venous pressure.     9 - There is thrombus in the cardiac apex.     PFT's: 1/16/17:  INTERPRETATIONS:  Normal spirometry. Nitrogen washout lung volumes are normal. Normal diffusion  capacity. Normal MVV. Oximetry is normal.    Impression & Recommendations    Pt is a 60 y/o F with NICM with EF 10%, LV thrombus, DM, NSVT who is here undergoing workup for heart transplant with an abnormal CT scan      - image review reveals migrating infiltrates which appear to be non-infectious due to course and her clinical picture.  - unclear etiology of NICM though does not seem to be any clear correlation between the two.  - will obtain EMMANUEL, ESR, CRP already done, ANCA, MPO  - this is likely a non-infectious inflammatory process ()  - will discuss trial of steroids once labs result.  - regarding hypoxia- took oxygen off this AM and is maintaining sats >90%; she may need oxygen with ambulation which is fine.    Will follow, thank you for the consult.      Chantell Hernandez MD  Pulmonary/Critical Care Fellow  [phone]

## 2017-01-22 NOTE — PLAN OF CARE
Problem: Physical Therapy Goal  Goal: Physical Therapy Goal  Goals to be met by: 2017     Patient will increase functional independence with mobility by performin. Supine to sit with Modified [place] -not met  2. Sit to supine with Modified [place] - met  3. Sit to stand transfer with Modified [place] - not  4. Bed to chair transfer with Modified [place] -not met  5. Gait x 200 feet with Supervision -not met  6. Ascend/descend 5 stairs with no Handrails Stand-by Assistance - not met   Outcome: Ongoing (interventions implemented as appropriate)  Pt goals appropriate.

## 2017-01-23 NOTE — PLAN OF CARE
Problem: Patient Care Overview  Goal: Plan of Care Review  Outcome: Ongoing (interventions implemented as appropriate)  Alert and oriented x 4.  VSS. Patient awake most of the shift - unable to sleep.   Dobutamine and heparin gtts infusing into RUE PICC line. Right IJ TLC intact. Telemetry monitor - ST/SVT all night.  CVP ranged from 2-3.  Patient denied pain all night. Reported some indigestion this AM - resolved with 1 tums.  Voided in the BSC without difficulty.    @ bedside all shift.  No acute events overnight.

## 2017-01-23 NOTE — PROGRESS NOTES
Pulmonary & Critical Care Medicine   Progress Note    Reason for Consultation: hypoxemia    Interval Hx: NAEON. Patient saturating in 90s on RA. Denies any SOB, cough, or chest pain.     Past Medical History   Diagnosis Date    Apical mural thrombus 01/04/2017    NICM (nonischemic cardiomyopathy) 01/04/2017    NICM (nonischemic cardiomyopathy) 01/04/2017     History reviewed. No pertinent past surgical history.  Social History:   Social History     Social History    Marital status:      Spouse name: N/A    Number of children: N/A    Years of education: N/A     Occupational History    Not on file.     Social History Main Topics    Smoking status: Former Smoker     Packs/day: 0.25     Types: Cigarettes     Start date: 1/6/1977     Quit date: 9/6/2016    Smokeless tobacco: Not on file    Alcohol use Not on file    Drug use: Not on file    Sexual activity: Not on file     Other Topics Concern    Not on file     Social History Narrative     History reviewed. No pertinent family history.  Drug Allergies:   Review of patient's allergies indicates:  No Known Allergies  Current Infusions:   DOBUTamine 2.5 mcg/kg/min (01/23/17 0900)    heparin (porcine) in 5 % dex 23 Units/kg/hr (01/23/17 0900)     Scheduled Medications:     cetirizine  5 mg Oral Daily    famotidine  20 mg Oral BID    furosemide  20 mg Oral Daily    magnesium oxide  400 mg Oral BID    senna-docusate 8.6-50 mg  1 tablet Oral BID    sodium chloride 0.9%  10 mL Intravenous Q6H     PRN Medications:   aluminum & magnesium hydroxide-simethicone, calcium carbonate, diphenhydrAMINE, diphenhydrAMINE-zinc acetate 1-0.1%, heparin (PORCINE), insulin aspart, nitroGLYCERIN, ondansetron, promethazine (PHENERGAN) IVPB, Flushing PICC Protocol **AND** sodium chloride 0.9% **AND** sodium chloride 0.9%    Review of Systems:   A comprehensive 12-point review of systems was performed, and is negative except for those items mentioned above in the HPI  section of this note.     Vital Signs:    Vitals:    01/23/17 0900   BP: (!) 94/52   Pulse: (!) 131   Resp: (!) 57   Temp:        Fluid Balance:     Intake/Output Summary (Last 24 hours) at 01/23/17 0943  Last data filed at 01/23/17 0900   Gross per 24 hour   Intake           1207.5 ml   Output             1500 ml   Net           -292.5 ml       Physical Exam:   General: NAD, cooperative & interactive.  HEENT: AT/NC, EOMI  Neck: Supple, no JVD, no masses or nodularities  Cardiac: Tachycardic in 130s; no murmurs appreciated  Respiratory: Normal inspection. Symmetric chest rise.  Auscultation clear bilaterally. No increased work of breathing noted.   Abdomen: Soft, NT/ND. +BS  Extremities: No visible atrophy. No clubbing or cyanosis on nail exam.    Skin: Warm and dry without visible rash.       Personal Review and Summary of Prior Diagnostics    Laboratory Studies:   No results for input(s): PH, PCO2, PO2, HCO3, POCSATURATED, BE in the last 24 hours.    Recent Labs  Lab 01/23/17  0340   WBC 9.83   RBC 4.32   HGB 10.3*   HCT 32.0*      MCV 74*   MCH 23.8*   MCHC 32.2       Recent Labs  Lab 01/23/17  0833     136   K 4.0  4.0     100   CO2 28  28   BUN 16  16   CREATININE 1.0  1.0   MG 2.0       Microbiology Data:   Microbiology Results (last 7 days)     Procedure Component Value Units Date/Time    Urine culture [[id number]] Collected:  01/18/17 2223    Order Status:  Completed Specimen:  Urine from Urine, Clean Catch Updated:  01/20/17 0237     Urine Culture, Routine No growth    Blood culture [[id number]] Collected:  01/11/17 2330    Order Status:  Completed Specimen:  Blood Updated:  01/17/17 0612     Blood Culture, Routine No growth after 5 days.        Summary of Chest Imaging Personally Reviewed: See above in HPI    2D Echo: 1/14/17  CONCLUSIONS     1 - Eccentric hypertrophy.     2 - Severely depressed left ventricular systolic function (EF 10-15%).     3 - Biatrial enlargement.     4 -  Right ventricular enlargement with severely depressed systolic function.     5 - The estimated PA systolic pressure is 22 mmHg.     6 - Mild mitral regurgitation.     7 - Mild tricuspid regurgitation.     8 - Low central venous pressure.     9 - There is thrombus in the cardiac apex.     PFT's: 1/16/17:  INTERPRETATIONS:  Normal spirometry. Nitrogen washout lung volumes are normal. Normal diffusion  capacity. Normal MVV. Oximetry is normal.    Impression & Recommendations    Pt is a 62 y/o F with NICM with EF 10%, LV thrombus, DM, NSVT who is here undergoing workup for heart transplant with an abnormal CT scan    - image review reveals migrating infiltrates which appear to be non-infectious due to course and her clinical picture.  - unclear etiology of NICM though does not seem to be any clear correlation between the two.  - ESR 49, CRP uptrending 15-->21-->136 today  - EMMANUEL, ANCA, MPO pending  - this is likely a non-infectious inflammatory process () and patient may require bronchoscopy in the near future if labs unrevealing  - will discuss trial of steroids once labs result, but for now hold off as there is concern if there is any active strongyloides (although unlikely)  - Will explore other diagnosis such as amyloid given cardiac history, although there is no evidence of amyloid on Echo (definitive diagnosis would require cardiac biopsy)  - Saturating well on RA, may require O2 w/ ambulation    Will follow, thank you for the consult.      Bethany Tuttle MD  PGY1

## 2017-01-23 NOTE — PROGRESS NOTES
Pt asleep.   AAAO. He verbalized concerns that he thinks his wife is deteriorating. Listened and offered emotional support.  HTS team about to see pt in a few minutes.  I suggested he talk with them about this when they come in. He said he will, he is just concerned.  No questions at this time regarding VAD.  WIll follow up with him tomorrow.

## 2017-01-23 NOTE — PLAN OF CARE
Problem: Patient Care Overview  Goal: Individualization & Mutuality  Outcome: Ongoing (interventions implemented as appropriate)  No acute changes this shift, VSS. UOP adequate. EP physicians consulted about patients heart rate/rhythm today. POC to continue supportive care and for patient have a bronchoscopy and a colonoscopy at some point. POC discussed with patient and family. Questions answered, concerns addressed. Will continue to monitor closely.

## 2017-01-23 NOTE — NURSING
ROSEANNE Christian called to update on patients hemodynamics. Patient hypotensive 79/53 (62) and hr in 140s. PA stated she would speak with fellow physician and follow-up. Will await orders and continue to monitor.       1730 update: MIGUEL Christian spoke with Dr. Moore and updated this RN. Patients blood pressure is now 89/58 (68) and PA stated that if patient became hypotensive again, as long as she is asymptomatic, simply monitor her.

## 2017-01-23 NOTE — MEDICAL/APP STUDENT
Progress Note  Hospital Medicine    Patient Name: Vaishnavi Baca  YOB: 1955    Admit Date: 1/4/2017                     LOS: 19    SUBJECTIVE:     Reason for Admission:  Acute decompensated heart failure  See H&P for detailed presentating history and ROS.      Interval history: No complaints overnight, No SOB on room air.   -Results for EMMANUEL, ANCA and MPO still pending, ESR 49,        OBJECTIVE:     Vital Signs Range (Last 24H):  Temp:  [98.2 °F (36.8 °C)-99.5 °F (37.5 °C)]   Pulse:  [112-139]   Resp:  [13-57]   BP: ()/(51-66)   SpO2:  [90 %-98 %] Body mass index is 28.47 kg/(m^2).  Wt Readings from Last 1 Encounters:   01/23/17 0400 92.6 kg (204 lb 2.3 oz)   01/22/17 0500 97.6 kg (215 lb 2.7 oz)   01/21/17 0300 101.9 kg (224 lb 10.4 oz)   01/20/17 1600 101.3 kg (223 lb 5.2 oz)   01/20/17 0500 98.3 kg (216 lb 11.4 oz)   01/19/17 0500 98.2 kg (216 lb 7.9 oz)   01/18/17 0545 99.3 kg (218 lb 14.7 oz)   01/17/17 0422 99.4 kg (219 lb 2.2 oz)   01/16/17 0400 99.8 kg (220 lb 0.3 oz)   01/15/17 0600 100 kg (220 lb 7.4 oz)   01/14/17 0410 100.4 kg (221 lb 5.5 oz)   01/13/17 0300 99.7 kg (219 lb 12.8 oz)   01/12/17 0652 99.2 kg (218 lb 11.1 oz)   01/11/17 0600 98.6 kg (217 lb 6 oz)   01/10/17 0500 98.7 kg (217 lb 9.5 oz)   01/09/17 0717 98.8 kg (217 lb 13 oz)   01/08/17 0641 97.3 kg (214 lb 8.1 oz)   01/04/17 2234 109.3 kg (240 lb 15.4 oz)       I & O (Last 24H):  Intake/Output Summary (Last 24 hours) at 01/23/17 0943  Last data filed at 01/23/17 0900   Gross per 24 hour   Intake           1207.5 ml   Output             1500 ml   Net           -292.5 ml       Physical Exam:  General: well developed, well nourished, no distress  HENT: Head:normocephalic, atraumatic.   Lungs:  clear to auscultation bilaterally and normal respiratory effort  Cardiovascular: Heart: regular rate and rhythm, S1, S2 normal, no murmur, click, rub or gallop. Chest Wall: no tenderness. Extremities: no cyanosis or edema, or  clubbing. Pulses: 2+ and symmetric.  Abdomen/Rectal: Abdomen: soft, non-tender non-distented; bowel sounds normal; no masses,  no organomegaly. Skin: Skin color, texture, turgor normal. No rashes or lesions  Musculoskeletal:no clubbing, cyanosis  Neurologic: Normal strength and tone. No focal numbness or weakness  Psych/Behavioral:  Normal. and Alert and oriented, appropriate affect.    Diagnostic Results:  Lab Results   Component Value Date    WBC 9.83 01/23/2017    HGB 10.3 (L) 01/23/2017    HCT 32.0 (L) 01/23/2017    MCV 74 (L) 01/23/2017     01/23/2017       Recent Labs  Lab 01/23/17  0833   *  117*     136   K 4.0  4.0     100   CO2 28  28   BUN 16  16   CREATININE 1.0  1.0   CALCIUM 8.7  8.7   MG 2.0     Lab Results   Component Value Date    INR 1.4 (H) 01/20/2017    INR 1.2 01/19/2017    INR 1.1 01/18/2017     Lab Results   Component Value Date    HGBA1C 7.6 (H) 01/12/2017     Recent Labs      01/20/17   2110  01/21/17   0743  01/21/17   1637  01/21/17   2256  01/22/17   0754  01/22/17   1221  01/22/17   1721  01/22/17   2112   POCTGLUCOSE  115*  150*  118*  100  125*  121*  118*  159*     Chest CT 1/21-   Interval development of pulmonary infiltration in the superior segment of the LEFT lower lobe, as well as an interval increase in a pleural-based infiltration in the anterior segment of the RIGHT upper lobe, which now obscures the previously identified 0.7 cm pulmonary nodule.    Interval posterior pleural thickening with small bilateral pleural effusions, RIGHT greater than LEFT and associated compressive atelectasis.    Stable, subsegmental consolidation in the RIGHT middle/lower lobe when compared to previous CT chest/abdomen 01/12/2017.    Stable, previously identified simple LEFT renal cysts.    CXR 1/21-   Mild progression of patchy alveolar filling at the right mid to lower lung zone laterally  ASSESSMENT/PLAN:     Active Hospital Problems    Diagnosis  POA     *Acute decompensated heart failure [I50.9]  Yes    Pulmonary infiltrates on CXR [R91.8]  Yes    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]  Unknown    NSVT (nonsustained ventricular tachycardia) [I47.2]  Yes    Chest pain [R07.9]  Yes    Fatigue [R53.83]  Yes    Palliative care encounter [Z51.5]  Not Applicable    Goals of care, counseling/discussion [Z71.89]  Not Applicable    Pre-transplant evaluation for heart transplant [Z01.818]  Not Applicable    Type 2 diabetes mellitus without complication [E11.9]  Yes    Organ transplant candidate [Z76.82]  Not Applicable    NICM (nonischemic cardiomyopathy) [I42.8]  Yes      Resolved Hospital Problems    Diagnosis Date Resolved POA   No resolved problems to display.     Problems Addressed Today:  A: Pt is a 60 y/o F with NICM with EF 10%, LV thrombus, DM, NSVT who is here undergoing workup for heart transplant with an abnormal CT scan    P:   - EMMANUEL, ANCA, MPO still pending, will follow, discuss steroids once results received   - , ESR 49  - No findings to suggest amyloidosis, likely a non-infectious inflammatory process ()  - no room air, O2 sats at 96%         DISCHARGE PLANNING:    TIME SPENT: I spent 10 minutes evaluating, treating, counseling, and coordinating care for the patient.    Signing Physician:  Johann Carlos

## 2017-01-23 NOTE — CONSULTS
Cardiac Electrophysiology Consult Note    Reason for Consultation: Atrial tachycardia  Attending Requesting: Nohelia  SUBJECTIVE:     History of Present Illness:  Patient is a 61 y.o. female with newly diagnosed NICM (likely viral CM) LVEF 10-15% presented to [facility] for advanced therapy evaluation. Remains to get colonoscopy and bronch. Noted to be persistently tachycardic.  She is primarily in sinus tachycardia and then goes into SVT with rates 130-140. On our visit, she broke easily with carotid massage, asymptomatic during episode.    Review of patient's allergies indicates:  No Known Allergies    Past Medical History   Diagnosis Date    Apical mural thrombus 01/04/2017    NICM (nonischemic cardiomyopathy) 01/04/2017    NICM (nonischemic cardiomyopathy) 01/04/2017     History reviewed. No pertinent past surgical history.  History reviewed. No pertinent family history.  Social History   Substance Use Topics    Smoking status: Former Smoker     Packs/day: 0.25     Types: Cigarettes     Start date: 1/6/1977     Quit date: 9/6/2016    Smokeless tobacco: None    Alcohol use None      Current Facility-Administered Medications   Medication Dose Route Frequency Provider Last Rate Last Dose    aluminum & magnesium hydroxide-simethicone 400-400-40 mg/5 mL suspension 30 mL  30 mL Oral Q6H PRN Ross Prather MD   30 mL at 01/16/17 0318    calcium carbonate 200 mg calcium (500 mg) chewable tablet 500 mg  500 mg Oral Daily PRN Louis Jenkins MD   500 mg at 01/23/17 0440    cetirizine tablet 5 mg  5 mg Oral Daily Clovis Jimenez MD   5 mg at 01/23/17 1039    diphenhydrAMINE capsule 25 mg  25 mg Oral Q6H PRN Clovis Jimenez MD   25 mg at 01/20/17 2304    diphenhydrAMINE-zinc acetate 1-0.1% cream   Topical TID PRN Ross Prather MD        DOBUTamine 500mg in D5W 250mL infusion (premix) (NON-TITRATING)  2.5 mcg/kg/min (Dosing Weight) Intravenous Continuous Louis Jenkins MD 8.2 mL/hr at  01/23/17 1400 2.5 mcg/kg/min at 01/23/17 1400    famotidine tablet 20 mg  20 mg Oral BID Clovis Jimenez MD   20 mg at 01/23/17 0843    furosemide tablet 20 mg  20 mg Oral Daily Clovis Jimenez MD   Stopped at 01/23/17 0900    heparin 25,000 units in dextrose 5% 250 mL (100 units/mL) bolus from bag; ADDITIONAL PRN BOLUS  15 Units/kg (Order-Specific) Intravenous PRN Louise Monroy PA-C   1,260 Units at 01/19/17 0958    heparin 25,000 units in dextrose 5% 250 mL (100 units/mL) infusion (heparin infusion)  17 Units/kg/hr (Order-Specific) Intravenous Continuous Comfort Pereira MD 19.3 mL/hr at 01/23/17 1400 22.976 Units/kg/hr at 01/23/17 1400    insulin aspart pen 0-5 Units  0-5 Units Subcutaneous QID (AC + HS) PRN Los De León MD        magnesium oxide tablet 400 mg  400 mg Oral BID Louise Monroy PA-C   400 mg at 01/23/17 0843    nitroGLYCERIN SL tablet 0.4 mg  0.4 mg Sublingual Q5 Min PRN Louise Monroy PA-C   0.4 mg at 01/12/17 2253    ondansetron injection 8 mg  8 mg Intravenous Q6H PRN ROSEANNE Perera   8 mg at 01/21/17 1548    promethazine (PHENERGAN) 12.5 mg in dextrose 5 % 50 mL IVPB  12.5 mg Intravenous Q6H PRN ROSEANNE Perera 150 mL/hr at 01/22/17 1218 12.5 mg at 01/22/17 1218    senna-docusate 8.6-50 mg per tablet 1 tablet  1 tablet Oral BID Barak Wilkerson NP   1 tablet at 01/20/17 2017    sodium chloride 0.9% flush 10 mL  10 mL Intravenous Q6H Comfort Pereira MD   10 mL at 01/23/17 0357    And    sodium chloride 0.9% flush 10 mL  10 mL Intravenous PRN Comfort ePreira MD        trazodone split tablet 25 mg  25 mg Oral Nightly PRN ROSEANNE Perera           Review of Systems:  Gen: denies fever chills fatigue weight loss  HEENT: denies HA, blurry vision, tinnitus, dry mouth  Resp: denies SOB, cough, hemoptysis  CV: Denies CP, palps, orthopnea, PND, edema  GI: denies abd pain, nausea/vomiting, diarrhea, melena, constipation, hematochezia  : denies dysuria,  hematuria  MSK: denies arthralgia, myalgia  Neuro: denies LOC, weakness, seizure, HA, sensory loss  Psych: denies depression, anxiety, AH/VH  Endo: denies heat or cold intolerance, goiter  Heme: denies bleeding, infection, petechiae       OBJECTIVE:     Vital Signs (Most Recent)  Temp: 98.5 °F (36.9 °C) (01/23/17 1100)  Pulse: (!) 112 (01/23/17 1400)  Resp: (!) 33 (01/23/17 1400)  BP: (!) 81/59 (01/23/17 1400)  SpO2: 97 % (01/23/17 1400)    Vital Signs Range (Last 24H):  Temp:  [98.2 °F (36.8 °C)-99.5 °F (37.5 °C)]   Pulse:  [112-139]   Resp:  [13-57]   BP: ()/(49-66)   SpO2:  [90 %-98 %]     Physical Exam:  Gen: no acute distress, alert & oriented x 3  HEENT: normocephalic, atraumatic, extraocular movements intact, pupils equal round reactive to light, moist mucous membranes  Neck: no JVD, no carotid bruits, R CVL in IJ  CV: tachy but regular rate, normal S1/2, no murmurs, rubs, gallops  Resp: clear to auscultation bilaterally, normal effort  GI: soft, nontender nondistended, normal bowel sounds  Ext: warm well perfused, no edema, no clubbing or cyanosis      Laboratory:  Chemistry:  Lab Results   Component Value Date     01/23/2017     01/23/2017    K 4.0 01/23/2017    K 4.0 01/23/2017     01/23/2017     01/23/2017    CO2 28 01/23/2017    CO2 28 01/23/2017    BUN 16 01/23/2017    BUN 16 01/23/2017    CREATININE 1.0 01/23/2017    CREATININE 1.0 01/23/2017    CALCIUM 8.7 01/23/2017    CALCIUM 8.7 01/23/2017     (H) 01/16/2017     Cardiac Markers (Last 3):  Lab Results   Component Value Date    TROPONINI 0.018 01/12/2017    TROPONINI 0.018 01/12/2017    TROPONINI 0.019 01/12/2017     CBC:   Lab Results   Component Value Date    WBC 9.83 01/23/2017    HGB 10.3 (L) 01/23/2017    HCT 32.0 (L) 01/23/2017    MCV 74 (L) 01/23/2017     01/23/2017     Lipids:  Lab Results   Component Value Date    CHOL 169 01/19/2017    TRIG 147 01/19/2017    HDL 36 (L) 01/19/2017     Coagulation:    Lab Results   Component Value Date    INR 1.4 (H) 01/20/2017    APTT 105.8 (H) 01/11/2017       Diagnostic Results:  EKG -- sinus tachycardia then SVT with different p wave morphology most consistent with atrial tachycardia    ASSESSMENT/PLAN:     Ms. Baca is a 61 year old woman with NICM on  undergoing advanced therapies evaluation who is found to be in recurrent atrial tachycardia that is vagally responsive. Unable to tolerate BB or CCV due to inotrope-dependent NICM.    Plan:   --should she revert to SVT, vigorous carotid massage or other vagal maneuvers prn  --will discuss amiodarone initiation with [facility] staff    Thank you for this interesting consult. Further recommendations per attending addendum    Hair Calixto MD  PGY-[phone])  Cardiology Fellow

## 2017-01-24 NOTE — PLAN OF CARE
Problem: Physical Therapy Goal  Goal: Physical Therapy Goal  Goals to be met by: 2017     Patient will increase functional independence with mobility by performin. Supine to sit with Modified Rooks -not met  2. Sit to supine with Modified Rooks - met  3. Sit to stand transfer with Modified Rooks - not  4. Bed to chair transfer with Modified Rooks -not met  5. Gait x 200 feet with Supervision -not met  6. Ascend/descend 5 stairs with no Handrails Stand-by Assistance - not met   Outcome: Ongoing (interventions implemented as appropriate)  Goals reviewed and remain appropriate. Pt progressing towards goals.     Pilar Phoenix, PT, DPT   2017  [phone]

## 2017-01-24 NOTE — PLAN OF CARE
Problem: Patient Care Overview  Goal: Plan of Care Review  Outcome: Ongoing (interventions implemented as appropriate)  Pt remains in CMICU 3078. No acute events overnight. VSS. AAOx4. St/-130s, BP stable. Afebrile. CVP 4, 4, 6. RA, O2 sats >92%, pt had nonproductive cough overnight. Cardiac diet. Pt not eating much due to intermittent periods of nausea, controlled with PRN medications. No BM this shift. Voids per bedside commode, UOA. Skin intact. Pain controlled. WHITNEY PICC, R IJ TLC. Heparin, Dobutamine, KVO. POC discussed with pt and . All questions and concerns addressed. WCTM.

## 2017-01-24 NOTE — PLAN OF CARE
Problem: Occupational Therapy Goal  Goal: Occupational Therapy Goal  Goals to be met by: 7 days 2/1/17    Patient will increase functional independence with ADLs by performing:    UE Dressing with Set-up Assistance.  LE Dressing with Supervision.  Grooming while standing with Supervision.  Toileting from toilet with Supervision for hygiene and clothing management.   Stand pivot transfers with Supervision.  Toilet transfer to toilet with Supervision.  Goals and POC established today

## 2017-01-24 NOTE — PROGRESS NOTES
1000:  Patient with continued c/o nausea this morning without relief after phenergan administered.  Eliana RIVERA with HTS notified, additional one time 12.5 mg phenergan ordered.  SVO2 43.    1200:  Recheck SVO2 40.  Eliana notified.  New orders for patient to be NPO, Dobutamine increased from 2.5 to 5 mcg/kg/min, labs sent, and consult placed to Interventional Cardiology for possible IABP placement this afternoon.      1300:  Eliana RIVERA and Dr. Pozo with H[facility] at bedside.  Recheck SVO2 49.  Dr. Varghese with Interventional Cardiology at bedside obtaining consent for IABP.      1345:  To cath lab per cath lab RN and tech.

## 2017-01-24 NOTE — NURSING
Pt states she is nauseous. Informed that she can have Phenergan for nausea, Phenergan administered per order. Pt states she is feeling better, appears to be resting. WCTM.

## 2017-01-24 NOTE — CONSULTS
Interventional Cardiology Consult Note  Attending Physician: Comfort Pereira MD  Reason for Consult: Cardiogenic Shock in need of IABP     HPI:   61 y.o. woman with PMH of NICM (LVEF 15%), apical LV thrombus, NSVT/VT and atrial tachycardia who presented on 01/05/2017 with acute on chronic systolic heart failure. She is currently undergoing VAD/OHTx workup. She complains of nausea and decreased appetite, but no SOB or chest pains. An ScVO2 was obtained this morning that identified 40% while on dobutamine 5.    ROS:    Constitution: Negative for fever, chills, weight loss or gain.   HENT: Negative for sore throat, rhinorrhea, or headache.  Eyes: Negative for blurred or double vision.   Cardiovascular: See above  Pulmonary: Negative for SOB   Gastrointestinal: Negative for abdominal pain, nausea, vomiting, or diarrhea.   : Negative for dysuria.   Neurological: Negative for focal weakness or sensory changes.  PMH:     No prescriptions prior to admission.      Review of patient's allergies indicates:  No Known Allergies    Past Medical History   Diagnosis Date    Apical mural thrombus 01/04/2017    NICM (nonischemic cardiomyopathy) 01/04/2017    NICM (nonischemic cardiomyopathy) 01/04/2017       History reviewed. No pertinent past surgical history.   History reviewed. No pertinent family history.    Social History   Substance Use Topics    Smoking status: Former Smoker     Packs/day: 0.25     Types: Cigarettes     Start date: 1/6/1977     Quit date: 9/6/2016    Smokeless tobacco: None    Alcohol use None        Allergies:   Review of patient's allergies indicates:  No Known Allergies  Medications:     No current facility-administered medications on file prior to encounter.      No current outpatient prescriptions on file prior to encounter.       Inpatient Medications   Continuous Infusions:   DOBUTamine 5 mcg/kg/min (01/24/17 1221)    heparin (porcine) in 5 % dex 20 Units/kg/hr (01/24/17 1200)      Scheduled Meds:   amiodarone  200 mg Oral BID    cetirizine  5 mg Oral Daily    famotidine  20 mg Oral BID    furosemide  20 mg Oral Daily    magnesium oxide  400 mg Oral BID    senna-docusate 8.6-50 mg  1 tablet Oral BID    sodium chloride 0.9%  10 mL Intravenous Q6H     PRN Meds:aluminum & magnesium hydroxide-simethicone, benzonatate, calcium carbonate, diphenhydrAMINE, diphenhydrAMINE-zinc acetate 1-0.1%, heparin (PORCINE), insulin aspart, nitroGLYCERIN, ondansetron, promethazine (PHENERGAN) IVPB, Flushing PICC Protocol **AND** sodium chloride 0.9% **AND** sodium chloride 0.9%     Social History:     Social History   Substance Use Topics    Smoking status: Former Smoker     Packs/day: 0.25     Types: Cigarettes     Start date: 1/6/1977     Quit date: 9/6/2016    Smokeless tobacco: Not on file    Alcohol use Not on file     Family History:   History reviewed. No pertinent family history.  Physical Exam:     Vitals:  Temp:  [98.3 °F (36.8 °C)-99.9 °F (37.7 °C)]   Pulse:  [112-144]   Resp:  [18-59]   BP: ()/(50-74)   SpO2:  [88 %-97 %]  on RA I/O's:    Intake/Output Summary (Last 24 hours) at 01/24/17 1316  Last data filed at 01/24/17 1221   Gross per 24 hour   Intake          1199.96 ml   Output              750 ml   Net           449.96 ml        Constitutional: NAD, conversant  HEENT: Sclera anicteric, PERRLA, EOMI  Neck: 16-18 cm JVD, no carotid bruits  CV: Tachy, no murmur, normal S1/S2, +S3   Pulm: CTAB, no wheezes, rales, or ronchi  GI: Abdomen soft, NTND, +BS  Extremities: No LE edema, warm and well perfused  Skin: No ecchymosis, erythema, or ulcers  Psych: AOx3, appropriate affect  Neuro: CNII-XII intact, no focal deficits  Pulses: 2+ pulses radial, femoral, DP, and PT bilaterally    Labs:     CBC: CMP:      Recent Labs  Lab 01/22/17  0400 01/23/17  0340 01/24/17  0252   WBC 9.27 9.83 9.41   HGB 10.9* 10.3* 10.2*   HCT 33.8* 32.0* 31.7*    237 287   MCV 75* 74* 76*   RDW 18.3*  18.2* 17.9*      Recent Labs  Lab 01/22/17  0400  01/23/17  0340 01/23/17  0833 01/24/17  0252 01/24/17  0847 01/24/17  1212   NA  --   < >  --  136  136 133* 132* 130*   K  --   < >  --  4.0  4.0 4.3 4.4 4.2   CL  --   < >  --  100  100 97 100 98   CO2  --   < >  --  28  28 26 25 27   BUN  --   < >  --  16  16 15 15 16   CREATININE  --   < >  --  1.0  1.0 1.1 1.1 1.1   CALCIUM  --   < >  --  8.7  8.7 8.9 9.0 8.8   PROT  --   < >  --  6.7  6.7 6.7 6.8 6.7   BILITOT  --   < >  --  0.8  0.8 0.7 0.8 0.6   ALKPHOS  --   < >  --  40*  40* 41* 41* 40*   ALT  --   < >  --  9*  9* 10 8* 9*   AST  --   < >  --  13  13 13 14 13   MG  --   < >  --  2.0 2.0 1.9  --    PHOS 3.9  --  3.5  --  4.4  --   --    < > = values in this interval not displayed.     Cholesterol: Coagulation   Lab Results   Component Value Date    CHOL 169 01/19/2017    HDL 36 (L) 01/19/2017    LDLCALC 103.6 01/19/2017    TRIG 147 01/19/2017      Recent Labs  Lab 01/24/17  1212   INR 1.4*        Misc:   No results for input(s): CPK, CPKMB, TROPONINI, MB in the last 168 hours.   Lab Results   Component Value Date    HGBA1C 7.6 (H) 01/12/2017        Micro:   Microbiology Results (last 7 days)     Procedure Component Value Units Date/Time    Urine culture [[id number]] Collected:  01/18/17 2228    Order Status:  Completed Specimen:  Urine from Urine, Clean Catch Updated:  01/20/17 0237     Urine Culture, Routine No growth           Imaging:     CXR (01/24/2017):   Right-sided PICC and right internal jugular central venous catheter in stable position.  Mediastinal structures are midline.  Cardiomediastinal silhouette is in is in upper limits of normal.  Calcific atherosclerosis of the aortic arch.  Lung volumes are equal and symmetric.   . There is persistent patchy consolidation in the right lateral mid to lower lung zone, similar to prior examination.  The left lung is stable.  There is no pneumothorax.  There is blunting of the right costophrenic  angle, likely secondary to pleural fluid.  No acute osseous abnormality.     EKG (01/23/2017):   Tachycardia with PVC.     2D Echo (01/14/2017):   1 - Eccentric hypertrophy.     2 - Severely depressed left ventricular systolic function (EF 10-15%).     3 - Biatrial enlargement.     4 - Right ventricular enlargement with severely depressed systolic function.     5 - The estimated PA systolic pressure is 22 mmHg.     6 - Mild mitral regurgitation.     7 - Mild tricuspid regurgitation.     8 - Low central venous pressure.     9 - There is thrombus in the cardiac apex.     Assessment:   Ms. Lozano is a 61 year old female with multiple comorbidities whom Interventional cardiology has been consulted for cardiogenic shock in need of IABP     Plan:     1.) Cardiogenic Shock  --hold heparin gtt now  --proceed with IABP via R CFA  --will also switch TLC for leave in [place]    The risks, benefits, and alternatives of IABP insertion and Leave in [place] were discussed with the patient. All questions were answered and informed consent was obtained. I had a detailed discussion with the patient regarding risk of stroke, MI, bleeding access site complications, renal failure, emergent need for heart surgery, acute limb complications including ischemia and loss, and death.    Thank you for the consult. Staff addendum to follow    Signed:  Nan Varghese MD  Cardiology Fellow, PGY4  1/24/2017 1:16 PM

## 2017-01-24 NOTE — PROGRESS NOTES
Cardiac EP Progress Note    CC: Atrial tachycardia    S: Remains intermittently in atrial tachycardia, asymptomatic, Rates 120s. Undergoing advanced therapies eval    Current Facility-Administered Medications   Medication Dose Route Frequency Provider Last Rate Last Dose    aluminum & magnesium hydroxide-simethicone 400-400-40 mg/5 mL suspension 30 mL  30 mL Oral Q6H PRN Ross Prather MD   30 mL at 01/16/17 0318    benzonatate capsule 100 mg  100 mg Oral TID PRN Louis Jenkins MD   100 mg at 01/24/17 0719    calcium carbonate 200 mg calcium (500 mg) chewable tablet 500 mg  500 mg Oral Daily PRN Louis Jenkins MD   500 mg at 01/23/17 0440    cetirizine tablet 5 mg  5 mg Oral Daily Clovis Jimenez MD   5 mg at 01/23/17 1039    diphenhydrAMINE capsule 25 mg  25 mg Oral Q6H PRN Clovis Jimenez MD   25 mg at 01/20/17 2304    diphenhydrAMINE-zinc acetate 1-0.1% cream   Topical TID PRN Ross Prather MD        DOBUTamine 500mg in D5W 250mL infusion (premix) (NON-TITRATING)  2.5 mcg/kg/min (Dosing Weight) Intravenous Continuous Louis Jenkins MD 8.2 mL/hr at 01/24/17 0800 2.5 mcg/kg/min at 01/24/17 0800    famotidine tablet 20 mg  20 mg Oral BID Clovis Jimenez MD   20 mg at 01/24/17 0850    furosemide tablet 20 mg  20 mg Oral Daily Clovis Jimenez MD   20 mg at 01/24/17 0850    heparin 25,000 units in dextrose 5% 250 mL (100 units/mL) bolus from bag; ADDITIONAL PRN BOLUS  15 Units/kg (Order-Specific) Intravenous PRN Louise Monroy PA-C   1,260 Units at 01/19/17 0958    heparin 25,000 units in dextrose 5% 250 mL (100 units/mL) infusion (heparin infusion)  17 Units/kg/hr (Order-Specific) Intravenous Continuous Comfort Pereira MD 16.8 mL/hr at 01/24/17 0800 20 Units/kg/hr at 01/24/17 0800    insulin aspart pen 0-5 Units  0-5 Units Subcutaneous QID (AC + HS) PRN Los De León MD        magnesium oxide tablet 400 mg  400 mg Oral BID Louise Monroy PA-C   400 mg at  "01/24/17 0850    nitroGLYCERIN SL tablet 0.4 mg  0.4 mg Sublingual Q5 Min PRN Louise Monroy PA-C   0.4 mg at 01/12/17 2253    ondansetron injection 8 mg  8 mg Intravenous Q6H PRN ROSEANNE Perera   8 mg at 01/24/17 0411    promethazine (PHENERGAN) 12.5 mg in dextrose 5 % 50 mL IVPB  12.5 mg Intravenous Q6H PRN ROSEANNE Perera 150 mL/hr at 01/24/17 0740 12.5 mg at 01/24/17 0740    senna-docusate 8.6-50 mg per tablet 1 tablet  1 tablet Oral BID Barak Wilkerson NP   1 tablet at 01/24/17 0850    sodium chloride 0.9% flush 10 mL  10 mL Intravenous Q6H Comfort Pereira MD   10 mL at 01/23/17 0357    And    sodium chloride 0.9% flush 10 mL  10 mL Intravenous PRN Comfort Pereira MD        trazodone split tablet 25 mg  25 mg Oral Nightly PRN ROSEANNE Perera   25 mg at 01/23/17 2047       Gen: denies fever chills fatigue weight loss  HEENT: denies HA, blurry vision, tinnitus, dry mouth  Resp: denies SOB, cough, hemoptysis  CV: Denies CP, palps, orthopnea, PND, edema  GI: denies abd pain, nausea/vomiting, diarrhea, melena, constipation, hematochezia      O:  Visit Vitals    BP (!) 89/61 (BP Location: Left arm, Patient Position: Lying, BP Method: Automatic)    Pulse (!) 129    Temp 98.7 °F (37.1 °C) (Oral)    Resp 18    Ht 5' 11" (1.803 m)    Wt 96.2 kg (212 lb 1.3 oz)    SpO2 (!) 94%    Breastfeeding No    BMI 29.58 kg/m2       Intake/Output Summary (Last 24 hours) at 01/24/17 0854  Last data filed at 01/24/17 0800   Gross per 24 hour   Intake          1315.09 ml   Output             1350 ml   Net           -34.91 ml       Gen: no acute distress, alert & oriented x 3  HEENT: normocephalic, atraumatic, extraocular movements intact, pupils equal round reactive to light, moist mucous membranes  Neck: no JVD, no carotid bruits, R CVL in IJ  CV: tachy but regular rate, normal S1/2, no murmurs, rubs, gallops  Resp: clear to auscultation bilaterally, normal effort  GI: soft, nontender nondistended, " normal bowel sounds  Ext: warm well perfused, no edema, no clubbing or cyanosis    Lab Results   Component Value Date    WBC 9.41 01/24/2017    HGB 10.2 (L) 01/24/2017    HCT 31.7 (L) 01/24/2017    MCV 76 (L) 01/24/2017     01/24/2017     BMP  Lab Results   Component Value Date     (L) 01/24/2017    K 4.3 01/24/2017    CL 97 01/24/2017    CO2 26 01/24/2017    BUN 15 01/24/2017    CREATININE 1.1 01/24/2017    CALCIUM 8.9 01/24/2017    ANIONGAP 10 01/24/2017    ESTGFRAFRICA >60.0 01/24/2017    EGFRNONAA 54.3 (A) 01/24/2017       Telemetry with ATach in 130s    A/P:  Ms. Baca is a 61 year old woman with NICM on  undergoing advanced therapies evaluation who is found to be in recurrent atrial tachycardia that is vagally responsive. Unable to tolerate BB or CCV due to inotrope-dependent NICM. Will likely need ICD as outpatient, pending recovery of systolic function.     Plan:   --recommend amiodarone 200mg BID PO  --will likely need Lifevest at DC and f/u for ICD should her LVEF not recover  --her SVT is vagally responsive should her rates increase    Further recommendations per attending addendum    Hair Calixto MD  PGY-4 [phone])  Cardiology Fellow

## 2017-01-24 NOTE — NURSING
Pt called RN to bedside, pt moaning and states she feels nauseous again. Zofran administered per order. Pt states she feels some relief. WCALVIN.

## 2017-01-24 NOTE — PT/OT/SLP EVAL
"Occupational Therapy  Evaluation    Vaishnavi Baca   MRN: [id number]   Admitting Diagnosis: Acute decompensated heart failure    OT Date of Treatment: 17   OT Start Time: 0845  OT Stop Time: 0910  OT Total Time (min): 25 min    Billable Minutes:  Evaluation 25    Diagnosis: Acute decompensated heart failure       Past Medical History   Diagnosis Date    Apical mural thrombus 2017    NICM (nonischemic cardiomyopathy) 2017    NICM (nonischemic cardiomyopathy) 2017      History reviewed. No pertinent past surgical history.        General Precautions: Standard, fall  Orthopedic Precautions: N/A       Patient History:  Living Environment  Lives With: spouse  Living Arrangements: house  Home Accessibility: stairs to enter home  Number of Stairs to Enter Home: 5  Living Environment Comment: Pt lives with spouse in one story home wtih 5 [address]ILL[address]. 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   Equipment Currently Used at Home: none      Subjective:  Communicated with nsg prior to session.  "I could do more if I wasn't nauseated" pt reports.     Pain Ratin/10     Objective:   Pt found supine in bed. Pt was medicated for nausea prior to arrival. Pt lethargic from meds but reports continued nausea.     Cognitive Exam:  Oriented to: Person, Place, Time and Situation  Follows Commands/attention: Follows one-step commands  Communication: clear/fluent  Memory:  No Deficits noted  Safety awareness/insight to disability: intact  Coping skills/emotional control: Appropriate to situation      Physical Exam:  Pt is right hand dominant and demo intact UE strength/ROM. Pt with intact GM/FM coordination and intact light touch sensation.       Functional Mobility:  Bed Mobility:  Sit to Supine: Stand by Assistance    Transfers:  Sit <> Stand Assistance: Stand By Assistance  Sit <> Stand Assistive Device: No Assistive Device  Pt able to stand and scoot self to HOB. Pt unable take further steps and " "declined t/f to b/s chair due to c/o of nausea.      Activities of Daily Living:     UE Dressing Level of Assistance: Supervision  LE Dressing Level of Assistance: Stand by assistance     Grooming Level of Assistance: Supervision       Balance:   Pt demo Good sitting balance.     AM-PAC 6 CLICK ADL  How much help from another person does this patient currently need?  1 = Unable, Total/Dependent Assistance  2 = A lot, Maximum/Moderate Assistance  3 = A little, Minimum/Contact Guard/Supervision  4 = None, Modified [place]/Independent    Putting on and taking off regular lower body clothing? : 3  Bathing (including washing, rinsing, drying)?: 3  Toileting, which includes using toilet, bedpan, or urinal? : 3  Putting on and taking off regular upper body clothing?: 3  Taking care of personal grooming such as brushing teeth?: 3  Eating meals?: 4  Total Score: 19    AM-PAC Raw Score CMS "G-Code Modifier Level of Impairment Assistance   6 % Total / Unable   7 - 9 CM 80 - 100% Maximal Assist   10 - 14 CL 60 - 80% Moderate Assist   15 - 19 CK 40 - 60% Moderate Assist   20 - 22 CJ 20 - 40% Minimal Assist   23 CI 1-20% SBA / CGA   24 CH 0% Independent/ Mod I       Patient left supine with all lines intact, call button in reach and nsg notified    Assessment:  Vaishnavi Baca is a 61 y.o. female with a medical diagnosis of Acute decompensated heart failure and tolerated session fairly well. Pt with c/o of nausea which limited participation with  functional mobility/ADL skills.    Rehab identified problem list/impairments: Rehab identified problem list/impairments: weakness, impaired endurance, impaired self care skills, impaired functional mobilty, impaired balance, gait instability    Rehab potential is good.    Activity tolerance: Good    Discharge recommendations: Discharge Facility/Level Of Care Needs: home with home health       GOALS:   Occupational Therapy Goals        Problem: Occupational Therapy Goal    Goal " Priority Disciplines Outcome Interventions   Occupational Therapy Goal     OT, PT/OT     Description:  Goals to be met by:  7 days 2/1/17    Patient will increase functional independence with ADLs by performing:    UE Dressing with Set-up Assistance.  LE Dressing with Supervision.  Grooming while standing with Supervision.  Toileting from toilet with Supervision for hygiene and clothing management.   Stand pivot transfers with Supervision.  Toilet transfer to toilet with Supervision.                PLAN:  Patient to be seen 3 x/week to address the above listed problems via self-care/home management, therapeutic activities, therapeutic exercises  Plan of Care expires:    Plan of Care reviewed with: patient         HOSEA Yepez  01/24/2017

## 2017-01-24 NOTE — NURSING
Pt states coughing is causing her to not remain asleep. Requests medication. Tessalon capsule ordered PRN for cough, pt states she feels relief. WCTM.

## 2017-01-24 NOTE — PROGRESS NOTES
Progress Note  Heart Transplant Service    Admit Date: 1/4/2017   LOS: 20 days     SUBJECTIVE:     Follow up for: Acute decompensated heart failure    Interval History: Patient c/o nausea, unrelieved by zofran and usually relieved by phenergan which she just received. Patient with no new complaints.     Scheduled Meds:   amiodarone  200 mg Oral BID    cetirizine  5 mg Oral Daily    famotidine  20 mg Oral BID    furosemide  20 mg Oral Daily    magnesium oxide  400 mg Oral BID    magnesium sulfate IVPB  1 g Intravenous Once    senna-docusate 8.6-50 mg  1 tablet Oral BID    sodium chloride 0.9%  10 mL Intravenous Q6H     Continuous Infusions:   DOBUTamine 5 mcg/kg/min (01/24/17 1221)    heparin (porcine) in 5 % dex 20 Units/kg/hr (01/24/17 1200)     PRN Meds:aluminum & magnesium hydroxide-simethicone, benzonatate, calcium carbonate, diphenhydrAMINE, diphenhydrAMINE-zinc acetate 1-0.1%, heparin (PORCINE), insulin aspart, nitroGLYCERIN, ondansetron, promethazine (PHENERGAN) IVPB, Flushing PICC Protocol **AND** sodium chloride 0.9% **AND** sodium chloride 0.9%    Review of patient's allergies indicates:  No Known Allergies    OBJECTIVE:     Vital Signs (Most Recent)  Temp: 98.7 °F (37.1 °C) (01/24/17 1100)  Pulse: (!) 127 (01/24/17 1200)  Resp: (!) 29 (01/24/17 1200)  BP: (!) 88/50 (01/24/17 1200)  SpO2: 96 % (01/24/17 1200)    Vital Signs Range (Last 24H):  Temp:  [98.3 °F (36.8 °C)-99.9 °F (37.7 °C)]   Pulse:  [112-144]   Resp:  [18-59]   BP: ()/(50-74)   SpO2:  [88 %-97 %]     I & O (Last 24H):    Intake/Output Summary (Last 24 hours) at 01/24/17 1224  Last data filed at 01/24/17 1221   Gross per 24 hour   Intake          1204.96 ml   Output              750 ml   Net           454.96 ml     CVP:  [7 mmHg] 7 mmHg    Telemetry: atrial tach in 120-130s with frequent PVC's    Constitutional: laying in bed NAD  Neck:JVD at clavicle  Cardiovascular: Tachycardic rate, regular rhythm and intact distal pulses.   No murmurs heard, +S3   Pulmonary/Chest: No audible wheezes, rhonchi, rales.  Abdominal: + BS, exhibits no distension. There is no tenderness. There is no rebound.   Extremities: no cyanosis, clubbing or edema. LE warm to touch bilaterally    Labs:     Recent Labs  Lab 01/22/17  0400 01/23/17  0340 01/24/17  0252   WBC 9.27 9.83 9.41   HGB 10.9* 10.3* 10.2*   HCT 33.8* 32.0* 31.7*    237 287   LYMPH 31.1  2.9 28.6  2.8 25.1  2.4   MONO 9.7  0.9 8.6  0.9 10.6  1.0   EOSINOPHIL 2.5 2.5 2.3         Recent Labs  Lab 01/18/17  0526 01/19/17  0528 01/20/17  0520   INR 1.1 1.2 1.4*          Recent Labs  Lab 01/22/17  0400  01/23/17  0340 01/23/17  0833 01/24/17  0252 01/24/17  0847   GLU  --   < >  --  117*  117* 191* 147*   CALCIUM  --   < >  --  8.7  8.7 8.9 9.0   ALBUMIN  --   < >  --  2.8*  2.8* 2.8* 2.7*   PROT  --   < >  --  6.7  6.7 6.7 6.8   NA  --   < >  --  136  136 133* 132*   K  --   < >  --  4.0  4.0 4.3 4.4   CO2  --   < >  --  28  28 26 25   CL  --   < >  --  100  100 97 100   BUN  --   < >  --  16  16 15 15   CREATININE  --   < >  --  1.0  1.0 1.1 1.1   ALKPHOS  --   < >  --  40*  40* 41* 41*   ALT  --   < >  --  9*  9* 10 8*   AST  --   < >  --  13  13 13 14   BILITOT  --   < >  --  0.8  0.8 0.7 0.8   MG  --   < >  --  2.0 2.0 1.9   PHOS 3.9  --  3.5  --  4.4  --    < > = values in this interval not displayed.  Estimated Creatinine Clearance: 68.7 mL/min (based on Cr of 1.1).    No results for input(s): BNP, BNPTRIAGEBLO in the last 168 hours.    No results for input(s): LDH in the last 168 hours.    Microbiology Results (last 7 days)     Procedure Component Value Units Date/Time    Urine culture [[id number]] Collected:  01/18/17 2225    Order Status:  Completed Specimen:  Urine from Urine, Clean Catch Updated:  01/20/17 0237     Urine Culture, Routine No growth            ASSESSMENT:     62 y/o with no PMHx, recently diagnosed with heart failure, [facility] with no evidence of CAD,  transferred from [facility] in [place] for advanced heart failure consideration.    PLAN:     Acute on Chronic Combined Systolic and Diastolic Heart Failure, Diagnosed 11/2016 LVEDD 5.6 cm  -NICM, NYHA IV, EF 15-20%, LVEDD: 5.6  -attempted GDMT with Lisinopril and Spironolactone but was d/c'ed 2/2 hypotension  - 2.5 mcg/kg/min, PICC line placed initially for home . Unable to titrate GDMT secondary to hypotension  -Will get SVO2 from central line today, patient persistently nauseated which is concerning for cardiogenic shock/worsening HF  -Lasix started 1/18. CVP 6 this am and overnight  -Colorectal surgery consulted for colonoscopy but was not performed due to medical instability. Also needs mammogram to complete w/u for OHTx/VAD  -Positive strongyloides lab (treated with Ivermectin)   -2 g Na dietary restriction, 1500 mL fluid restriction, strict I/Os     Atrial Tachycardia  -EP consulted, will add amiodarone 200 mg BID    Hypoxemia, improved  - Pulmonary consultrf, appreciate recs.  Plan is for bronch and BAL after HR is better  -Lung Nodule, found incidentally on CT; plan for repeat CT in 6-12 months     Chest Pain, resolved  -Suspect it is non-cardiac, she has been chest pain free   -Troponins negative, EKG without ischemic changes  -Will continue to monitor    LV Apical Thrombus  -Heparin infusion. Defer PO anticoagulation until plan made re: OHTx/VAD  -Present on repeat 2D echo 1/14/17    NSVT/VT  -Will monitor closely on Dobutamine    DM  -Appreciate Endocrinology consultation  -HbgA1c: 7.6  -poct glucose AC/HS with low dose correction insulin    Pruritus  -Switch benadryl to zyrtec today with Pepcid  -Dermatology consulted appreciate odilon

## 2017-01-24 NOTE — PROGRESS NOTES
Pulmonary & Critical Care Medicine   Progress Note    Reason for Consultation: hypoxemia    Interval Hx: NAEON. Patient saturating in 90s on RA. Denies any SOB, cough, or chest pain. Only complaint is nausea.     Past Medical History   Diagnosis Date    Apical mural thrombus 01/04/2017    NICM (nonischemic cardiomyopathy) 01/04/2017    NICM (nonischemic cardiomyopathy) 01/04/2017     History reviewed. No pertinent past surgical history.  Social History:   Social History     Social History    Marital status:      Spouse name: N/A    Number of children: N/A    Years of education: N/A     Occupational History    Not on file.     Social History Main Topics    Smoking status: Former Smoker     Packs/day: 0.25     Types: Cigarettes     Start date: 1/6/1977     Quit date: 9/6/2016    Smokeless tobacco: Not on file    Alcohol use Not on file    Drug use: Not on file    Sexual activity: Not on file     Other Topics Concern    Not on file     Social History Narrative     History reviewed. No pertinent family history.  Drug Allergies:   Review of patient's allergies indicates:  No Known Allergies  Current Infusions:   DOBUTamine 2.5 mcg/kg/min (01/24/17 0700)    heparin (porcine) in 5 % dex 20 Units/kg/hr (01/24/17 0700)     Scheduled Medications:     cetirizine  5 mg Oral Daily    famotidine  20 mg Oral BID    furosemide  20 mg Oral Daily    magnesium oxide  400 mg Oral BID    senna-docusate 8.6-50 mg  1 tablet Oral BID    sodium chloride 0.9%  10 mL Intravenous Q6H     PRN Medications:   aluminum & magnesium hydroxide-simethicone, benzonatate, calcium carbonate, diphenhydrAMINE, diphenhydrAMINE-zinc acetate 1-0.1%, heparin (PORCINE), insulin aspart, nitroGLYCERIN, ondansetron, promethazine (PHENERGAN) IVPB, Flushing PICC Protocol **AND** sodium chloride 0.9% **AND** sodium chloride 0.9%, trazodone    Review of Systems:   A comprehensive 12-point review of systems was performed, and is negative  except for those items mentioned above in the HPI section of this note.     Vital Signs:    Vitals:    01/24/17 0700   BP: (!) 127/59   Pulse: (!) 128   Resp: (!) 25   Temp:        Fluid Balance:     Intake/Output Summary (Last 24 hours) at 01/24/17 0726  Last data filed at 01/24/17 0700   Gross per 24 hour   Intake          1240.09 ml   Output             1350 ml   Net          -109.91 ml       Physical Exam:   General: NAD, cooperative & interactive.  HEENT: AT/NC, EOMI  Neck: Supple, no JVD, no masses or nodularities  Cardiac: Tachycardic in 130s; no murmurs appreciated  Respiratory: Normal inspection. Symmetric chest rise.  Auscultation clear bilaterally. No increased work of breathing noted.   Abdomen: Soft, NT/ND. +BS  Extremities: No visible atrophy. No clubbing or cyanosis on nail exam.    Skin: Warm and dry without visible rash.     Personal Review and Summary of Prior Diagnostics    Laboratory Studies:   No results for input(s): PH, PCO2, PO2, HCO3, POCSATURATED, BE in the last 24 hours.    Recent Labs  Lab 01/24/17  0252   WBC 9.41   RBC 4.15   HGB 10.2*   HCT 31.7*      MCV 76*   MCH 24.6*   MCHC 32.2       Recent Labs  Lab 01/24/17  0252   *   K 4.3   CL 97   CO2 26   BUN 15   CREATININE 1.1   MG 2.0       Microbiology Data:   Microbiology Results (last 7 days)     Procedure Component Value Units Date/Time    Urine culture [[id number]] Collected:  01/18/17 2223    Order Status:  Completed Specimen:  Urine from Urine, Clean Catch Updated:  01/20/17 0237     Urine Culture, Routine No growth        Summary of Chest Imaging Personally Reviewed: See above in HPI    2D Echo: 1/14/17  CONCLUSIONS     1 - Eccentric hypertrophy.     2 - Severely depressed left ventricular systolic function (EF 10-15%).     3 - Biatrial enlargement.     4 - Right ventricular enlargement with severely depressed systolic function.     5 - The estimated PA systolic pressure is 22 mmHg.     6 - Mild mitral regurgitation.      7 - Mild tricuspid regurgitation.     8 - Low central venous pressure.     9 - There is thrombus in the cardiac apex.     PFT's: 1/16/17:  INTERPRETATIONS:  Normal spirometry. Nitrogen washout lung volumes are normal. Normal diffusion  capacity. Normal MVV. Oximetry is normal.    Impression & Recommendations    Pt is a 62 y/o F with NICM with EF 10%, LV thrombus, DM, NSVT who is here undergoing workup for heart transplant with an abnormal CT scan    - image review reveals migrating infiltrates which appear to be non-infectious due to course and her clinical picture.  - unclear etiology of NICM though does not seem to be any clear correlation between the two.  - ESR 49, CRP uptrending 15-->21-->136   - EMMANUEL, Rf, and CCP Abs negative  - ANCA, MPO pending  - This is likely a non-infectious inflammatory process () but patient may require bronchoscopy in the near future if labs unrevealing to r/o any active infection in light of possible LVAD v. Transplant candidacy.  - Will discuss trial of steroids once labs result, but for now hold off as there is concern if there is any active strongyloides (although unlikely). Upon review of past medical records, she was given trial of methylprednisolone for several days in the beginning of the month, so if steroids become indicated, would likely be safe to administer as no adverse reaction was documented in records.   - Will explore other diagnosis such as amyloid given cardiac history, although there is no evidence of amyloid on Echo (definitive diagnosis would require cardiac biopsy)  - Saturating well on RA, may require O2 w/ ambulation    Will follow, thank you for the consult.    Bethany Tuttle MD  PGY1

## 2017-01-24 NOTE — PT/OT/SLP PROGRESS
"Physical Therapy  Treatment    Vaishnavi Baca   MRN: [id number]   Admitting Diagnosis: Acute decompensated heart failure    PT Received On: 17  PT Start Time: 48     PT Stop Time: 904    PT Total Time (min): 16 min       Billable Minutes:  Therapeutic Activity 16    Treatment Type: Treatment  PT/PTA: PT     PTA Visit Number: 0       General Precautions: Standard, fall  Orthopedic Precautions: N/A   Braces: N/A    Do you have any cultural, spiritual, Sabianism conflicts, given your current situation?: None    Subjective:  Communicated with RN prior to session.  "If it wasn't for this nausea, I'd be ready to go."     Pain Ratin/10 (Pt denies pain but reports nausea.)    Objective:   Patient found with: telemetry, peripheral IV, pulse ox (continuous), blood pressure cuff, PICC line    Functional Mobility:  Bed Mobility:   Scooting/Bridging: Stand by Assistance (at EOB)  Supine to Sit: Stand by Assistance  Sit to Supine: Stand by Assistance    Transfers:  Sit <> Stand Assistance: Stand By Assistance  Sit <> Stand Assistive Device: No Assistive Device    Gait:   Gait Distance: Not performed this date 2* nausea    Balance:   Static Sit: supervision  Dynamic Sit: SBA  Static Stand: SBA     Therapeutic Activities and Exercises:  Reviewed LE therex. Pt instructed to continue performing (I) daily.  Pt educated on the importance of OOB activity. Pt instructed to attempt sitting UIC later today if nausea decreases.       AM-PAC 6 CLICK MOBILITY  How much help from another person does this patient currently need?   1 = Unable, Total/Dependent Assistance  2 = A lot, Maximum/Moderate Assistance  3 = A little, Minimum/Contact Guard/Supervision  4 = None, Modified [place]/Independent    Turning over in bed (including adjusting bedclothes, sheets and blankets)?: 4  Sitting down on and standing up from a chair with arms (e.g., wheelchair, bedside commode, etc.): 3  Moving from lying on back to sitting on the side of " the bed?: 3  Moving to and from a bed to a chair (including a wheelchair)?: 3  Need to walk in hospital room?: 3  Climbing 3-5 steps with a railing?: 2  Total Score: 18    AM-PAC Raw Score CMS G-Code Modifier Level of Impairment Assistance   6 % Total / Unable   7 - 9 CM 80 - 100% Maximal Assist   10 - 14 CL 60 - 80% Moderate Assist   15 - 19 CK 40 - 60% Moderate Assist   20 - 22 CJ 20 - 40% Minimal Assist   23 CI 1-20% SBA / CGA   24 CH 0% Independent/ Mod I     Patient left supine with all lines intact, call button in reach and RN notified.    Assessment:  Vaishnavi Baca is a 61 y.o. female with a medical diagnosis of Acute decompensated heart failure and presents with increased nausea, limiting tolerance of therapy this date. Pt able to perform bed mobility and transfers without physical assist, but unable to attempt gait 2* nausea and needing to return to supine. Pt would continue to benefit from skilled IP PT in order to address current deficits and progress functional mobility.     Rehab identified problem list/impairments: Rehab identified problem list/impairments: weakness, impaired self care skills, impaired balance, impaired endurance, impaired functional mobilty, gait instability, impaired cardiopulmonary response to activity    Rehab potential is good.    Activity tolerance: Fair    Discharge recommendations: Discharge Facility/Level Of Care Needs: home with home health     Barriers to discharge: Barriers to Discharge: Inaccessible home environment    Equipment recommendations: Equipment Needed After Discharge: none     GOALS:   Physical Therapy Goals        Problem: Physical Therapy Goal    Goal Priority Disciplines Outcome Goal Variances Interventions   Physical Therapy Goal     PT/OT, PT Ongoing (interventions implemented as appropriate)     Description:  Goals to be met by: 2017     Patient will increase functional independence with mobility by performin. Supine to sit with  Modified [place] -not  met  2. Sit to supine with Modified [place] - met  3. Sit to stand transfer with Modified [place] - not  4. Bed to chair transfer with Modified [place] -not met  5. Gait  x 200 feet with Supervision -not met  6. Ascend/descend 5 stairs with no Handrails Stand-by Assistance - not met                    PLAN:    Patient to be seen 3 x/week  to address the above listed problems via gait training, therapeutic activities, therapeutic exercises  Plan of Care expires: 02/21/17  Plan of Care reviewed with: patient        Pilar Rasheedard, PT, DPT   1/24/2017  [phone]

## 2017-01-25 NOTE — ANESTHESIA PROCEDURE NOTES
Intubation    Diagnosis: Acute Respiratory Failure  Patient location during procedure: ICU  Procedure start time: 1/25/2017 4:48 PM  Timeout: 1/25/2017 4:47 PM  Procedure end time: 1/25/2017 4:56 PM  Staffing  Anesthesiologist: DIVINA MATHEWS JR  Performed by: anesthesiologist   Anesthesiologist was present at the time of the procedure.  Preanesthetic Checklist  Completed: patient identified, site marked, surgical consent, pre-op evaluation, timeout performed, IV checked, risks and benefits discussed, monitors and equipment checked and anesthesia consent given  Intubation  Indication: respiratory distress, respiratory failure  Pre-oxygenation. Induction: intravenous, mask ventilation: easy mask.  Intubation: postinduction, [place] 2.  Endotracheal Tube: 8.0 mm ID, cuffed (inflated to minimal occlusive pressure)  Attempts: 1, Grade I - full view of cords  Complicating Factors: none  Tube secured at 22 cm at the lips.  Findings post-intubation: bilateral breath sounds, positive ETCO2, atraumatic / condition of teeth unchanged  Position Confirmation: auscultation  Eye Care: taped after intubation, padded and taped

## 2017-01-25 NOTE — PLAN OF CARE
Problem: Patient Care Overview  Goal: Plan of Care Review  Outcome: Ongoing (interventions implemented as appropriate)  Patient with increased nausea this morning, SVO2 43-40-49.  Dobutamine increased from 2.5 to 5 mcg/kg/min.  Interventional Cardiology consulted for IABP, which was placed this afternoon in cath lab with leave in swan.  IABP 1:2, ECG trigger, auto to right fem site CDI, pulses 2+ and unchanged.  [place] in place at 52 cm with dampened waveform.  Dobutamine and heparin continue.  No further c/o nausea this afternoon.  Plan of care to continue w/u for OHT.  Plan of care discussed with patient and her  at bedside.  All questions/concerns addressed.  Will continue to monitor.

## 2017-01-25 NOTE — PLAN OF CARE
Problem: Patient Care Overview  Goal: Plan of Care Review  Outcome: Ongoing (interventions implemented as appropriate)  VS and assessment per flow sheet, patient progressing towards goals as tolerated. Patient's SVO2 46 this AM, dropping as low as 31 yesterday evening. Balloon pump on 1:2 ECG trigger, augmenting anywhere from the high 70's- low 90's. Heart rate remained in the 130's-140's consistently throughout the night, getting as high as 150, MD notified and no additional orders placed. Patient consistently nauseous throughout the night with very minimal relief with promethazine.  Tiwari draining yellow urine, lasix given once. CVP's between 10-13. Plan of care reviewed with Vaishnavi Baca and family, all concerns addressed, will continue to monitor.

## 2017-01-25 NOTE — EICU
Video e-alerted into room by Niraj RN, Dr. Ellison, respiratory therapist and staff at bedside. Versed 1mg IVP and Etomidate 12mg IVP given. Pt intubated, 8.0 ET tube taped 22 cm@ the lip.

## 2017-01-25 NOTE — SIGNIFICANT EVENT
Called to beside to evaluate patient as SVo2 returned at 31. Patient complaining of increased nausea. SVo2 repeated and again, was 31. PA catheter in good position. IABP noted to be augmenting in the low 80s. IABP increased to 1:1 and continued to augment in the low 80s.     - Call perfusion to assist in improving IABP augmentation  - Add epi 0.04  - Add inhaled nitric at 20ppm  - Will recheck Sv02 in 1 hour  - Will determine need for intubation at that time  - Will consider Impella 5.0    Addendum:  Repeat Svo2 56  UOP 15cc over the last 3 hours    -Will give 80mg IVP Lasix and monitor response    Discussed with Dr. Pozo and her  Masoud Baca at bedside.    Signed:    Louis Jenkins MD  Cardiology Fellow, PGY-5  Pager: [phone]  1/24/2017 8:34 PM

## 2017-01-25 NOTE — PROGRESS NOTES
Cardiac EP Progress Note    CC: Atrial tachycardia    S: Decompensated yesterday, requiring IABP and Edwin. Likely to need advanced support soon as SvO2 low, poor perfusion. Looks ill this AM. Remains in AT this AM.    Current Facility-Administered Medications   Medication Dose Route Frequency Provider Last Rate Last Dose    aluminum & magnesium hydroxide-simethicone 400-400-40 mg/5 mL suspension 30 mL  30 mL Oral Q6H PRN Ross Prather MD   30 mL at 01/16/17 0318    amiodarone tablet 200 mg  200 mg Oral BID Louise Monroy PA-C   200 mg at 01/24/17 2059    benzonatate capsule 100 mg  100 mg Oral TID PRN Louis Jenkins MD   100 mg at 01/25/17 0331    calcium carbonate 200 mg calcium (500 mg) chewable tablet 500 mg  500 mg Oral Daily PRN Louis Jenkins MD   500 mg at 01/23/17 0440    cetirizine tablet 5 mg  5 mg Oral Daily Clovis Jimenez MD   5 mg at 01/24/17 1016    diphenhydrAMINE capsule 25 mg  25 mg Oral Q6H PRN Clovis Jimenez MD   25 mg at 01/25/17 0523    diphenhydrAMINE-zinc acetate 1-0.1% cream   Topical TID PRN Ross Prather MD        DOBUTamine 500mg in D5W 250mL infusion (premix) (NON-TITRATING)  5 mcg/kg/min (Dosing Weight) Intravenous Continuous Louise Monroy PA-C 16.4 mL/hr at 01/25/17 0900 5 mcg/kg/min at 01/25/17 0900    EPINEPHrine (ADRENALIN) 4 mg in sodium chloride 0.9% 250 mL infusion  0.04 mcg/kg/min (Dosing Weight) Intravenous Continuous Louis Jenkins MD 16.4 mL/hr at 01/25/17 0900 0.04 mcg/kg/min at 01/25/17 0900    famotidine tablet 20 mg  20 mg Oral BID Clovis Jimenez MD   20 mg at 01/24/17 2059    furosemide tablet 20 mg  20 mg Oral Daily Clovis Jimenez MD   20 mg at 01/24/17 0850    heparin 25,000 units in dextrose 5% 250 mL (100 units/mL) bolus from bag; ADDITIONAL PRN BOLUS  15 Units/kg (Order-Specific) Intravenous PRN Louise Monroy PA-C   30 Units at 01/25/17 0001    heparin 25,000 units in dextrose 5% 250 mL (100  "units/mL) infusion (heparin infusion)  17 Units/kg/hr (Order-Specific) Intravenous Continuous Comfort Pereira MD 20.2 mL/hr at 01/25/17 0900 24.048 Units/kg/hr at 01/25/17 0900    insulin aspart pen 0-5 Units  0-5 Units Subcutaneous QID (AC + HS) PRN Los De León MD        magnesium oxide tablet 400 mg  400 mg Oral BID Louise Monroy PA-C   400 mg at 01/24/17 2059    nitric oxide gas Gas 20 ppm  20 ppm Inhalation Continuous Louis Jenkins MD   20 ppm at 01/24/17 2030    nitroGLYCERIN SL tablet 0.4 mg  0.4 mg Sublingual Q5 Min PRN Louise Monroy PA-C   0.4 mg at 01/12/17 2253    ondansetron injection 8 mg  8 mg Intravenous Q6H PRN ROSEANNE Perera   8 mg at 01/24/17 0411    promethazine (PHENERGAN) 12.5 mg in dextrose 5 % 50 mL IVPB  12.5 mg Intravenous Q6H PRN ROSEANNE Perera 150 mL/hr at 01/25/17 0724 12.5 mg at 01/25/17 0724    senna-docusate 8.6-50 mg per tablet 1 tablet  1 tablet Oral BID Barak Wilkerson NP   1 tablet at 01/24/17 2059    sodium chloride 0.9% flush 10 mL  10 mL Intravenous Q6H Comfort Pereira MD   10 mL at 01/25/17 0507    And    sodium chloride 0.9% flush 10 mL  10 mL Intravenous PRN Comfort Pereira MD        vancomycin 1 g in dextrose 5 % 250 mL IVPB (ready to mix system)  1,000 mg Intravenous On Call Procedure Louise Monroy PA-C 166.7 mL/hr at 01/24/17 1729 1,000 mg at 01/24/17 1729       Gen: denies fever chills weight loss, +fatigue  HEENT: denies HA, blurry vision, tinnitus, dry mouth  Resp: denies cough, hemoptysis, +SOB  CV: Denies CP, palps, orthopnea, PND, edema  GI: denies abd pain, nausea/vomiting, diarrhea, melena, constipation, hematochezia      O:    Visit Vitals    BP (!) 99/59 (BP Location: Right arm, Patient Position: Lying, BP Method: Automatic)    Pulse (!) 131    Temp 97.7 °F (36.5 °C) (Axillary)    Resp (!) 25    Ht 5' 11" (1.803 m)    Wt 102 kg (224 lb 13.9 oz)    SpO2 100%    Breastfeeding No    BMI 31.36 kg/m2       Intake/Output " Summary (Last 24 hours) at 01/25/17 0917  Last data filed at 01/25/17 0900   Gross per 24 hour   Intake          1962.78 ml   Output             2480 ml   Net          -517.22 ml       Gen: at least mild distress this AM, A&Ox3, ill  HEENT: normocephalic, atraumatic, extraocular movements intact, pupils equal round reactive to light, moist mucous membranes  Neck:R Edwin in place  CV: tachy but regular rate, normal S1/2, no murmurs, rubs, gallops  Resp: clear to auscultation bilaterally, normal effort  GI: soft, nontender nondistended, normal bowel sounds  Ext: warm well perfused, +1 edema, no clubbing or cyanosis    Lab Results   Component Value Date    WBC 14.53 (H) 01/25/2017    HGB 10.2 (L) 01/25/2017    HCT 31.7 (L) 01/25/2017    MCV 76 (L) 01/25/2017     01/25/2017     BMP  Lab Results   Component Value Date     (L) 01/25/2017    K 4.0 01/25/2017    CL 96 01/25/2017    CO2 22 (L) 01/25/2017    BUN 18 01/25/2017    CREATININE 1.3 01/25/2017    CALCIUM 8.8 01/25/2017    ANIONGAP 13 01/25/2017    ESTGFRAFRICA 51.2 (A) 01/25/2017    EGFRNONAA 44.4 (A) 01/25/2017       Telemetry with ATach in 130s    A/P:  Ms. Baca is a 61 year old woman with NICM on  undergoing advanced therapies evaluation who is found to be in recurrent atrial tachycardia that is vagally responsive. Unable to tolerate BB or CCV due to inotrope-dependent NICM. Will likely need ICD as outpatient, pending recovery of systolic function. Decompensated overnight requiring IABP and likely to need advanced support. Holding PO this AM in case of procedure.     Plan:   --recommend amiodarone IV load, neel if getting more advanced support  --will likely need Lifevest at DC and f/u for ICD should her LVEF not recover  --appears very ill this AM    Further recommendations per attending addendum    Hair Calixto MD  PGY-4 [phone])  Cardiology Fellow

## 2017-01-25 NOTE — NURSING
HTS notified that [place] box was not reading an SVO2. VBG drawn and SVO2 31, HTS called to bedside. [place] box changed and repeat SVO2 drawn resulting 31 again. Patient symptomatic with continuous nausea and dry heaving. Orders placed, and repeat SVO2 ordered. Will continue to monitor.

## 2017-01-25 NOTE — PROGRESS NOTES
Pulmonary & Critical Care Medicine   Progress Note    Reason for Consultation: hypoxemia    Interval Hx: Patient with persistent nausea yesterday. SvO2 found to be 40% and IR Cards consulted for IABP placement. IABP placed but patient with further nausea in the afternoon and SvO2 noted to be 31. Epi and inhaled nitric added, as well as lasix 2/2 decreased UOP, w/ increase in SvO2 this am. She does however continue to complain of nausea.  Her O2 requirement increased from 0-5L overnight.     Past Medical History   Diagnosis Date    Apical mural thrombus 01/04/2017    NICM (nonischemic cardiomyopathy) 01/04/2017    NICM (nonischemic cardiomyopathy) 01/04/2017     History reviewed. No pertinent past surgical history.  Social History:   Social History     Social History    Marital status:      Spouse name: N/A    Number of children: N/A    Years of education: N/A     Occupational History    Not on file.     Social History Main Topics    Smoking status: Former Smoker     Packs/day: 0.25     Types: Cigarettes     Start date: 1/6/1977     Quit date: 9/6/2016    Smokeless tobacco: Not on file    Alcohol use Not on file    Drug use: Not on file    Sexual activity: Not on file     Other Topics Concern    Not on file     Social History Narrative     History reviewed. No pertinent family history.  Drug Allergies:   Review of patient's allergies indicates:  No Known Allergies  Current Infusions:   DOBUTamine 5 mcg/kg/min (01/25/17 0600)    epinephrine infusion (NON-TITRATING) 0.04 mcg/kg/min (01/25/17 0600)    heparin (porcine) in 5 % dex 24.048 Units/kg/hr (01/25/17 0600)    nitric oxide gas       Scheduled Medications:     amiodarone  200 mg Oral BID    cetirizine  5 mg Oral Daily    famotidine  20 mg Oral BID    furosemide  20 mg Oral Daily    magnesium oxide  400 mg Oral BID    magnesium sulfate IVPB  2 g Intravenous Once    senna-docusate 8.6-50 mg  1 tablet Oral BID    sodium chloride 0.9%   10 mL Intravenous Q6H     PRN Medications:   aluminum & magnesium hydroxide-simethicone, benzonatate, calcium carbonate, diphenhydrAMINE, diphenhydrAMINE-zinc acetate 1-0.1%, heparin (PORCINE), insulin aspart, nitroGLYCERIN, ondansetron, promethazine (PHENERGAN) IVPB, Flushing PICC Protocol **AND** sodium chloride 0.9% **AND** sodium chloride 0.9%, vancomycin (VANCOCIN) IVPB    Review of Systems:   A comprehensive 12-point review of systems was performed, and is negative except for those items mentioned above in the HPI section of this note.     Vital Signs:    Vitals:    01/25/17 0600   BP: (!) 105/58   Pulse: (!) 150   Resp: (!) 27   Temp:        Fluid Balance:     Intake/Output Summary (Last 24 hours) at 01/25/17 0712  Last data filed at 01/25/17 0600   Gross per 24 hour   Intake          1803.78 ml   Output             2785 ml   Net          -981.22 ml       Physical Exam:   General: NAD, cooperative & interactive.  HEENT: AT/NC, EOMI  Neck: Supple, no JVD, no masses or nodularities  Cardiac: Tachycardic in 130s; no murmurs appreciated  Respiratory: Normal inspection. Symmetric chest rise.  Auscultation clear bilaterally. No increased work of breathing noted.   Abdomen: Soft, NT/ND. +BS  Extremities: No visible atrophy. No clubbing or cyanosis on nail exam.    Skin: Warm and dry without visible rash.     Personal Review and Summary of Prior Diagnostics    Laboratory Studies:     Recent Labs  Lab 01/25/17  0508   PH 7.469*   PCO2 34.3*   PO2 24*   HCO3 24.9   POCSATURATED 46*   BE 1       Recent Labs  Lab 01/25/17  0336   WBC 14.53*   RBC 4.20   HGB 10.2*   HCT 31.7*      MCV 76*   MCH 24.3*   MCHC 32.2       Recent Labs  Lab 01/25/17  0336   *   K 4.0   CL 96   CO2 22*   BUN 18   CREATININE 1.3   MG 1.9       Microbiology Data:   Microbiology Results (last 7 days)     Procedure Component Value Units Date/Time    Urine culture [[id number]]     Order Status:  No result Specimen:  Urine     Blood  culture [[id number]]     Order Status:  No result Specimen:  Blood     Blood culture [[id number]]     Order Status:  No result Specimen:  Blood     Culture, Respiratory [[id number]]     Order Status:  No result Specimen:  Respiratory from Tracheal Aspirate     Urine culture [[id number]] Collected:  01/18/17 2223    Order Status:  Completed Specimen:  Urine from Urine, Clean Catch Updated:  01/20/17 0237     Urine Culture, Routine No growth        Summary of Chest Imaging Personally Reviewed: See above in HPI    2D Echo: 1/14/17  CONCLUSIONS     1 - Eccentric hypertrophy.     2 - Severely depressed left ventricular systolic function (EF 10-15%).     3 - Biatrial enlargement.     4 - Right ventricular enlargement with severely depressed systolic function.     5 - The estimated PA systolic pressure is 22 mmHg.     6 - Mild mitral regurgitation.     7 - Mild tricuspid regurgitation.     8 - Low central venous pressure.     9 - There is thrombus in the cardiac apex.     PFT's: 1/16/17:  INTERPRETATIONS:  Normal spirometry. Nitrogen washout lung volumes are normal. Normal diffusion  capacity. Normal MVV. Oximetry is normal.    Impression & Recommendations    Pt is a 62 y/o F with NICM with EF 10%, LV thrombus, DM, NSVT who is here undergoing workup for heart transplant with an abnormal CT scan    - image review reveals migrating infiltrates which appear to be non-infectious due to course and her clinical picture.  - unclear etiology of NICM though does not seem to be any clear correlation between the two.  - ESR 49, CRP uptrending 15-->21-->136-->149  - EMMANUEL, Rf, p-ANCA, c-ANCA, MPO and CCP Abs negative  - This is likely a non-infectious inflammatory process () but patient may require bronchoscopy in the future if cardiac function stabilizes to r/o any active infection in light of possible LVAD v. Transplant candidacy.  - At this point, given the severity of cardiac decompensation, patient may require intubation in the near  future. If she does become intubated, we will perform a lavage. However, at this point, she is too unstable for any invasive pulmonary testing.     Will follow, thank you for the consult.    Bethany Tuttle MD  PGY1

## 2017-01-25 NOTE — PROGRESS NOTES
Progress Note  Heart Transplant Service    Admit Date: 1/4/2017   LOS: 21 days     SUBJECTIVE:     Follow up for: Acute decompensated heart failure    Interval History: IABP placed yesterday, SVO2 dropped overnight into 30s. Epi and nitric added.    Scheduled Meds:   amiodarone  200 mg Oral BID    cetirizine  5 mg Oral Daily    famotidine  20 mg Oral BID    furosemide  20 mg Oral Daily    magnesium oxide  400 mg Oral BID    magnesium sulfate IVPB  2 g Intravenous Once    senna-docusate 8.6-50 mg  1 tablet Oral BID    sodium chloride 0.9%  10 mL Intravenous Q6H     Continuous Infusions:   DOBUTamine 5 mcg/kg/min (01/25/17 0500)    epinephrine infusion (NON-TITRATING) 0.04 mcg/kg/min (01/25/17 0500)    heparin (porcine) in 5 % dex 24.048 Units/kg/hr (01/25/17 0500)    nitric oxide gas       PRN Meds:aluminum & magnesium hydroxide-simethicone, benzonatate, calcium carbonate, diphenhydrAMINE, diphenhydrAMINE-zinc acetate 1-0.1%, heparin (PORCINE), insulin aspart, nitroGLYCERIN, ondansetron, promethazine (PHENERGAN) IVPB, Flushing PICC Protocol **AND** sodium chloride 0.9% **AND** sodium chloride 0.9%, vancomycin (VANCOCIN) IVPB    Review of patient's allergies indicates:  No Known Allergies    OBJECTIVE:     Vital Signs (Most Recent)  Temp: 97.8 °F (36.6 °C) (01/25/17 0300)  Pulse: (!) 150 (01/25/17 0600)  Resp: (!) 27 (01/25/17 0600)  BP: (!) 108/57 (01/25/17 0500)  SpO2: 99 % (01/25/17 0600)    Vital Signs Range (Last 24H):  Temp:  [97.6 °F (36.4 °C)-98.7 °F (37.1 °C)]   Pulse:  [124-150]   Resp:  [15-38]   BP: ()/(45-74)   SpO2:  [90 %-100 %]     I & O (Last 24H):    Intake/Output Summary (Last 24 hours) at 01/25/17 0601  Last data filed at 01/25/17 0500   Gross per 24 hour   Intake          1775.78 ml   Output             2760 ml   Net          -984.22 ml     PAP: (32-52)/(21-33) 52/33  PAP (Mean):  [28 mmHg-41 mmHg] 41 mmHg  CVP:  [5 mmHg-13 mmHg] 11 mmHg  CO:  [3.4 L/min-7.1 L/min] 5.4 L/min  CI:   [1.5 L/min/m2-3.1 L/min/m2] 2.4 L/min/m2    Telemetry: atrial tach 135 bpm with frequent PVC's    Constitutional: laying in bed NAD  Neck: Right CVC with swan enrique in place.   Cardiovascular: Tachycardic rate, regular rhythm and intact distal pulses. IABP 1:2  Pulmonary/Chest: No audible wheezes, rhonchi, rales anteriorly.   Abdominal: + BS, exhibits no distension. There is no tenderness. There is no rebound.   Extremities: no cyanosis, clubbing or edema. Toes cool to touch but not cold, otherwise warm and well perfused. Upper extremities with brisk capillary refill     Labs:     Recent Labs  Lab 01/23/17  0340 01/24/17  0252 01/25/17  0336   WBC 9.83 9.41 14.53*   HGB 10.3* 10.2* 10.2*   HCT 32.0* 31.7* 31.7*    287 315   LYMPH 28.6  2.8 25.1  2.4 16.3*  2.4   MONO 8.6  0.9 10.6  1.0 10.6  1.5*   EOSINOPHIL 2.5 2.3 0.7         Recent Labs  Lab 01/19/17  0528 01/20/17  0520 01/24/17  1212   INR 1.2 1.4* 1.4*          Recent Labs  Lab 01/23/17  0340  01/24/17  0252 01/24/17  0847 01/24/17  1212 01/24/17  1716 01/25/17  0336   GLU  --   < > 191* 147* 184* 122* 208*   CALCIUM  --   < > 8.9 9.0 8.8 8.7 8.8   ALBUMIN  --   < > 2.8* 2.7* 2.7* 2.7* 2.8*   PROT  --   < > 6.7 6.8 6.7 6.8 7.1   NA  --   < > 133* 132* 130* 133* 131*   K  --   < > 4.3 4.4 4.2 4.2 4.0   CO2  --   < > 26 25 27 23 22*   CL  --   < > 97 100 98 100 96   BUN  --   < > 15 15 16 16 18   CREATININE  --   < > 1.1 1.1 1.1 1.1 1.3   ALKPHOS  --   < > 41* 41* 40* 41* 44*   ALT  --   < > 10 8* 9* 11 10   AST  --   < > 13 14 13 15 15   BILITOT  --   < > 0.7 0.8 0.6 0.7 0.7   MG  --   < > 2.0 1.9  --  2.1 1.9   PHOS 3.5  --  4.4  --   --   --  4.1   < > = values in this interval not displayed.  Estimated Creatinine Clearance: 59.8 mL/min (based on Cr of 1.3).    No results for input(s): BNP, BNPTRIAGEBLO in the last 168 hours.    No results for input(s): LDH in the last 168 hours.    Microbiology Results (last 7 days)     Procedure Component  Value Units Date/Time    Urine culture [[id number]] Collected:  01/18/17 2226    Order Status:  Completed Specimen:  Urine from Urine, Clean Catch Updated:  01/20/17 0237     Urine Culture, Routine No growth            ASSESSMENT:     60 y/o with no PMHx, recently diagnosed with heart failure, [facility] with no evidence of CAD, transferred from [facility] in [place] for advanced heart failure consideration.    PLAN:     Acute on Chronic Combined Systolic and Diastolic Heart Failure, Diagnosed 11/2016 LVEDD 5.6 cm  Cardiogenic Shock  -NICM, NYHA IV, EF 15-20%, LVEDD: 5.6  -SVO2 dropped into 40s with persistent nausea, decision made for IABP placement  -SVO2 dropped overnight to 31%, nitric oxide added and epi @ 0.04  -IABP & swan placed 1/24/17. CVP 7 this am. Dose lasix PRN  -IABP 1:2 this am (with HR in 130s 1:1 does not augment better than 1:2). SVO2 46%  -On CXR IABP tip has moved distally, will ask interventional cardiology to review and adjust  -Will get arterial UE ultrasound for MCS sizing if deemed a candidate for upgraded support (impella 5.0)  -attempted GDMT with Lisinopril and Spironolactone but was d/c'ed 2/2 hypotension  - increased yesterday to 5 mcg/kg/min  -Colorectal surgery consulted for colonoscopy but was not performed due to medical instability. Also needs mammogram to complete w/u for OHTx/VAD. Too sick at this time for c-scope.   -Positive strongyloides lab (treated with Ivermectin)   -2 g Na dietary restriction, 1500 mL fluid restriction, strict I/Os     Atrial Tachycardia  -EP consulted, will add amiodarone 200 mg BID  -Consider IV amio, will defer to staff. Ep recommends amio IV load at this time given worsening clinical picture  -Will need lifevest at d/c and f/u for ICD     Leukocytosis  -WBC 14k this am, could be stress reaction but also could be infectious (CRP also trending upwards). Possible RML consolidation present on CXR.  -Panculture, remove PICC line and start broad  spectrum abx    Hypoxemia, improved  - Pulmonary consulted, appreciate recs. Discussed stress dose steroids, but will hold off for now given infectious concerns  -Lung Nodule, found incidentally on CT; plan for repeat CT in 6-12 months     Chest Pain, resolved  -Suspect it is non-cardiac, she has been chest pain free   -Troponins negative, EKG without ischemic changes  -Will continue to monitor    LV Apical Thrombus  -Heparin infusion. Defer PO anticoagulation until plan made re: OHTx/VAD  -Present on repeat 2D echo 1/14/17  -Repeat echo today    NSVT/VT  -Will monitor closely on Dobutamine    DM  -Appreciate Endocrinology consultation  -HbgA1c: 7.6  -poct glucose AC/HS with low dose correction insulin    Pruritus  -Switch benadryl to zyrtec today with Pepcid  -Dermatology consulted appreciate odilon

## 2017-01-25 NOTE — PROGRESS NOTES
Pt intubated with 8.0 et tube @ 22cm at the lip. Et tube placement confirmed. Pt placed on 840 vent with settings shown on flowsheet.

## 2017-01-25 NOTE — COMMITTEE REVIEW
Vaishnavi Baca's case was presented to the heart transplant selection committee on  1/25/2017. Her case was discussed at length and in great detail.  Decision of this committee for which I serve as scribe is as follows:   Patient has been Denied the option of Heart Transplant as is too ill for transplant.  May continue evaluation for Mechanical Circulatory Support     Note was written by Jenni Gould.    ==========================================================    I was present at the meeting and attest to the decision of the committee

## 2017-01-26 NOTE — PROGRESS NOTES
UPDATE    With approval from cardiology manager, SW reserved 2 nights' stay in the [facility] for pt's family. SW notified pt's  of same. SW providing psychosocial and counseling support, education, resources, and d/c planning as needed. SW continuing to follow and remains available.

## 2017-01-26 NOTE — PROGRESS NOTES
Pt hypotensive with MAP of 55-60 via Sandrine and IABP. MD kong notified. Epi drip increased per order. Will continue to monitor.

## 2017-01-26 NOTE — NURSING
HTS called regarding hypotension, with MAP's hanging around 60. Previously, the patient had MAP's between 65-70. Propofol titrated down as tolerated, and no new orders placed at this time. Will continue to monitor.

## 2017-01-26 NOTE — PROGRESS NOTES
Progress Note  Heart Transplant Service    Admit Date: 1/4/2017   LOS: 22 days     SUBJECTIVE:     Follow up for: Acute decompensated heart failure    Interval History: Patient intubated/sedated yesterday afternoon. Patient febrile overnight without much improvement in temperatures with tylenol. Oliguric, net positive 1.8 L.  at bedside, questions answered.     Scheduled Meds:   ceFEPime (MAXIPIME) IVPB  1 g Intravenous Q8H    cetirizine  5 mg Oral Daily    chlorhexidine  15 mL Mouth/Throat BID    furosemide  80 mg Intravenous Once    magnesium oxide  400 mg Oral BID    pantoprazole  40 mg Intravenous Daily    senna-docusate 8.6-50 mg  1 tablet Oral BID    vancomycin (VANCOCIN) IVPB  1,000 mg Intravenous Q12H     Continuous Infusions:   amiodarone 0.5 mg/min (01/26/17 0600)    DOBUTamine 5 mcg/kg/min (01/26/17 0632)    epinephrine infusion (NON-TITRATING) 0.04 mcg/kg/min (01/26/17 0600)    fentanyl 5 mL/hr at 01/26/17 0600    furosemide (LASIX) 5 mg/mL infusion (non-titrating) 5 mg/hr (01/26/17 0600)    heparin (porcine) in 5 % dex 25.952 Units/kg/hr (01/26/17 0600)    nitric oxide gas      propofol 15 mcg/kg/min (01/26/17 0630)     PRN Meds:acetaminophen, aluminum & magnesium hydroxide-simethicone, benzonatate, calcium carbonate, diphenhydrAMINE, diphenhydrAMINE-zinc acetate 1-0.1%, heparin (PORCINE), insulin aspart, nitroGLYCERIN, ondansetron, promethazine (PHENERGAN) IVPB    Review of patient's allergies indicates:  No Known Allergies    OBJECTIVE:     Vital Signs (Most Recent)  Temp: 98.7 °F (37.1 °C) (01/26/17 0300)  Pulse: (!) 115 (01/26/17 0600)  Resp: 15 (01/26/17 0600)  BP: (!) 94/55 (01/26/17 0400)  SpO2: 100 % (01/26/17 0600)    Vital Signs Range (Last 24H):  Temp:  [98.5 °F (36.9 °C)-101 °F (38.3 °C)]   Pulse:  [111-170]   Resp:  [14-37]   BP: ()/(50-65)   SpO2:  [94 %-100 %]   Arterial Line BP: ()/(41-51)     I & O (Last 24H):    Intake/Output Summary (Last 24 hours)  at 01/26/17 0704  Last data filed at 01/26/17 0600   Gross per 24 hour   Intake          3093.36 ml   Output             1067 ml   Net          2026.36 ml     PAP: (39-53)/(22-35) 39/25  PAP (Mean):  [30 mmHg-42 mmHg] 31 mmHg  CVP:  [7 mmHg-16 mmHg] 11 mmHg  CO:  [3 L/min-5.7 L/min] 3.6 L/min  CI:  [1.3 L/min/m2-2.5 L/min/m2] 1.5 L/min/m2    Telemetry: atrial tach 125 bpm    Constitutional: intubated/sedated  Neck: Right CVC with swan enrique in place.   Cardiovascular: Tachycardic rate, regular rhythm and intact distal pulses. IABP 1:2  Pulmonary/Chest: Intubated/sedated. No audible wheezes, rhonchi, rales anteriorly.   Abdominal: + BS, exhibits no distension.  Extremities: no cyanosis, clubbing or edema. Feet cold to touch bilaterally. Upper extremities with delayed capillary refill     Labs:     Recent Labs  Lab 01/24/17  0252 01/25/17  0336 01/26/17  0309   WBC 9.41 14.53* 16.63*   HGB 10.2* 10.2* 9.7*   HCT 31.7* 31.7* 29.9*    315 278   LYMPH 25.1  2.4 16.3*  2.4 22.2  3.7   MONO 10.6  1.0 10.6  1.5* 11.6  1.9*   EOSINOPHIL 2.3 0.7 3.2         Recent Labs  Lab 01/20/17  0520 01/24/17  1212   INR 1.4* 1.4*          Recent Labs  Lab 01/24/17  0252  01/25/17  0336 01/25/17  0819  01/25/17  1829 01/25/17  2239 01/26/17  0309   *  < > 208* 243*  < > 231* 166* 168*   CALCIUM 8.9  < > 8.8 9.0  < > 8.6* 8.8 8.6*   ALBUMIN 2.8*  < > 2.8* 2.8*  < > 2.6* 2.7* 2.6*   PROT 6.7  < > 7.1 7.3  < > 6.9 7.0 6.7   *  < > 131* 131*  < > 129* 131* 131*   K 4.3  < > 4.0 4.0  < > 4.2 4.0 3.8   CO2 26  < > 22* 23  < > 23 22* 22*   CL 97  < > 96 95  < > 95 98 97   BUN 15  < > 18 19  < > 21 24* 25*   CREATININE 1.1  < > 1.3 1.4  < > 1.7* 1.8* 1.6*   ALKPHOS 41*  < > 44* 45*  < > 40* 44* 43*   ALT 10  < > 10 9*  < > 8* 8* 8*   AST 13  < > 15 16  < > 12 17 13   BILITOT 0.7  < > 0.7 0.7  < > 0.7 0.6 0.7   MG 2.0  < > 1.9 2.7*  --  2.1  --  2.3   PHOS 4.4  --  4.1  --   --   --   --  4.9*   < > = values in this  interval not displayed.  Estimated Creatinine Clearance: 48.6 mL/min (based on Cr of 1.6).    No results for input(s): BNP, BNPTRIAGEBLO in the last 168 hours.    No results for input(s): LDH in the last 168 hours.    Microbiology Results (last 7 days)     Procedure Component Value Units Date/Time    Culture, Respiratory [[id number]] Collected:  01/25/17 1826    Order Status:  Completed Specimen:  Respiratory from Tracheal Aspirate Updated:  01/25/17 2147     Gram Stain (Respiratory) <10 epithelial cells per low power field.     Gram Stain (Respiratory) Rare WBC's     Gram Stain (Respiratory) No organisms seen    Blood culture [[id number]] Collected:  01/25/17 0818    Order Status:  Completed Specimen:  Blood from Peripheral, Antecubital, Left Updated:  01/25/17 1915     Blood Culture, Routine No Growth to date    Narrative:       peripheral    Blood culture [[id number]] Collected:  01/25/17 0830    Order Status:  Completed Specimen:  Blood from Line, PICC Right Brachial Updated:  01/25/17 1915     Blood Culture, Routine No Growth to date    Narrative:       PICC    IV catheter culture [[id number]]     Order Status:  Canceled Specimen:  Catheter Tip from Catheter Tip, PICC     Culture, Respiratory [[id number]]     Order Status:  Canceled Specimen:  Respiratory from Sputum, Expectorated     Urine culture [[id number]] Collected:  01/25/17 0818    Order Status:  Sent Specimen:  Urine from Urine, Catheterized Updated:  01/25/17 0936    Culture, Respiratory [[id number]]     Order Status:  Canceled Specimen:  Respiratory from Tracheal Aspirate     Urine culture [[id number]] Collected:  01/18/17 2223    Order Status:  Completed Specimen:  Urine from Urine, Clean Catch Updated:  01/20/17 0237     Urine Culture, Routine No growth            ASSESSMENT:     62 y/o with no PMHx, recently diagnosed with heart failure, [facility] with no evidence of CAD, transferred from [facility] in [place] for advanced heart failure  consideration.    PLAN:     Acute on Chronic Combined Systolic and Diastolic Heart Failure, Diagnosed 11/2016 LVEDD 5.6 cm  Cardiogenic Shock  -NICM, NYHA IV, EF 15-20%, LVEDD: 5.6  -This am supported with IABP 1:2, epi @ 0.06,  5 mcg/kg/min, nitric @ 20 ppm. CVP 14-16, SVO2 43%. CO/CO ~2/4, SVR 800s.Added low dose vaso @ 0.04 this am.   -Lasix gtt increased from 5 mg/hr to 15 mg/hr, diuril 250 mg IV added with marginal increase in UOP initially.   -Decreased nitric to 10 ppm in light of SVR of 800 (in setting of cardiogenic and vasodilatory shock), with repeat SVO2 38%, CVP 11 with improvement in SVR to 1100.  -IABP changed 1:1 given improvement in HR to 110s, cooling blanket added for fever. UOP improving this afternoon, MAPs maintaining in 60s & , will continue to monitor.  -Daily CXR for IABP placement. Daily SVO2  -Colorectal surgery consulted for colonoscopy but was not performed due to medical instability. Also needs mammogram to complete w/u for OHTx/VAD. Too sick at this time for c-scope.   -Positive strongyloides lab (treated with Ivermectin)   -2 g Na dietary restriction, 1500 mL fluid restriction, strict I/Os     Atrial Tachycardia  -IV amiodarone started yesterday. Continue 0.5 mg/min  -Will need lifevest at d/c and f/u for ICD     Leukocytosis, Fever  -Pancultured  -Vanc & cefepime started yesterday  -Appears to be a mixed vasodilatory and cardiogenic shock picture.   -Consult ID for abx recommendations  -Pulmonary will bronch patient tomorrow if patient stable    Chest Pain, resolved  -Suspect it is non-cardiac, she has been chest pain free   -Troponins negative, EKG without ischemic changes  -Will continue to monitor    LV Apical Thrombus  -Heparin infusion. Defer PO anticoagulation until plan made re: OHTx/VAD  -Present on repeat 2D echo 1/14/17    DM  -Appreciate Endocrinology consultation  -HbgA1c: 7.6  -poct glucose AC/HS with low dose correction insulin    Per family discussion at 1400,  , daughter & sister plan to stay in [facility] and will meet in the morning to discuss patient's condition and plan going forward. Prognosis remains guarded, but at this time she remains full code.

## 2017-01-26 NOTE — PROGRESS NOTES
[facility]  Adult Nutrition  Progress Note    SUMMARY     Recommendations    Recommendation/Intervention:   1. Once medically appropriate to advance diet, recommend low Na with texture per SLP   2. If pt remains intubated on propofol, recommend initiating TF regimen of Novasource with goal rate @ 35 ml/hr + 6 packets of Beneprotein as tolerated    - once propofol d/c, recommend adjusting TF to Novasource with goal rate @ 45 ml/hr + 2 packets of Beneprotein as tolerated   3. RD to monitor / follow-up     Goals: Pt to receive nutrition in the next 24-48 hrs  Nutrition Goal Status: new    Communication of RD Recs: reviewed with RN    Reason for Assessment    Reason for Assessment: RD follow-up  Diagnosis: cardiac disease (LVAD/OHTx w/u)  Relevent Medical History: HF   Interdisciplinary Rounds: attended     General Information Comments: Pt s/p intubation, w/ IABP.     Nutrition discharge planning: all education needs addressed, RD to re-evaluate s/p extubation    Nutrition Prescription Ordered    Current Diet Order: NPO    Oral Nutrition Supplement: -     Nutrition Risk Screen     Nutrition Risk Screen: no indicators present    Nutrition/Diet History    Typical Food/Fluid Intake: Pt currently NPO with no alternate means of nutrition. I/O +1843 ml, LBM 1/20     Factors Affecting Nutritional Intake: NPO, on mechanical ventilation    Labs/Tests/Procedures/Meds       Pertinent Labs Reviewed: reviewed  Pertinent Labs Comments: Na 130, phosp 4.9, BUN 27, Cr 1.8  Pertinent Medications Reviewed: reviewed  Pertinent Medications Comments: furosemide, senna, vancomycin, fentanyl, lasix, epinephrine, vasopressin, propofol @ 13.1 ml/hr    Physical Findings    Overall Physical Appearance: overweight  Tubes: orogastric tube  Oral/Mouth Cavity: WDL  Skin: intact    Anthropometrics       Height (inches): 70.98 in  Weight Method: Bed Scale  Weight (kg): 99.3 kg     Ideal Body Weight (IBW), Female: 154.9 lb     % Ideal  Body Weight, Female (lb): 142.89 lb    BMI (kg/m2): 30.88  BMI Grade: 30 - 34.9- obesity - grade I     Estimated/Assessed Needs    Weight Used For Calorie Calculations: 100.4 kg (221 lb 5.5 oz)   Height (cm): 180.3 cm     Energy Need Method: Haim State (2065 kcal/day)        RMR (Grafton-St. Jeor Equation): 1668.75        Weight Used For Protein Calculations: 70.5 kg (155 lb 6.8 oz)  Protein Requirements: 105 - 140 gm/day  1.5 gm Protein (gm): 105.97 and 2.0 gm Protein (gm): 141.29    Fluid Need Method: RDA Method (or per MD)    Malnutrition (Undernutrition) Diagnosis    % Intake of Estimated Energy Needs: 0 - 25%  % Meal Intake: NPO    Nutrition Diagnosis    Nutrition Problem: Increased nutrient needs (protein)  Etiology/Related To: physiological need  Nutrition Diagnosis Signs/Symptoms As Evidenced By: HF, LVAD/OHTx w/u  Nutrition Diagnosis Status: Continues    Monitor and Evaluation    Food and Nutrient Intake: energy intake, enteral nutrition intake  Food and Nutrient Adminstration: diet order, enteral and parenteral nutrition administration  Anthropometric Measurements: weight, weight change  Biochemical Data, Medical Tests and Procedures: other (specify) (All labs)  Nutrition-Focused Physical Findings: overall appearance    Nutrition Risk    Level of Risk: high    Nutrition Follow-Up    RD Follow-up?: Yes

## 2017-01-26 NOTE — PLAN OF CARE
Problem: Patient Care Overview  Goal: Plan of Care Review  Outcome: Ongoing (interventions implemented as appropriate)  Pt intubated and sedated on fentanyl and propofol for comfort and pain. Pt remains with balloon pump to right groin; 1:2 ecg. Amio infusing -130. POC discussed with patient and family.

## 2017-01-26 NOTE — SIGNIFICANT EVENT
Patient with persistently poor UOP    - Lasix 80mg IVP x 1  - Lasix GTT @ 10  - CXR to assess IABP placement    Signed:    Louis Jenkins MD  Cardiology Fellow, PGY-5  Pager: [phone]  1/25/2017 8:26 PM

## 2017-01-26 NOTE — SIGNIFICANT EVENT
MAP trending downward. Now 55.    - Increase epi to 0.06    Signed:    Louis Jenkins MD  Cardiology Fellow, PGY-5  Pager: [phone]  1/26/2017 6:49 AM

## 2017-01-26 NOTE — PROCEDURES
ARTERIAL LINE (A-Line) PLACEMENT     Indication: Hemodynamic monitoring    Informed consent was obtained from the patient. A time-out was completed verifying correct patient, procedure, site, positioning, and special equipment if applicable. An Miguelito's test was performed and it was normal. The patients RIGHT wrist was prepped and draped in sterile fashion. 1% Lidocaine was used to anesthetize the area. An arrow kit was used to access the radial artery. Appropriate blood return was achieved and a guidewire was inserted without resistance. The 20 gauge catheter was threaded without resistance. The guidewire was removed and appropriate pulsatile blood return was observed. The catheter was sutured in place to the skin and a sterile dressing applied. Perfusion to the extremity distal to the point of catheter insertion was checked and found to be adequate.     The patient tolerated the procedure well and there were no complications.    Signed:    Louis Jenkins MD  Cardiology Fellow, PGY-5  Pager: [phone]  1/25/2017 8:24 PM

## 2017-01-26 NOTE — PROGRESS NOTES
PA at bedside. Scant urine output since shift start. 80 IV lasix bolus given per order. Lasix drip increased to 15.  Will continue to monitor

## 2017-01-26 NOTE — PT/OT/SLP PROGRESS
Physical Therapy      Vaishnavi Baca  MRN: [id number]    Patient not seen today secondary to  (pt failed MOVE screen and not appropriate for ROM.). Will follow-up as appropriate.    Melisa Perdomo, PT

## 2017-01-26 NOTE — PLAN OF CARE
Problem: Patient Care Overview  Goal: Plan of Care Review  Outcome: Ongoing (interventions implemented as appropriate)  VS and assessment per flow sheet, patient progressing towards goals as tolerated. Patient with minimal UO and CVP up to 16, lasix drip and push given with some improvement in UO and CVP. A line placed with no complications, MAP's maintaining most of the night. Propofol and fentanyl given for sedation, however patient still easily agitated when stimulated. SVO2 43 this AM. Does not follow commands, however localizes pain and pupils are brisk, equal, and reactive. Vent settings weaned as tolerated. Plan of care reviewed with Vaishanvi Baca and family, all concerns addressed, will continue to monitor.

## 2017-01-26 NOTE — PROGRESS NOTES
Pt did not respond to 80mg IV lasix given s/p intubation, scant urine out, pt has moderate agitation- propofol and fentanyl used for sedation. IABP map decreased at 59-65. MD Jenkins notified. Plan to place Sandrine, awaiting husbands consent. No new orders at this time.

## 2017-01-26 NOTE — PROGRESS NOTES
MD rounds complete. Pt continues to have scant urine output. Plan to add diuril. Vasopressin added goal MAP >60. Will continue to monitor

## 2017-01-26 NOTE — PROGRESS NOTES
Pt intubated per MD Pozo and HTS team at ~1700 by anesthesia & RT. Etomidate and Versed used per orders, pt agitated- comfort provided- plan to initiate sedation awaiting orders. Restraints applied for pt safety. OGT placed to LIWS, scant output, chest xray complete. Pt has low urine out. Plan to give IV lasix to reassess. Amio loaded and infusion order received. Continuing to monitor closely for changes.  updated on POC.

## 2017-01-26 NOTE — MEDICAL/APP STUDENT
Infectious Disease Consult Note:  Admit Date: 1/4/2017   LOS: 20 days       Reason for Consult: Fever and leukocytosis    Interval Hx    Patient decompensated yesterday and was intubated overnight w/ further drops in MAP requiring increasing pressor support.  IABP was inserted on 1/24/17. R PICC was changed on 1/25/17. Patient had temperature spike to 101F yesterday, when she had previously been afebrile.     History of Present Illness:  Vaishnavi Baca is a 61 y.o. year old female with NICM on dobutamine infusion, with no previous known medical conditions. Patient was transferred here from [facility] in [place] for heart transplant workup. Patient was htreated for pneumonia and suspected stomach virus in Nov 16 and Dec 16 at which point her heart failure became apparent. 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  Patient's family denies any recent travel history. 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 Her sick contacts include her , who currently has pneumonia.     Review of Symptoms:  Unable to obtain      Past Medical History:  Past Medical History   Diagnosis Date    Apical mural thrombus 01/04/2017    NICM (nonischemic cardiomyopathy) 01/04/2017    NICM (nonischemic cardiomyopathy) 01/04/2017       Past Surgical History:  History reviewed. No pertinent past surgical history.    Family History:  History reviewed. No pertinent family history.      Social History:  Social History     Social History    Marital status:      Spouse name: N/A    Number of children: N/A    Years of education: N/A     Occupational History    Not on file.     Social History Main Topics    Smoking status: Former Smoker     Packs/day: 0.25     Types: Cigarettes     Start date: 1/6/1977     Quit date: 9/6/2016    Smokeless tobacco: Not on file    Alcohol use Not on file    Drug use: Not on file     Sexual activity: Not on file     Other Topics Concern    Not on file     Social History Narrative       Allergies:  Review of patient's allergies indicates:  No Known Allergies    Pertinent Medications:  Antibiotics:   Antibiotics     Start     Stop Route Frequency Ordered    01/25/17 1200  vancomycin 1 g in dextrose 5 % 250 mL IVPB (ready to mix system)  (Vancomycin IVPB with levels panel)      -- IV Every 12 hours (non-standard times) 01/25/17 1056    01/25/17 1200  cefepime in dextrose 5 % 1 gram/50 mL IVPB 1 g      -- IV Every 8 hours (non-standard times) 01/25/17 1056          Physical Exam:  Temp:  [98.7 °F (37.1 °C)-103.2 °F (39.6 °C)]   Pulse:  [102-170]   Resp:  [14-45]   BP: ()/(50-72)   SpO2:  [94 %-100 %]   Arterial Line BP: ()/(32-51)       Gen: intubated, sedated, looks ill  CV: tachycardic in 120s. normal S1/2, no murmurs  Resp: intubated, but clear to auscultation bilaterally  GI: soft, nontender nondistended, normal bowel sounds  Extremities: not well perfused  Skin: clammy and dry    Lines:           IABP 01/24/17 1500 7.5 Fr. 40 mL (Active)   Site Assessment Clean;Dry;Intact 1/26/2017  3:00 PM   Helium Tubing Clear 1/26/2017  3:00 PM   Arterial Line Status Arterial fluids per protocol 1/26/2017  3:00 PM   Arterial Line Interventions Leveled 1/26/2017  3:00 PM   Positioning HOB up 15 degrees 1/26/2017  3:00 PM   Dressing Occlusive 1/26/2017  3:00 PM   Dressing Status Clean;Dry;Intact 1/26/2017  3:00 PM   Dressing Intervention Dressing changed 1/25/2017  6:00 AM   Dressing Change Due 02/01/17 1/26/2017  3:00 PM   Color/Movement/Sensation Capillary refill less than 3 sec 1/26/2017  3:00 PM   Number of days:2       Labs:  CBC:     Recent Labs  Lab 01/24/17  0252 01/25/17  0336 01/26/17  0309   WBC 9.41 14.53* 16.63*   HGB 10.2* 10.2* 9.7*   HCT 31.7* 31.7* 29.9*    315 278   MONO 10.6  1.0 10.6  1.5* 11.6  1.9*       BMP:     Recent Labs  Lab 01/26/17  0309 01/26/17  0632  01/26/17  1335   * 130* 131*   K 3.8 5.0 5.0   CL 97 98 97   CO2 22* 22* 20*   BUN 25* 27* 31*   CREATININE 1.6* 1.8* 2.3*   CALCIUM 8.6* 8.7 8.6*           LFT: Lab Results   Component Value Date    ALT 9 (L) 01/26/2017    AST 13 01/26/2017    ALKPHOS 44 (L) 01/26/2017    BILITOT 0.7 01/26/2017         Microbiology x 7d:   Microbiology Results (last 7 days)     Procedure Component Value Units Date/Time    Urine culture [[id number]] Collected:  01/25/17 0818    Order Status:  Completed Specimen:  Urine from Urine, Catheterized Updated:  01/26/17 1245     Urine Culture, Routine No growth    Blood culture [[id number]] Collected:  01/25/17 0818    Order Status:  Completed Specimen:  Blood from Peripheral, Antecubital, Left Updated:  01/26/17 1222     Blood Culture, Routine No Growth to date     Blood Culture, Routine No Growth to date    Narrative:       peripheral    Blood culture [[id number]] Collected:  01/25/17 0830    Order Status:  Completed Specimen:  Blood from Line, PICC Right Brachial Updated:  01/26/17 1222     Blood Culture, Routine No Growth to date     Blood Culture, Routine No Growth to date    Narrative:       PICC    Culture, Respiratory [[id number]] Collected:  01/25/17 1826    Order Status:  Completed Specimen:  Respiratory from Tracheal Aspirate Updated:  01/26/17 1035     Respiratory Culture Normal respiratory dayami     Gram Stain (Respiratory) <10 epithelial cells per low power field.     Gram Stain (Respiratory) Rare WBC's     Gram Stain (Respiratory) No organisms seen    IV catheter culture [[id number]]     Order Status:  Canceled Specimen:  Catheter Tip from Catheter Tip, PICC     Culture, Respiratory [[id number]]     Order Status:  Canceled Specimen:  Respiratory from Sputum, Expectorated     Culture, Respiratory [[id number]]     Order Status:  Canceled Specimen:  Respiratory from Tracheal Aspirate     Urine culture [[id number]] Collected:  01/18/17 2223    Order Status:  Completed Specimen:   Urine from Urine, Clean Catch Updated:  01/20/17 0237     Urine Culture, Routine No growth            Imaging:  chest CT scan: peripheral infiltrates    Assessment and Plan:    1. SIRS / Sepsis  -temperature of 101F, previously afebrile  - WBC 16.6 elevated from (14.5->9.4)  -fever could be due to infectious etiology as patient has been in hospital for extended period  -currently on vanc and cefepime for empiric coverage  -vanc dosing should be renally adjusted due to current renal function  -add fluconazole for fungal coverage  -consider switching to zosyn from cefepime for broader spectrum coverage  -procalcitonin in process  -SIRS 4/4 (T101F, WBC 16.6, RR 23, )  -infectious source has not been determined, although sx could be due to cardiogenic shock  -possible source:    -IABP was inserted 1/24/17   -PICC was changed 1/25/17   -Tiwari    -pulmonary lesion seen on most recent CT  -cultures (blood, urine) are in process    Gi Peña, MS-4

## 2017-01-26 NOTE — PROGRESS NOTES
Pulmonary & Critical Care Medicine   Progress Note    Reason for Consultation: hypoxemia    Interval Hx: Patient intubated overnight w/ further drops in MAP requiring increasing pressor support.     Past Medical History   Diagnosis Date    Apical mural thrombus 01/04/2017    NICM (nonischemic cardiomyopathy) 01/04/2017    NICM (nonischemic cardiomyopathy) 01/04/2017     History reviewed. No pertinent past surgical history.  Social History:   Social History     Social History    Marital status:      Spouse name: N/A    Number of children: N/A    Years of education: N/A     Occupational History    Not on file.     Social History Main Topics    Smoking status: Former Smoker     Packs/day: 0.25     Types: Cigarettes     Start date: 1/6/1977     Quit date: 9/6/2016    Smokeless tobacco: Not on file    Alcohol use Not on file    Drug use: Not on file    Sexual activity: Not on file     Other Topics Concern    Not on file     Social History Narrative     History reviewed. No pertinent family history.  Drug Allergies:   Review of patient's allergies indicates:  No Known Allergies  Current Infusions:   amiodarone 0.5 mg/min (01/26/17 1000)    DOBUTamine 5 mcg/kg/min (01/26/17 1000)    epinephrine infusion (NON-TITRATING)      fentanyl 10 mL/hr at 01/26/17 1000    furosemide (LASIX) 5 mg/mL infusion (non-titrating) 15 mg/hr (01/26/17 1000)    heparin (porcine) in 5 % dex 25.952 Units/kg/hr (01/26/17 1000)    nitric oxide gas      propofol 14.944 mcg/kg/min (01/26/17 1000)    vasopressin (PITRESSIN) infusion 0.04 Units/min (01/26/17 1000)     Scheduled Medications:     ceFEPime (MAXIPIME) IVPB  1 g Intravenous Q8H    cetirizine  5 mg Oral Daily    chlorhexidine  15 mL Mouth/Throat BID    magnesium oxide  400 mg Oral BID    pantoprazole  40 mg Intravenous Daily    senna-docusate 8.6-50 mg  1 tablet Oral BID    vancomycin (VANCOCIN) IVPB  1,000 mg Intravenous Q12H     PRN Medications:    acetaminophen, aluminum & magnesium hydroxide-simethicone, benzonatate, calcium carbonate, diphenhydrAMINE, diphenhydrAMINE-zinc acetate 1-0.1%, heparin (PORCINE), insulin aspart, nitroGLYCERIN, ondansetron, promethazine (PHENERGAN) IVPB    Review of Systems:   A comprehensive 12-point review of systems was performed, and is negative except for those items mentioned above in the HPI section of this note.     Vital Signs:    Vitals:    01/26/17 1000   BP:    Pulse: (!) 123   Resp: (!) 30   Temp:        Fluid Balance:     Intake/Output Summary (Last 24 hours) at 01/26/17 1047  Last data filed at 01/26/17 1000   Gross per 24 hour   Intake          3101.44 ml   Output              972 ml   Net          2129.44 ml       Physical Exam:   General: Intubated, sedated but arousable.  Neck: Supple, no JVD, no masses or nodularities  Cardiac: Tachycardic in 120s; no murmurs appreciated  Respiratory: Intubated but clear to auscultation bilaterally   Abdomen: Soft, NT/ND. +BS  Extremities: No visible atrophy. No clubbing or cyanosis on nail exam.    Skin: Warm and dry without visible rash.     Personal Review and Summary of Prior Diagnostics    Laboratory Studies:     Recent Labs  Lab 01/26/17  0529   PH 7.390   PCO2 40.6   PO2 172*   HCO3 24.6   POCSATURATED 100   BE 0       Recent Labs  Lab 01/26/17  0309   WBC 16.63*   RBC 4.06   HGB 9.7*   HCT 29.9*      MCV 74*   MCH 23.9*   MCHC 32.4       Recent Labs  Lab 01/26/17  0309 01/26/17  0632   * 130*   K 3.8 5.0   CL 97 98   CO2 22* 22*   BUN 25* 27*   CREATININE 1.6* 1.8*   MG 2.3  --        Microbiology Data:   Microbiology Results (last 7 days)     Procedure Component Value Units Date/Time    Culture, Respiratory [[id number]] Collected:  01/25/17 1826    Order Status:  Completed Specimen:  Respiratory from Tracheal Aspirate Updated:  01/26/17 1035     Respiratory Culture Normal respiratory dayami     Gram Stain (Respiratory) <10 epithelial cells per low power  field.     Gram Stain (Respiratory) Rare WBC's     Gram Stain (Respiratory) No organisms seen    Blood culture [[id number]] Collected:  01/25/17 0818    Order Status:  Completed Specimen:  Blood from Peripheral, Antecubital, Left Updated:  01/25/17 1915     Blood Culture, Routine No Growth to date    Narrative:       peripheral    Blood culture [[id number]] Collected:  01/25/17 0830    Order Status:  Completed Specimen:  Blood from Line, PICC Right Brachial Updated:  01/25/17 1915     Blood Culture, Routine No Growth to date    Narrative:       PICC    IV catheter culture [[id number]]     Order Status:  Canceled Specimen:  Catheter Tip from Catheter Tip, PICC     Culture, Respiratory [[id number]]     Order Status:  Canceled Specimen:  Respiratory from Sputum, Expectorated     Urine culture [[id number]] Collected:  01/25/17 0818    Order Status:  Sent Specimen:  Urine from Urine, Catheterized Updated:  01/25/17 0936    Culture, Respiratory [[id number]]     Order Status:  Canceled Specimen:  Respiratory from Tracheal Aspirate     Urine culture [[id number]] Collected:  01/18/17 2223    Order Status:  Completed Specimen:  Urine from Urine, Clean Catch Updated:  01/20/17 0237     Urine Culture, Routine No growth        Summary of Chest Imaging Personally Reviewed: See above in HPI    2D Echo: 1/14/17  CONCLUSIONS     1 - Eccentric hypertrophy.     2 - Severely depressed left ventricular systolic function (EF 10-15%).     3 - Biatrial enlargement.     4 - Right ventricular enlargement with severely depressed systolic function.     5 - The estimated PA systolic pressure is 22 mmHg.     6 - Mild mitral regurgitation.     7 - Mild tricuspid regurgitation.     8 - Low central venous pressure.     9 - There is thrombus in the cardiac apex.     PFT's: 1/16/17:  INTERPRETATIONS:  Normal spirometry. Nitrogen washout lung volumes are normal. Normal diffusion  capacity. Normal MVV. Oximetry is normal.    Impression & Recommendations    Pt  is a 60 y/o F with NICM with EF 10%, LV thrombus, DM, NSVT who is here undergoing workup for heart transplant with an abnormal CT scan    - image review reveals migrating infiltrates which appear to be non-infectious due to course and her clinical picture.  - unclear etiology of NICM though does not seem to be any clear correlation between the two.  - ESR 49, CRP uptrending 15-->21-->136-->149  - EMMANUEL, Rf, p-ANCA, c-ANCA, MPO and CCP Abs negative  - This is likely a non-infectious inflammatory process () but patient may require bronchoscopy in the future if cardiac function stabilizes to r/o any active infection in light of possible LVAD v. Transplant candidacy.  - At this point, given the severity of cardiac decompensation, will hold off on any invasive pulmonary testing as patient is too unstable at this point.     Will follow, thank you for the consult.    Bethany Tuttle MD  PGY1

## 2017-01-26 NOTE — PROGRESS NOTES
UPDATE    SW to pt's room for update. Pt presents as aaox3 with calm affect. Pt reports coping adequately with no needs at this time. Pt's  outside pt's room and presents as tearful. Pt's  states update from MD was upsetting and he is worried pt is not going to survive. SW provided support. SW providing psychosocial and counseling support, education, resources, and d/c planning as needed. SW continuing to follow and remains available.

## 2017-01-27 PROBLEM — R57.0 CARDIOGENIC SHOCK: Status: ACTIVE | Noted: 2017-01-01

## 2017-01-27 NOTE — PROGRESS NOTES
Bronch complete at BS by pulmonary MD Griffin. VSS, no acute distress, samples obtained and sent to lab.

## 2017-01-27 NOTE — PLAN OF CARE
Problem: Patient Care Overview  Goal: Plan of Care Review  Outcome: Ongoing (interventions implemented as appropriate)  No acute events throughout shift. Pt remains intubated/sedated. Previous medication infusions remain unchanged except for sedation medications which were increased d/t increased pt agitation this morning. No issues with IABP, vent, swan box overnight. AM SvO2 of 46-box re-calibrated successfully. Urine output  cc/hr throughout shift. 1x dose of tylenol given for increased temperature, pt maintained temp <100* throughout shift. Xa levels low per AM lab draw, bolus given via IV pump and rate increased from 26 to 28. See flowsheets for complete assessment and documentation. Plan of care discussed with  and daughter at bedside regarding continued monitoring of cardiac status and pt comfort. All questions answered, continuing to monitor pt closely and provide support.       CM-ICU  ABCDEF Early Mobility Inclusion Criteria Pass / Fail   M  Myocardial Stability · No dysrhythmia requiring new antidysrhythmic agent x 24 hours.  · No evidence of active myocardial ischemia x 24 hours.  · No femoral cardiac support devices (Impella, Tandem, or IABP). P   O  Oxygenation · PEEP < 10 cm H2O.  · FiO2 ? 60%.  · SpO2 > 88%. P   V  Vasopressors · No new start of any vasopressor in last 2 hours.  · Stable on current support over last 2 hours (at rest and with movement). F   E  Engages to Voice · RASS > -3. F   Pass? · Passes all four criteria. F

## 2017-01-27 NOTE — PLAN OF CARE
Problem: Patient Care Overview  Goal: Plan of Care Review  Outcome: Ongoing (interventions implemented as appropriate)  Pt remains intubated on 50% FiO2 and sedated on propofol and fentanyl for pain and comfort. BP improved MAP 55-75. Supported by epi and vasopressin. HR improved. SR 80s-100. Amio infusing. Balloon pump augmenting well . Pt diuresing well. Lasix drip infusing diuril given. Remains NPO. No bowel movement. POC discussed with family and MD. Goals of care addressed and questions answered.

## 2017-01-27 NOTE — CONSULTS
Infectious Disease Consult Note    Consulting Physician: ROSEANNE Harmon    Reason for Consult: management recommendations      Assessment:    61 year old female with NICM with EF 10% undergoing heart tx work up; with previous positive Strongyloides serology and underwent treatment with ivermectin x 2 doses   - patient decompensated and now intubated; elevated WBC and fever up to 102.6 and on pressor support   -all cultures so far negative   - CT chest with pulmonary infiltrates of unclear etiology    Recommendations:  1. Broad spectrum antibiotics - change cefepime to pip/tazo; continue vancomycin; given that creatinine is up to 2.3, will get vanc random in am and re-dose accordingly  2. Pulmonary for bronchoscopy  3. Labs: crypto ag, blasto ag, and histo  4. Will add fluconazole for now     Above was discussed with Dr. Pozo.  Please call with any questions,  Dolores Fried MD  Beeper [phone]     Chief Complaint: sepsis    History of Present Illness:  Vaishnavi Baca is a 61 y.o. year old female with NICM (15%) on dobutamine infusion who was transferred  from [facility] in [place] for heart transplant workup. Patient was treated for pneumonia Nov 16 and Dec 16 at which point her heart failure became apparent. During her transplant work up, serologies came back positive for strongyloides, and she was treated with Ivermectin x 2 days. Patient's family denies any recent travel history. 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 Her sick contacts include her , who currently has pneumonia.    Patient decompensated yesterday and was intubated overnight w/ further drops in MAP requiring increasing pressor support.  IABP was inserted on 1/24/17. R PICC was changed on 1/25/17. Patient had temperature spike temperature up to 102.6.   Impression CT chest: 1/21 - reviewed by me      Interval development of pulmonary infiltration in the superior  segment of the LEFT lower lobe, as well as an interval increase in a pleural-based infiltration in the anterior segment of the RIGHT upper lobe, which now obscures the previously identified 0.7 cm pulmonary nodule.    Interval posterior pleural thickening with small bilateral pleural effusions, RIGHT greater than LEFT and associated compressive atelectasis.    Stable, subsegmental consolidation in the RIGHT middle/lower lobe when compared to previous CT chest/abdomen 01/12/2017.    Stable, previously identified simple LEFT renal cysts.         Component      Latest Ref Rng & Units 1/26/2017           1:35 PM   Sodium      136 - 145 mmol/L 131 (L)   Potassium      3.5 - 5.1 mmol/L 5.0   Chloride      95 - 110 mmol/L 97   CO2      23 - 29 mmol/L 20 (L)   Glucose      70 - 110 mg/dL 245 (H)   BUN, Bld      8 - 23 mg/dL 31 (H)   Creatinine      0.5 - 1.4 mg/dL 2.3 (H)   Calcium      8.7 - 10.5 mg/dL 8.6 (L)   Total Protein      6.0 - 8.4 g/dL 6.9   Albumin      3.5 - 5.2 g/dL 2.5 (L)   Total Bilirubin      0.1 - 1.0 mg/dL 0.7   Alkaline Phosphatase      55 - 135 U/L 44 (L)   AST      10 - 40 U/L 13   ALT      10 - 44 U/L 9 (L)   Anion Gap      8 - 16 mmol/L 14   eGFR if African American      >60 mL/min/1.73 m:2 25.7 (A)   eGFR if non African American      >60 mL/min/1.73 m:2 22.3 (A)     Component      Latest Ref Rng & Units 1/26/2017 1/25/2017 1/24/2017               WBC      3.90 - 12.70 K/uL 16.63 (H) 14.53 (H) 9.41   RBC      4.00 - 5.40 M/uL 4.06 4.20 4.15   Hemoglobin      12.0 - 16.0 g/dL 9.7 (L) 10.2 (L) 10.2 (L)   Hematocrit      37.0 - 48.5 % 29.9 (L) 31.7 (L) 31.7 (L)   MCV      82 - 98 fL 74 (L) 76 (L) 76 (L)   MCH      27.0 - 31.0 pg 23.9 (L) 24.3 (L) 24.6 (L)   MCHC      32.0 - 36.0 % 32.4 32.2 32.2   RDW      11.5 - 14.5 % 18.2 (H) 18.0 (H) 17.9 (H)   Platelets      150 - 350 K/uL 278 315 287   MPV      9.2 - 12.9 fL 11.3 11.1 11.2   Gran #      1.8 - 7.7 K/uL 10.3 (H) 10.4 (H) 5.8   Lymph #      1.0 -  4.8 K/uL 3.7 2.4 2.4   Mono #      0.3 - 1.0 K/uL 1.9 (H) 1.5 (H) 1.0   Eos #      0.0 - 0.5 K/uL 0.5 0.1 0.2   Baso #      0.00 - 0.20 K/uL 0.06 0.03 0.03   Gran%      38.0 - 73.0 % 62.6 71.6 61.3   Lymph%      18.0 - 48.0 % 22.2 16.3 (L) 25.1   Mono%      4.0 - 15.0 % 11.6 10.6 10.6   Eosinophil%      0.0 - 8.0 % 3.2 0.7 2.3   Basophil%      0.0 - 1.9 % 0.4 0.2 0.3       Component      Latest Ref Rng & Units 1/18/2017 1/17/2017 1/13/2017   NIL      See text IU/mL 0.05     TB Antigen      See text IU/mL 0.32     TB Antigen - Nil      See text IU/mL 0.27     Mitogen - Nil      See text IU/mL >10.00     TB Gold       Negative     HSV 1 IgG      Negative   Negative   HSV 2 IgG      Negative   Positive (A)   Toxoplasma gondii IGG      0.0 - 6.4 IU/mL   41.5 (H)   Toxoplasma IGG Interpretation         Reactive (A)   Varicella IgG      0.00 - 0.90 ISR   2.67 (H)   Varicella Interpretation      Negative   Positive (A)   Hepatitis A Antibody IgG         Positive (A)   Hepatitis B Surface Ag         Negative   Hep B Core Total Ab         Negative   Hep B S Ab         Negative   Hepatitis C Ab         Negative   CMV IgG Interpretation         Reactive (A)   RPR      Non-reactive   Non-reactive   HIV 1/2 Ag/Ab      Negative   Negative   HTLV I/II Ab         Negative   Jamal-Barr Virus IgG (VCA)      Negative   Positive (A)   Strongyloides Ab IgG      Negative  Positive (A)      Review of Symptoms:  Unable to obtain.      Past Medical History:  Past Medical History   Diagnosis Date    Apical mural thrombus 01/04/2017    NICM (nonischemic cardiomyopathy) 01/04/2017    NICM (nonischemic cardiomyopathy) 01/04/2017       Past Surgical History:  History reviewed. No pertinent past surgical history.    Family History:  History reviewed. No pertinent family history.      Social History:  Social History     Social History    Marital status:      Spouse name: N/A    Number of children: N/A    Years of education: N/A      Occupational History    Not on file.     Social History Main Topics    Smoking status: Former Smoker     Packs/day: 0.25     Types: Cigarettes     Start date: 1/6/1977     Quit date: 9/6/2016    Smokeless tobacco: Not on file    Alcohol use Not on file    Drug use: Not on file    Sexual activity: Not on file     Other Topics Concern    Not on file     Social History Narrative       Allergies:  Review of patient's allergies indicates:  No Known Allergies    Pertinent Medications:  Antibiotics:   Antibiotics     Start     Stop Route Frequency Ordered    01/25/17 1200  vancomycin 1 g in dextrose 5 % 250 mL IVPB (ready to mix system)  (Vancomycin IVPB with levels panel)      -- IV Every 12 hours (non-standard times) 01/25/17 1056    01/25/17 1200  cefepime in dextrose 5 % 1 gram/50 mL IVPB 1 g      -- IV Every 8 hours (non-standard times) 01/25/17 1056          Physical Exam:  VS (24h):   Vitals:    01/26/17 2100   BP:    Pulse: 97   Resp: (!) 26   Temp: 99.5 °F (37.5 °C)     Temp:  [98.1 °F (36.7 °C)-103.2 °F (39.6 °C)]       General: intubated  HEENT: TRACEY. EOMI, no scleral icterus. No sinus tenderness. MMM.  Pulmonary: intubated; clear anteriorly  Cardiac: tachy rate;   Abdominal: Non-tender, non-distended.Bowel sounds present x 4. .  Extremities: Moves all extremities x 4. No peripheral edema. 2+ pulses.  Skin: No jaundice, rashes, or visible lesions.       Lines:             IABP 01/24/17 1500 7.5 Fr. 40 mL (Active)   Site Assessment Clean;Dry;Intact 1/26/2017  6:00 PM   Helium Tubing Clear 1/26/2017  6:00 PM   Arterial Line Status Arterial fluids per protocol 1/26/2017  6:00 PM   Arterial Line Interventions Leveled 1/26/2017  6:00 PM   Positioning Head of bed flat 1/26/2017  6:00 PM   Dressing Occlusive 1/26/2017  6:00 PM   Dressing Status Clean;Dry;Intact 1/26/2017  6:00 PM   Dressing Intervention Dressing changed 1/25/2017  6:00 AM   Dressing Change Due 02/01/17 1/26/2017  6:00 PM    Color/Movement/Sensation Capillary refill less than 3 sec 1/26/2017  6:00 PM   Number of days:2         Labs:  CBC:   Lab Results   Component Value Date    WBC 16.63 (H) 01/26/2017    WBC 14.53 (H) 01/25/2017    WBC 9.41 01/24/2017    WBC 9.83 01/23/2017    WBC 9.27 01/22/2017    HGB 9.7 (L) 01/26/2017    HCT 29.9 (L) 01/26/2017    MCV 74 (L) 01/26/2017     01/26/2017       BMP:   Recent Labs  Lab 01/26/17  1335   *   *   K 5.0   CL 97   CO2 20*   BUN 31*   CREATININE 2.3*   CALCIUM 8.6*   MG 2.6       LFT: Lab Results   Component Value Date    ALT 9 (L) 01/26/2017    AST 13 01/26/2017    ALKPHOS 44 (L) 01/26/2017    BILITOT 0.7 01/26/2017         Microbiology x 7d:   Microbiology Results (last 7 days)     Procedure Component Value Units Date/Time    Urine culture [[id number]] Collected:  01/25/17 0818    Order Status:  Completed Specimen:  Urine from Urine, Catheterized Updated:  01/26/17 1245     Urine Culture, Routine No growth    Blood culture [[id number]] Collected:  01/25/17 0818    Order Status:  Completed Specimen:  Blood from Peripheral, Antecubital, Left Updated:  01/26/17 1222     Blood Culture, Routine No Growth to date     Blood Culture, Routine No Growth to date    Narrative:       peripheral    Blood culture [[id number]] Collected:  01/25/17 0830    Order Status:  Completed Specimen:  Blood from Line, PICC Right Brachial Updated:  01/26/17 1222     Blood Culture, Routine No Growth to date     Blood Culture, Routine No Growth to date    Narrative:       PICC    Culture, Respiratory [[id number]] Collected:  01/25/17 1826    Order Status:  Completed Specimen:  Respiratory from Tracheal Aspirate Updated:  01/26/17 1035     Respiratory Culture Normal respiratory dayami     Gram Stain (Respiratory) <10 epithelial cells per low power field.     Gram Stain (Respiratory) Rare WBC's     Gram Stain (Respiratory) No organisms seen    IV catheter culture [[id number]]     Order Status:  Canceled  Specimen:  Catheter Tip from Catheter Tip, PICC     Culture, Respiratory [[id number]]     Order Status:  Canceled Specimen:  Respiratory from Sputum, Expectorated     Culture, Respiratory [[id number]]     Order Status:  Canceled Specimen:  Respiratory from Tracheal Aspirate     Urine culture [[id number]] Collected:  01/18/17 2223    Order Status:  Completed Specimen:  Urine from Urine, Clean Catch Updated:  01/20/17 0237     Urine Culture, Routine No growth

## 2017-01-27 NOTE — PROGRESS NOTES
MD rounds complete, family discussion regarding POC, family requesting time to discuss POC regarding pts wishes.

## 2017-01-27 NOTE — PROCEDURES
[facility]  Bronchoscopy   Procedure Note    SUMMARY     Date of Procedure: 1/27/2017    Procedure: Bronchoscopy, Diagnostic    Provider: Sabrina Griffin MD    Attending Provider: Dr. Benitez    Pre-Procedure Diagnosis: Right upper lobe opacities-suspected PNA    Post-Procedure Diagnosis: as above    Indication: Diagnositic    Anesthesia: General; performed in ICU      Description of the Findings of the Procedure:     Patient's family was consented for the procedure with all risk and benefit of the procedure explained in detail.  Family was given the opportunity to ask questions and all concerns were answered.  The bronchocope was inserted into the main airway via the ETT. An anatomical survey was done of the main airways and the subsegmental bronchus and no mucopurulent material or gross blood noted.  A bronchialalveolar lavage was performed using two 60 ml aliquots of normal saline instilled into the anterior segment of the RUL with return of about 80 ml of light yellowish-pink fluid. The areas were suctioned out and no gross bleeding noted. The bronchoscope was then removed.       Complications: none    Estimated Blood Loss (EBL): 0            Specimens: See labs      Condition: Stable in ICU        Disposition: Cont ICU care      Attestation: I performed the entire procedure. Dr. Benitez present for the entire procedure.    Sabrina Griffin MD  [facility]

## 2017-01-27 NOTE — PT/OT/SLP PROGRESS
Physical Therapy      Vaishnavi Baca  MRN: [id number]    Patient not seen today secondary to  (pt failed MOVE screen and not appropriate for ROM at this time).    Fide Godfrey, PT   1/27/2017

## 2017-01-27 NOTE — PHYSICIAN QUERY
"PT Name: Vaishnavi Baca  MR #: [id number]    Physician Query Form -Respiratory Condition Clarification    Reviewer  Ext     This form is a permanent document in the medical record.    Query Date: January 27, 2017    By submitting this query, we are merely seeking further clarification of documentation. Please utilize your independent clinical judgment when addressing the question(s) below.  (The Medical record reflects the following:)   Indicators   Supporting Clinical Findings Location in Medical Record   x "Wheezing", "Productive cough", "SOB", "BURGESS", "Use of accessory muscles" documented She endorses orthopnea and BURGESS    Respiratory: Positive for dyspnea    Respiratory: Positive for shortness of breath    Resp: denies cough, hemoptysis, +SOB H & P 1/13      Endo MD HO 1/18      ID MD PN 1/21      Cards MD AGOSTO 1/25   x Chest X-Ray =  Pulmonary edema versus pneumonia.      Stable consolidation in the right lung and pulmonary edema.     CXR 1/5      CXR 1/25   x "Hypoxia" documented We are consulted for hypoxia she experienced yesterday, though as per documentation she was on 2L NC sat'ing 95-98%.    Hypoxia improved however marked decrease in SVO2 today, worried that pt has impending cardiogenic shock-> will discuss IABP with interventional Pulm. MD HO 1/22            Heart Transplant MD AGOSOT 1/24    Respiratory Distress or Failure documented      RR=        PaO2=      PaCO2=      O2 sat=      Treatment:     x BiPAP/Intubation Pt did not respond to 80mg IV lasix given s/p intubation, scant urine out, pt has moderate agitation- propofol and fentanyl used for sedation.    Pt s/p intubation, w/ IABP    patient decompensated and now intubated Intensive Care RN PN 1/25                 Diet. PN 1/26    ID MD HO 1/26    Supplemental O2/Home O2:      Oxygen dependence      Other:     Provider, please specify diagnosis or diagnoses associated with above clinical findings.    [x  ] Acute Respiratory Failure  [  ] Chronic " Respiratory Failure  [  ] Acute on Chronic Respiratory Failure  [  ] Other Respiratory Diagnosis (Specify)        [  ] Clinically Undetermined    Please document in your progress notes daily for the duration of treatment, until resolved, and include in your discharge summary.

## 2017-01-27 NOTE — PROGRESS NOTES
All invasive gtts off at this time, morphine, fentanyl and propofol infusing for pt comfort. Pt appears in no distress at this time, HTS ROSEANNE Monroy, notified. POC discussed, states will come to bedside to turn IABP off. Pts family present and aware of POC. Ariella notified.

## 2017-01-27 NOTE — PROGRESS NOTES
Pulmonary & Critical Care Medicine   Progress Note    Reason for Consultation: hypoxemia    Interval Hx: Patient febrile yesterday afternoon. Cefepime switched to Zosyn and Fluconazole added. Vasopressin added yesterday am for lower MAPs. BPs have somewhat stabilized since. HR still elevated but trending down drom previous.     Past Medical History   Diagnosis Date    Apical mural thrombus 01/04/2017    NICM (nonischemic cardiomyopathy) 01/04/2017    NICM (nonischemic cardiomyopathy) 01/04/2017     History reviewed. No pertinent past surgical history.  Social History:   Social History     Social History    Marital status:      Spouse name: N/A    Number of children: N/A    Years of education: N/A     Occupational History    Not on file.     Social History Main Topics    Smoking status: Former Smoker     Packs/day: 0.25     Types: Cigarettes     Start date: 1/6/1977     Quit date: 9/6/2016    Smokeless tobacco: Not on file    Alcohol use Not on file    Drug use: Not on file    Sexual activity: Not on file     Other Topics Concern    Not on file     Social History Narrative     History reviewed. No pertinent family history.  Drug Allergies:   Review of patient's allergies indicates:  No Known Allergies  Current Infusions:   amiodarone 0.5 mg/min (01/27/17 0701)    DOBUTamine 5 mcg/kg/min (01/27/17 0701)    epinephrine infusion (NON-TITRATING) 0.06 mcg/kg/min (01/27/17 0701)    fentanyl 15 mL/hr at 01/27/17 0701    furosemide (LASIX) 5 mg/mL infusion (non-titrating) 15 mg/hr (01/27/17 0808)    heparin (porcine) in 5 % dex 28 Units/kg/hr (01/27/17 0750)    nitric oxide gas      propofol 25 mcg/kg/min (01/27/17 0701)    vasopressin (PITRESSIN) infusion 0.04 Units/min (01/27/17 0701)     Scheduled Medications:     cetirizine  5 mg Oral Daily    chlorhexidine  15 mL Mouth/Throat BID    chlorothiazide (DIURIL) IVPB  500 mg Intravenous Once    fluconazole (DIFLUCAN) IVPB  400 mg Intravenous  Q24H    magnesium oxide  400 mg Oral BID    pantoprazole  40 mg Intravenous Daily    piperacillin-tazobactam 4.5 g in dextrose 5 % 100 mL IVPB (ready to mix system)  4.5 g Intravenous Q8H    senna-docusate 8.6-50 mg  1 tablet Oral BID    vancomycin (VANCOCIN) IVPB  1,000 mg Intravenous Q12H    white petrolatum-mineral oil   Both Eyes BID     PRN Medications:   acetaminophen, aluminum & magnesium hydroxide-simethicone, benzonatate, calcium carbonate, diphenhydrAMINE, diphenhydrAMINE-zinc acetate 1-0.1%, heparin (PORCINE), insulin aspart, nitroGLYCERIN, ondansetron, promethazine (PHENERGAN) IVPB    Review of Systems:   A comprehensive 12-point review of systems was performed, and is negative except for those items mentioned above in the HPI section of this note.     Vital Signs:    Vitals:    01/27/17 0751   BP:    Pulse: 108   Resp:    Temp:        Fluid Balance:     Intake/Output Summary (Last 24 hours) at 01/27/17 0809  Last data filed at 01/27/17 0701   Gross per 24 hour   Intake          2915.67 ml   Output             2047 ml   Net           868.67 ml       Physical Exam:   General: Intubated, sedated but arousable.  Neck: Supple, no JVD, no masses or nodularities  Cardiac: Tachycardic in 110s; no murmurs appreciated  Respiratory: Intubated but clear to auscultation bilaterally   Abdomen: Soft, NT/ND. +BS  Extremities: No visible atrophy. No clubbing or cyanosis on nail exam.    Skin: Warm and dry without visible rash.     Personal Review and Summary of Prior Diagnostics    Laboratory Studies:     Recent Labs  Lab 01/27/17  0503   PH 7.307*   PCO2 56.2*   PO2 28*   HCO3 28.1*   POCSATURATED 46*   BE 2       Recent Labs  Lab 01/27/17  0400   WBC 18.02*   RBC 3.86*   HGB 9.2*   HCT 29.7*      MCV 77*   MCH 23.8*   MCHC 31.0*       Recent Labs  Lab 01/27/17  0400   *  130*   K 4.1  4.1   CL 94*  94*   CO2 22*  22*   BUN 36*  36*   CREATININE 1.7*  1.7*   MG 2.4  2.4       Microbiology  Data:   Microbiology Results (last 7 days)     Procedure Component Value Units Date/Time    Cryptococcal antigen [[id number]] Collected:  01/27/17 0400    Order Status:  Sent Specimen:  Blood from Blood Updated:  01/27/17 0443    Urine culture [[id number]] Collected:  01/25/17 0818    Order Status:  Completed Specimen:  Urine from Urine, Catheterized Updated:  01/26/17 1245     Urine Culture, Routine No growth    Blood culture [[id number]] Collected:  01/25/17 0818    Order Status:  Completed Specimen:  Blood from Peripheral, Antecubital, Left Updated:  01/26/17 1222     Blood Culture, Routine No Growth to date     Blood Culture, Routine No Growth to date    Narrative:       peripheral    Blood culture [[id number]] Collected:  01/25/17 0830    Order Status:  Completed Specimen:  Blood from Line, PICC Right Brachial Updated:  01/26/17 1222     Blood Culture, Routine No Growth to date     Blood Culture, Routine No Growth to date    Narrative:       PICC    Culture, Respiratory [[id number]] Collected:  01/25/17 1826    Order Status:  Completed Specimen:  Respiratory from Tracheal Aspirate Updated:  01/26/17 1035     Respiratory Culture Normal respiratory dayami     Gram Stain (Respiratory) <10 epithelial cells per low power field.     Gram Stain (Respiratory) Rare WBC's     Gram Stain (Respiratory) No organisms seen    IV catheter culture [[id number]]     Order Status:  Canceled Specimen:  Catheter Tip from Catheter Tip, PICC     Culture, Respiratory [[id number]]     Order Status:  Canceled Specimen:  Respiratory from Sputum, Expectorated     Culture, Respiratory [[id number]]     Order Status:  Canceled Specimen:  Respiratory from Tracheal Aspirate         Summary of Chest Imaging Personally Reviewed: See above in HPI    2D Echo: 1/14/17  CONCLUSIONS     1 - Eccentric hypertrophy.     2 - Severely depressed left ventricular systolic function (EF 10-15%).     3 - Biatrial enlargement.     4 - Right ventricular enlargement with  severely depressed systolic function.     5 - The estimated PA systolic pressure is 22 mmHg.     6 - Mild mitral regurgitation.     7 - Mild tricuspid regurgitation.     8 - Low central venous pressure.     9 - There is thrombus in the cardiac apex.     PFT's: 1/16/17:  INTERPRETATIONS:  Normal spirometry. Nitrogen washout lung volumes are normal. Normal diffusion  capacity. Normal MVV. Oximetry is normal.    Impression & Recommendations    Pt is a 60 y/o F with NICM with EF 10%, LV thrombus, DM, NSVT who is here undergoing workup for heart transplant with an abnormal CT scan    - image review reveals migrating infiltrates which appear to be non-infectious due to course and her clinical picture.  - unclear etiology of NICM though does not seem to be any clear correlation between the two.  - EMMANUEL, Rf, p-ANCA, c-ANCA, MPO and CCP Abs negative  - ESR 49, CRP uptrending 15-->21-->136-->149-->379  - WBC 14->16->18, febrile  - CXR w/ slightly worsened RLL opacification  - Continue Vanc, Zosyn, Fluconazole per ID  - As patient is now spiking fevers and WBC trending upward, there may be a superimposed infection on the underlying process which is likely a non-infectious inflammatory process (). We will perform bronchoscopy today w/ BAL today to r/o any active infection.    Will follow, thank you for the consult.    Bethany Tuttle MD  PGY1

## 2017-01-27 NOTE — PROGRESS NOTES
Progress Note  Heart Transplant Service    Admit Date: 1/4/2017   LOS: 23 days     SUBJECTIVE:     Follow up for: Acute decompensated heart failure    Interval History: Afebrile overnight, MAPs maintaining in 60-70 range, UOP stable overnight. Family at bedside, plan to bronch patient this am.     Scheduled Meds:   cetirizine  5 mg Oral Daily    chlorhexidine  15 mL Mouth/Throat BID    fluconazole (DIFLUCAN) IVPB  400 mg Intravenous Q24H    magnesium oxide  400 mg Oral BID    pantoprazole  40 mg Intravenous Daily    piperacillin-tazobactam 4.5 g in dextrose 5 % 100 mL IVPB (ready to mix system)  4.5 g Intravenous Q8H    senna-docusate 8.6-50 mg  1 tablet Oral BID    white petrolatum-mineral oil   Both Eyes BID     Continuous Infusions:   fentanyl 150 mcg/hr (01/27/17 1200)    morphine IV infusion 5 mg/hr (01/27/17 1405)    propofol 25 mcg/kg/min (01/27/17 1219)     PRN Meds:acetaminophen, aluminum & magnesium hydroxide-simethicone, benzonatate, calcium carbonate, diphenhydrAMINE, diphenhydrAMINE-zinc acetate 1-0.1%, glycopyrrolate, heparin (PORCINE), insulin aspart, nitroGLYCERIN, ondansetron, promethazine (PHENERGAN) IVPB    Review of patient's allergies indicates:  No Known Allergies    OBJECTIVE:     Vital Signs (Most Recent)  Temp: 100 °F (37.8 °C) (01/27/17 1000)  Pulse: 93 (01/27/17 1300)  Resp: (!) 28 (01/27/17 1300)  BP: (!) 132/55 (01/27/17 0808)  SpO2: 96 % (01/27/17 1300)    Vital Signs Range (Last 24H):  Temp:  [98.1 °F (36.7 °C)-102.6 °F (39.2 °C)]   Pulse:  []   Resp:  [15-32]   BP: (132)/(55)   SpO2:  [93 %-100 %]   Arterial Line BP: ()/(34-47)     I & O (Last 24H):    Intake/Output Summary (Last 24 hours) at 01/27/17 1424  Last data filed at 01/27/17 1200   Gross per 24 hour   Intake          2595.53 ml   Output             2095 ml   Net           500.53 ml     PAP: (32-41)/(19-27) 32/21  PAP (Mean):  [24 mmHg-32 mmHg] 25 mmHg  CVP:  [10 mmHg-15 mmHg] 15 mmHg  PCWP:  [20  mmHg-27 mmHg] 27 mmHg  CO:  [3 L/min-4.8 L/min] 3.8 L/min  CI:  [1.3 L/min/m2-2.1 L/min/m2] 1.7 L/min/m2    Telemetry: NSR 98 bpm    Constitutional: intubated/sedated  Neck: Right CVC with swan enrique in place.   Cardiovascular: Regular rate, regular rhythm and intact distal pulses. IABP 1:1  Pulmonary/Chest: Intubated/sedated. No audible wheezes, rhonchi, rales anteriorly.   Abdominal: + BS, exhibits no distension.  Extremities: no cyanosis, clubbing or edema. Feet cool to touch bilaterally. Upper extremities with delayed capillary refill     Labs:     Recent Labs  Lab 01/25/17  0336 01/26/17  0309 01/27/17  0400   WBC 14.53* 16.63* 18.02*   HGB 10.2* 9.7* 9.2*   HCT 31.7* 29.9* 29.7*    278 282   LYMPH 16.3*  2.4 22.2  3.7 16.5*  3.0   MONO 10.6  1.5* 11.6  1.9* 8.3  1.5*   EOSINOPHIL 0.7 3.2 4.1         Recent Labs  Lab 01/24/17  1212   INR 1.4*          Recent Labs  Lab 01/25/17  0336  01/26/17  0309  01/26/17  1335 01/26/17  2200 01/27/17  0400   *  < > 168*  < > 245* 187* 223*  223*   CALCIUM 8.8  < > 8.6*  < > 8.6* 8.7 8.7  8.7   ALBUMIN 2.8*  < > 2.6*  < > 2.5* 2.5* 2.4*  2.4*   PROT 7.1  < > 6.7  < > 6.9 7.1 6.9  6.9   *  < > 131*  < > 131* 129* 130*  130*   K 4.0  < > 3.8  < > 5.0 4.3 4.1  4.1   CO2 22*  < > 22*  < > 20* 23 22*  22*   CL 96  < > 97  < > 97 94* 94*  94*   BUN 18  < > 25*  < > 31* 34* 36*  36*   CREATININE 1.3  < > 1.6*  < > 2.3* 1.9* 1.7*  1.7*   ALKPHOS 44*  < > 43*  < > 44* 45* 63  63   ALT 10  < > 8*  < > 9* 9* 10  10   AST 15  < > 13  < > 13 15 14  14   BILITOT 0.7  < > 0.7  < > 0.7 0.6 0.6  0.6   MG 1.9  < > 2.3  --  2.6 2.4 2.4  2.4   PHOS 4.1  --  4.9*  --   --   --  5.4*   < > = values in this interval not displayed.  Estimated Creatinine Clearance: 46.5 mL/min (based on Cr of 1.7).    No results for input(s): BNP, BNPTRIAGEBLO in the last 168 hours.    No results for input(s): LDH in the last 168 hours.    Microbiology Results (last 7 days)      Procedure Component Value Units Date/Time    Culture, Respiratory with Gram Stain [[id number]] Collected:  01/27/17 0850    Order Status:  Completed Specimen:  Respiratory from BAL, RUL Updated:  01/27/17 1307     Gram Stain (Respiratory) <10 epithelial cells per low power field.     Gram Stain (Respiratory) Rare WBC's     Gram Stain (Respiratory) No organisms seen    Blood culture [[id number]] Collected:  01/25/17 0818    Order Status:  Completed Specimen:  Blood from Peripheral, Antecubital, Left Updated:  01/27/17 1222     Blood Culture, Routine No Growth to date     Blood Culture, Routine No Growth to date     Blood Culture, Routine No Growth to date    Narrative:       peripheral    Blood culture [[id number]] Collected:  01/25/17 0830    Order Status:  Completed Specimen:  Blood from Line, PICC Right Brachial Updated:  01/27/17 1222     Blood Culture, Routine No Growth to date     Blood Culture, Routine No Growth to date     Blood Culture, Routine No Growth to date    Narrative:       PICC    KOH prep [[id number]] Collected:  01/27/17 0850    Order Status:  Completed Specimen:  Body Fluid from Lung, RUL Updated:  01/27/17 1152     KOH Prep No yeast or fungal elements seen    Cryptococcal antigen [[id number]] Collected:  01/27/17 0400    Order Status:  Completed Specimen:  Blood from Blood Updated:  01/27/17 1109     Cryptococcal Ag, Blood Negative    Culture, Respiratory [[id number]] Collected:  01/25/17 1826    Order Status:  Completed Specimen:  Respiratory from Tracheal Aspirate Updated:  01/27/17 1055     Respiratory Culture Normal respiratory dayami     Gram Stain (Respiratory) <10 epithelial cells per low power field.     Gram Stain (Respiratory) Rare WBC's     Gram Stain (Respiratory) No organisms seen    Fungus culture [[id number]] Collected:  01/27/17 0850    Order Status:  Sent Specimen:  Respiratory from BAL, RUL Updated:  01/27/17 0957    AFB Culture & Smear [[id number]] Collected:  01/27/17 0850    Order  Status:  Sent Specimen:  Respiratory from BAL, RUL Updated:  01/27/17 0957    Gram stain [[id number]] Collected:  01/27/17 0850    Order Status:  Canceled Specimen:  Body Fluid from Lung, RUL Updated:  01/27/17 0956    Narrative:       Gram Stain was cancelled on 01/27/2017 at 10:24 by NMJ; Duplicate   order, test included in another profile. SEE ORDER #[id number]    Urine culture [[id number]] Collected:  01/25/17 0818    Order Status:  Completed Specimen:  Urine from Urine, Catheterized Updated:  01/26/17 1245     Urine Culture, Routine No growth    IV catheter culture [[id number]]     Order Status:  Canceled Specimen:  Catheter Tip from Catheter Tip, PICC     Culture, Respiratory [[id number]]     Order Status:  Canceled Specimen:  Respiratory from Sputum, Expectorated     Culture, Respiratory [[id number]]     Order Status:  Canceled Specimen:  Respiratory from Tracheal Aspirate             ASSESSMENT:     60 y/o with no PMHx, recently diagnosed with heart failure, [facility] with no evidence of CAD, transferred from [facility] in [place] for advanced heart failure consideration.    PLAN:     Acute on Chronic Combined Systolic and Diastolic Heart Failure, Diagnosed 11/2016 LVEDD 5.6 cm  Cardiogenic Shock  -NICM, NYHA IV, EF 15-20%, LVEDD: 5.6  -This am supported with IABP 1:1, epi @ 0.06,  5, vaso 0.04, nitri @ 10 ppm. CVP 13, SVO2 46%. CO/CO ~2/4, SVR 600s. Added low dose vaso @ 0.04 this am.   -Lasix gtt 15 mg/hr, Diuril 500 mg IVP x 1.  -Daily CXR for IABP placement. Daily SVO2  -Colorectal surgery consulted for colonoscopy but was not performed due to medical instability. Also needs mammogram to complete w/u for OHTx/VAD. Too sick at this time for c-scope.   -Positive strongyloides lab (treated with Ivermectin)   -2 g Na dietary restriction, 1500 mL fluid restriction, strict I/Os     Atrial Tachycardia  -IV amiodarone started yesterday. Continue 0.5 mg/min  -Will need lifevest at d/c and f/u for ICD      Leukocytosis, Fever  -Pancultured  -ID consulted, zosyn, vanc & fluconazole   -Appears to be a mixed vasodilatory and cardiogenic shock picture.   -Pulmonary to bronch patient today    LV Apical Thrombus  -Heparin infusion.  -Present on repeat 2D echo 1/14/17    DM  -Appreciate Endocrinology consultation  -HbgA1c: 7.6  -poct glucose AC/HS with low dose correction insulin

## 2017-01-28 LAB
HISTOPLASMA AG VALUE: 0 NG/ML
HISTOPLASMA ANTIGEN URINE: NEGATIVE

## 2017-01-28 NOTE — SIGNIFICANT EVENT
Death Exam    I was called to patients bedside to pronounce Vaishnavi Baca. No spontaneous movements were present. There was no response to verbal or tactile stimuli. No corneal reflex was present. No breath sounds were appreciated over either lung field. No carotid pulses were palpable. No heart sounds were able to be auscultatated over entire precordium. Family and attending physician were notified. Patient was DNR. Patients major medical illness was cardiogenic shock. Time of death was 20:20, confirmed and witnessed by nursing.    Signed:    Louis Jenkins MD  Cardiology Fellow, PGY-5  Pager: [phone]  1/27/2017 8:08 PM

## 2017-01-28 NOTE — PROGRESS NOTES
Pt  , MD/  at BS .  All belongings and dentures sent with spouse Romeo.  Pt has armband in place. LDA's removed.  Pt interred per hospital policy.  awaiting transport to [facility]. ADAMARIS notification made, see epic flowsheet.

## 2017-01-28 NOTE — DISCHARGE SUMMARY
[facility]  Discharge Summary      Admit Date: 1/4/2017    Discharge Date and Time: 1/27/2017 8:27 PM    Attending Physician: Comfort Pereira MD     Reason for Admission: Cardiogenic Shock    Procedures Performed: Procedure(s) (LRB):  HEART CATH-RIGHT (Right)     HPI  This is a 60 y/o woman with PMHx of NICM (15%) with no other known prior medical history who presents as a transfer from [facility] in [place] for higher level of care for ADHF in the setting of severe NICM.      She states that her issues first started in November when she had a bad virus that she never feels like she recovered from.      Per their records, she was first admitted on 12/11/16 when she presented to the hospital with SOB and was found to have NICM with EF of 15%. RHC and LHC were performed at that time that showed no evidence of CAD and elevated filling pressures. She was discharged with a Life Vest on 12/16. She returned on 12/18/16 with N/V and SOB and was treated for PNA with Levaquin. She then returned on 12/30/16 for a reported HR in the 30s for which she was evaluated for then discharged home from the ED. She most recently presented to [place] on 1/2/17 for right sided chest pain at which time there was concern for PNA so she was started on CTX/Azithromycin and solumedrol. Her WBC was 17 at arrival, she was also still taking Levaquin from her prior admission.     Cardiology was consulted who stated that she was intolerant to ACE inhibitors in the past (hypotension) and started the patient on corlanor for tachycardia in the setting of BB therapy. V/Q scan was also ordered for concern of PE.     She endorses orthopnea and BURGESS. 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    Hospital Course (synopsis of major diagnoses, care, treatment, and services provided during the course of the hospital stay):     Patient admitted with the above issues. Since  admission she has had a steady decline. She was initially placed on dobutamine and diuresed aggressively. Dobutamine wean was attempted, but unsuccessful. Her hospital course was also complicated by ectopic atrial tachycardia that was difficult to control even with assistance from EP. She was transferred to the ICU wear her decline continued. She began requiring increasing levels of support including IABP and increasing vasopressor requirements. She was started on inhaled nitric and intubated. She was assessed for advanced options, but was not deemed a candidate. As her support requirements increased, numerous family meetings were held in which her wishes were relayed. She was made a DNR and placed on comfort care. She passed peacefully at 20:20 on 1/27/17 with her family at bedside.    Consults: EP, Interventional Cardiology, CT surgery    Significant Diagnostic Studies: Labs:   CMP   Recent Labs  Lab 01/26/17  1335 01/26/17  2200 01/27/17  0400   * 129* 130*  130*   K 5.0 4.3 4.1  4.1   CL 97 94* 94*  94*   CO2 20* 23 22*  22*   * 187* 223*  223*   BUN 31* 34* 36*  36*   CREATININE 2.3* 1.9* 1.7*  1.7*   CALCIUM 8.6* 8.7 8.7  8.7   PROT 6.9 7.1 6.9  6.9   ALBUMIN 2.5* 2.5* 2.4*  2.4*   BILITOT 0.7 0.6 0.6  0.6   ALKPHOS 44* 45* 63  63   AST 13 15 14  14   ALT 9* 9* 10  10   ANIONGAP 14 12 14  14   ESTGFRAFRICA 25.7* 32.3* 37.0*  37.0*   EGFRNONAA 22.3* 28.1* 32.1*  32.1*   , CBC   Recent Labs  Lab 01/26/17  0309 01/27/17  0400   WBC 16.63* 18.02*   HGB 9.7* 9.2*   HCT 29.9* 29.7*    282    and INR   Lab Results   Component Value Date    INR 1.4 (H) 01/24/2017    INR 1.4 (H) 01/20/2017    INR 1.2 01/19/2017       Final Diagnoses:    Principal Problem: Cardiogenic shock   Secondary Diagnoses:   Active Hospital Problems    Diagnosis  POA    *Cardiogenic shock [R57.0]  Unknown    Pulmonary infiltrates on CXR [R91.8]  Yes    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]  Unknown    NSVT (nonsustained  ventricular tachycardia) [I47.2]  Yes    Chest pain [R07.9]  Yes    Fatigue [R53.83]  Yes    Palliative care encounter [Z51.5]  Not Applicable    Goals of care, counseling/discussion [Z71.89]  Not Applicable    Pre-transplant evaluation for heart transplant [Z01.818]  Not Applicable    Type 2 diabetes mellitus without complication [E11.9]  Yes    Organ transplant candidate [Z76.82]  Not Applicable    NICM (nonischemic cardiomyopathy) [I42.8]  Yes    Acute decompensated heart failure [I50.9]  Yes      Resolved Hospital Problems    Diagnosis Date Resolved POA   No resolved problems to display.       Discharged Condition:     Disposition:     Follow Up/Patient Instructions:     Medications:  None (patient  at medical facility)  No discharge procedures on file.

## 2017-01-28 NOTE — PLAN OF CARE
Problem: Spiritual Distress, Risk/Actual (Adult,Obstetrics,Pediatric)  Intervention: Facilitate Personal Exploration/Expression of Spirituality  Provided spiritual and emotional support to the pt's family at the time of death.

## 2017-01-28 NOTE — PHYSICIAN QUERY
PT Name: Vaishnavi Baca  MR #: [id number]     Physician Query Form - Documentation Clarification    Reviewer  Ext   Maryan [facility]  EXT [id number]  This form is a permanent document in the medical record.     Query Date: January 28, 2017  By submitting this query, we are merely seeking further clarification of documentation to reflect the severity of illness of your patient. Please utilize your independent clinical judgment when addressing the question(s) below.    (The Medical record reflects the following:)      Supporting Clinical Findings Location in Medical Record     [facility] broad spectrum abx for probable septic shock (fever, leukocytosis, elev procalcitonin though cx remains Neg)                                                                                       Progress note dated 1/2617                                                                                                       Doctor Randall: Probable septic shock documented in the progress note dated 1/26/17. There is no other documentation regarding septic shock. Can you please clarify if septic shock was ruled in or ruled out at  Discharge. Thank You    Septic shock ruled in at discharge ---x-----------------------  Septic shock ruled out at discharge --------------------  Other -----------------  Unable to determine -------------------------    Physician Use Only                                                                                                                               [  ] Unable to determine

## 2017-01-29 LAB — GALACTOMANNAN AG SPEC-ACNC: <0.5 INDEX

## 2017-01-30 PROBLEM — R57.0 CARDIOGENIC SHOCK: Status: ACTIVE | Noted: 2017-01-30

## 2017-01-30 LAB
BACTERIA BLD CULT: NORMAL
BACTERIA BLD CULT: NORMAL
BACTERIA SPEC AEROBE CULT: NORMAL
CMV DNA SPEC QL NAA+PROBE: NOT DETECTED
GRAM STN SPEC: NORMAL
SPECIMEN SOURCE: NORMAL

## 2017-01-30 NOTE — PT/OT/SLP DISCHARGE
Physical Therapy Discharge Summary    Vaishnavi Baca  MRN: [id number]   Cardiogenic shock   Patient Discharged from acute Physical Therapy on 17.  Please refer to prior PT noted date on 17 for functional status.     Assessment:   Pt  during hospital course.   GOALS:   Physical Therapy Goals        Problem: Physical Therapy Goal    Goal Priority Disciplines Outcome Goal Variances Interventions   Physical Therapy Goal     PT/OT, PT Ongoing (interventions implemented as appropriate)     Description:  Goals to be met by: 2017     Patient will increase functional independence with mobility by performin. Supine to sit with Modified Morgan -not  met  2. Sit to supine with Modified Morgan - met  3. Sit to stand transfer with Modified Morgan - not  4. Bed to chair transfer with Modified Morgan -not met  5. Gait  x 200 feet with Supervision -not met  6. Ascend/descend 5 stairs with no Handrails Stand-by Assistance - not met                  Reasons for Discontinuation of Therapy Services  Pt  17.       Pilar Phoenix, PT, DPT   2017  [phone]

## 2017-01-31 LAB
BLASTOMYCES AG INTERP: NEGATIVE
BLASTOMYCES AG RESULT: NORMAL NG/ML
BLASTOMYCES AG SPECIMEN: NORMAL
HISTOPLASMA AG INTERP.: NEGATIVE
HISTOPLASMA RESULT: NORMAL NG/ML

## 2017-02-06 LAB
B CELL RESULTS - XM AUTO: POSITIVE
B MCS AVERAGE - XM AUTO: 163.5
FXMAU TESTING DATE: NORMAL
HLA AB QL: POSITIVE
HLA AB SERPL: POSITIVE
HLATY INTERPRETATION: NORMAL
SCRFL TESTING DATE: NORMAL
SERUM COLLECTION DT - XM AUTO: NORMAL
SERUM COLLECTION DT: NORMAL
T CELL RESULTS - XM AUTO: NEGATIVE
T MCS AVERAGE - XM AUTO: -1.5

## 2017-02-07 LAB
HLA DRB4 1: NORMAL
HLA-A 1 SERO. EQUIV: 3
HLA-A 1: NORMAL
HLA-A 2 SERO. EQUIV: 25
HLA-A 2: NORMAL
HLA-B 1 SERO. EQUIV: 7
HLA-B 1: NORMAL
HLA-B 2 SERO. EQUIV: NORMAL
HLA-B 2: NORMAL
HLA-BW 1 SERO. EQUIV: 6
HLA-BW 2 SERO. EQUIV: NORMAL
HLA-C 1: NORMAL
HLA-C 2: NORMAL
HLA-CW 1 SERO. EQUIV: 7
HLA-CW 2 SERO. EQUIV: NORMAL
HLA-DQ 1 SERO. EQUIV: 7
HLA-DQ 2 SERO. EQUIV: 6
HLA-DQB1 1: NORMAL
HLA-DQB1 2: NORMAL
HLA-DRB1 1 SERO. EQUIV: 4
HLA-DRB1 1: NORMAL
HLA-DRB1 2 SERO. EQUIV: 15
HLA-DRB1 2: NORMAL
HLA-DRB3 1: NORMAL
HLA-DRB3 2: NORMAL
HLA-DRB345 1 SERO. EQUIV: 53
HLA-DRB345 2 SERO. EQUIV: 51
HLA-DRB4 2: NORMAL
HLA-DRB5 1: NORMAL
HLA-DRB5 2: NORMAL
SSDQB TESTING DATE: NORMAL
SSDRB TESTING DATE: NORMAL
SSOA TESTING DATE: NORMAL
SSOB TESTING DATE: NORMAL
SSOC TESTING DATE: NORMAL
SSODR TESTING DATE: NORMAL
TYSSO TESTING DATE: NORMAL

## 2017-03-01 LAB
FUNGUS SPEC CULT: NORMAL
FUNGUS SPEC CULT: NORMAL

## 2017-03-31 LAB
ACID FAST MOD KINY STN SPEC: NORMAL
MYCOBACTERIUM SPEC QL CULT: NORMAL

## 2018-07-27 NOTE — NURSING TRANSFER
"                          [facility]    Date of visit: 7/27/2018    Chief complaint:   Chief Complaint   Patient presents with     Pain     Interval history:  Diego Meng is a 40 year old male last seen by me for follow up on 5/04/18.      Brief history: 40 YEAR OLD MALE who began having low back pain at age 18.  Pain continued and progressed to neck and right shoulder pain for the last 20 years.  History of high dose opioids after having a L4-5 decompression at [facility] by Dr. Rios. Dose escalated and got \"out of control\".  He admits to overuse.  He was transitioned to buprenorphine by Dr. Almonte and was on very high dose 32mg when he was transferred to myself for ongoing care. PMHx is significant for anxiety.     Since his last visit, Diego Meng reports:  - Saw Teresa Gatheron - pain psychology on 7/11/2018  - Low back pain has worsened and he is having surgery with Dr. Rios and doing PT at [facility] prior to this.  Plans are for a anterior L4-S1 fusion with pelvic flixation for SI joint dysfunction.  2 night hospital stay   - Continuing to have epidoses of thoracic back pain which began at the end of March.  Was given some oxycodone by his PCP Dr. Murphy that he does not feel was even helpful.  Pain is located under his ribcage on the right and is worse upon waking in the am. He now has a new spot on the left.  Better with pain meds.   -Klonopin 1mg 4 times/day for anxiety for years.  He has been weaning down and is now taking 3/day. PCP is suggesting he wean off slowly over the next couple years and he is in agreement.  He has been on them for 10 years.  He is having side effects from the clonazepam - memory issues and trouble getting up in the morning.   - Left foot pain - plantar fascitis. No change  -He did not follow up with sports medicine - Dr. Kaufman.  He has a torn medial meniscus in his left knee and thinks he will likely need surgery for that " Patient arrived to [facility] 3078/3078 A. Connected to bedside monitor - cardiac monitoring and continuous pulse oximetry applied. Sinus tachycardia, 130s. Oxygen saturations % on room air. Call bell within reach, side rails raised x 2, bed locked and in lowest position. Primary service (SARA Christian, PA with hospitals) notified of patient arrival. Order by PA to turn off heparin infusion for central line placement. Patient in no acute distress, but complains of nausea. PA states okay to give next PRN promethazine dose early. Will continue to monitor.     as well. He had a knee injection a month ago which was not helpful.   -He has had many different injections over the years none of which were helpful for more than a few days.    - Full time  (designes health care buildings)   -His headaches are unchanged. They start at the back of his head/neck and radiate through his head to his eyes.  He attributes his headaches to rebound and has a very difficult time discontinuing excedrin.     Pain scores:  Pain intensity on average is 7 (7 last visit) on a scale of 0-10.     Current pain treatments:   Suboxone 8-2 mg #3/day  Excedrin #2-6/day for 20 years  lexapro 20 mg daily  Clonazepam 1 mg #3/day  Robaxin 500-1000mg PRN QID    Labs: CRP <2.9, RF <20, ESR 6    Side Effects: no side effects    Past pain treatments:  Diego Paintingfabianomike has been seen at a pain clinic in the past. [facility] Dr. Vázquez and [facility]  Medications:     Medrol dose carolin -no relief              botox -no relief              Anti-migraine: fioricet, migranal, eletriptan, frovatriptan, midrin, naraptriptan, sumatriptan, zolmitripatn -no relief from any of these              Anti-convulsants: depakote, topiramate, gabapentin, lyrica -all of these made him feel fuzzy and forgetful and they were not helpful              Opioids: oxycontin, morphine -no relief, hydrocodone -no relief, tramadol -no relief                Muscle relaxants: soma -no relief, flexeril -no relief and made him tired, norflex -no relief, tizanidine -no relief and made him tired              NSAIDs: allergic to this class of medications -rash, abdominal pain and cramping, toradol -upset stomach              Anti-depressants: cymbalta -no relief                Topicals: lidocaine -no relief  PT: [facility] in 2016 -helped for shoulder but flared up back and neck  Psychology: yes for pain at the [facility] -'continues to use these skills'  Acupuncture: tried it -no  "relief  Chiropractic care: tried it -helped initially but then flared up pain  TENS Unit: no relief  Injections: R L4 and L5 TF BAY 12/24/16 -flared pain, cervical epidural steroid injection -provided 2-4 weeks of relief but had significant flare up for 2 weeks post-procedure, cervical trigger point injections -minimal relief, lumbar medial branch block -no relief, lumbar paraspinal trigger point injection 5/17/17 - not helpful  Surgery: right L4-L5 hemilaminectomy May 2015 Dr. Rios    Medications:  Current Outpatient Prescriptions   Medication Sig Dispense Refill     atorvastatin (LIPITOR) 40 MG tablet Take 1 tablet (40 mg) by mouth daily 90 tablet 3     buprenorphine HCl-naloxone HCl (SUBOXONE) 8-2 MG per film Place 1 Film under the tongue every 8 hours Ok to dispense on 05/04/18 and start 05/05/18 90 Film 2     clonazePAM (KLONOPIN) 1 MG tablet Max 3.3 tabs per day for the first two weeks. Max 3 tabs per day for the second two weeks. At least 6 hours between doses. 89 tablet 0     escitalopram (LEXAPRO) 20 MG tablet Take 1 tablet (20 mg) by mouth daily 90 tablet 3     esomeprazole (NEXIUM) 20 MG capsule Take 1 capsule (20 mg) by mouth every morning (before breakfast) Take 30-60 minutes before eating. 30 capsule 0     Methocarbamol (ROBAXIN PO)        naloxone (NARCAN) nasal spray Spray 1 spray (4 mg) into one nostril alternating nostrils as needed for opioid reversal every 2-3 minutes until assistance arrives (Patient not taking: Reported on 7/5/2018) 0.2 mL 0     omega 3 1000 MG CAPS Take 1 g by mouth daily 90 capsule        Medical History: any changes in medical history since they were last seen? no    Review of Systems:  The 14 system ROS was reviewed from the intake questionnaire, and is positive for: headache, joint pain, back pain, anxiety ans stress  Any bowel or bladder problems: none  Mood: \"okay\"    Physical Exam:  Blood pressure 118/77, pulse 82, SpO2 97 %.  General: no acute distress, pleasant, " sitting comfortably   Gait: antalgic gait  MSK: full strength but with some guarding due to pain    Imaging:  Thoracic MRI 3/23/2018:       Lumbar MRI completed on 4/24/17:  IMPRESSION:  1. L5-S1 small caudally dissecting 0.3 cm disc extrusion slightly to  the left of midline without apparent neural impingement or stenosis.  2. L4-L5 small central disc protrusion, without apparent neural  impingement or central stenosis, and mild bilateral foraminal  stenosis.    Cervical spine MRI completed on 11/14/14:  CONCLUSION:  1. Moderate left foraminal stenosis at C5-C6.  2. Other relatively minor degenerative changes, no further high-grade central canal or foraminal stenosis.     Right shoulder x-ray completed on 4/2/15:  FINDINGS: No evidence of fracture or dislocation. Normal glenohumeral joint alignment without evidence of degenerative changes. No evidence of significant acromioclavicular joint degenerative joint disease. No acromial or coracoid enthesophyte. Mild undersurface acromial sclerosis. Downslope type three acromion. No blastic or lytic lesions.  IMPRESSION:  Right shoulder type three acromion otherwise within normal limits.     [place] Board of Pharmacy Data Base Reviewed: YES; as expected, no concern for abuse or misuse    Assessment:   1. Chronic pain syndrome - widespread pain  2. Opioid dependency   3. Chronic low back pain.   S/P Right L4-L5 hemilaminectomy partial facetectomy in May 2015 with Dr. Rios.   Currently planning to have a lumbar L4-S1 fusion   4. Chronic neck pain. Cervical facet arthropathy.  5. Chronic headaches. Likely related to facet arthropathy, psychogenic, and rebound (excedrin)  6. Anxiety with panic attacks. Takes lexapro and clonazepam. Stable.  7. Benzodiazepine dependence - has been on Clonazepam for about 10 years without any abuse or misuse.  Plan is to taper off this over the next couple years secondary to side effects of sedation and memory loss.  His PCP is managing  this taper.   8. Tobacco use disorder - currently wearing a patch and working on quiting for his lumbar fusion  9. Chronic bilateral knee pain. Left knee meniscus repair on 11/28/16 with Dr. Kaufman.    10. Thoracic myofascial pain - normal MRI  11. Chemical coping.     Plan:  1. Pain Psychology - YES, continue with Teresa Tre at the [place] location  2. PT - YES - plans to schedule at [facility]    3. Medication Management:    1. UDS -  5/04/2018 as expected / Opioid agreement - 6/30/2017 -  repeat UDS TOX 13 at every visit   2. Suboxone 8mg films - TID  2 refills given   4. Labs - CRP, ESR, RF were reviewed, all normal  5. OTHER:  Cait-operative plan is to discontinue Suboxone 6 days prior to having his lumbar fusion.  I will give him Oxycodone during the time period between discontinuing and having his surgery.  Recommend regional anesthesia and intraoperative ketamine gtt.  Dr. Rios will need to manage his post operative pain.  When this is controlled we will get him back on his suboxone with a home induction  6. Injections: TRIGGER POINT INJECTIONs - thoracic done today (see note below)  5. Follow up with Dr. Vallecillo - 3 months - End of October 2018      Total time spent was 30 minutes, and more than 50% of face to face time was spent in counseling and/or coordination of care regarding the above assessment and plan.    Reba Vallecillo MD [facility]  [facility] - Procedure Note    Date of Visit: 7/27/2018    Pre procedure Diagnosis: myofascial pain/myositis 60.9   Post procedure Diagnosis: Same  Procedure performed: trigger point injections  Anesthesia: none  Complications: none  Operators: Reba Vallecillo MD    Indications:   Diego Meng is a 40 year old male with a history of mid back pain.  Exam shows myofascial pain of the muscle groups listed below and they have tried conservative treatment including PT and  medication.    Options/alternatives, benefits and risks were discussed with the patient including bleeding, infection, tissue trauma and pnuemothorax.  Questions were answered to his satisfaction and he agrees to proceed. Voluntary informed consent was obtained and signed.     Vitals were reviewed: Yes  Allergies were reviewed:  Yes   Medications were reviewed:  Yes   Pre-procedure pain score: 7/10    Procedure:  After getting informed consent, a Pause for the Cause was performed.    Trigger points were identified by patient, and marked when appropriate.  The area was prepped with Chloroprep.    Using clean technique, injections were completed using a 25G, 1.5 inch needle.  After negative aspiration, injection was completed.  A total of 2 locations were injected.  When possible, tissue was retracted from the chest wall to avoid lung injury.    Muscle groups injected:  Left Rhomboid  Right low thoracic paraspinals    Injection solution contained:  10ml of 1% lidocaine     Hemostasis was achieved, the area was cleaned, and bandaids were placed when appropriate.  The patient tolerated the procedure well.  Breath sounds were normal.      Post-procedure pain score: 5/10  Follow-up includes:   -f/u with the referring provider  -repeat as needed      Reba Vallecillo MD   [facility]

## 2020-04-28 NOTE — PROGRESS NOTES
[facility]  Pediatric Specialty Clinic [place]      Pediatric Call Center Scheduling and Nurse Questions:  [phone]  Mayra Bunn RN Care Coordinator    After Hours Needing Immediate Care:  [phone].  Ask for the on-call pediatric doctor for the specialty you are calling for be paged.  For dermatology urgent matters that cannot wait until the next business day, is over a holiday and/or a weekend please call [phone] and ask for the Dermatology Resident On-Call to be paged.    Prescription Renewals:  Please call your pharmacy first.  Your pharmacy must fax requests to [phone].  Please allow 2-3 days for prescriptions to be authorized.    If your physician has ordered a CT or MRI, you may schedule this test by calling [facility] in [place] at [phone].    **If your child is having a sedated procedure, they will need a history and physical done at their Primary Care Provider within 30 days of the procedure.  If your child was seen by the ordering provider in our office within 30 days of the procedure, their visit summary will work for the H&P unless they inform you otherwise.  If you have any questions, please call the RN Care Coordinator.**     Progress Note  Heart Transplant Service    Admit Date: 1/4/2017   LOS: 3 days     SUBJECTIVE:     Follow up for: Acute decompensated heart failure    Interval History: Patient feeling nauseous this morning.     Scheduled Meds:   furosemide  40 mg Oral BID    lisinopril  5 mg Oral Daily    pantoprazole  40 mg Oral Daily    sodium chloride 0.9%  3 mL Intravenous Q8H    spironolactone  12.5 mg Oral Daily    trazodone  50 mg Oral QHS     Continuous Infusions:   DOBUTamine 2.5 mcg/kg/min (01/06/17 1500)    heparin (porcine) in D5W 13 Units/kg/hr (01/06/17 1413)     PRN Meds:calcium carbonate, heparin (PORCINE), heparin (PORCINE), ondansetron, promethazine (PHENERGAN) IVPB    Review of patient's allergies indicates:  No Known Allergies    OBJECTIVE:     Vital Signs (Most Recent)  Temp: 97.8 °F (36.6 °C) (01/07/17 0729)  Pulse: (!) 112 (01/07/17 0855)  Resp: 18 (01/07/17 0729)  BP: 103/62 (01/07/17 0729)  SpO2: 99 % (01/07/17 0729)    Vital Signs Range (Last 24H):  Temp:  [96.7 °F (35.9 °C)-97.8 °F (36.6 °C)]   Pulse:  []   Resp:  [16-28]   BP: (103-124)/(56-71)   SpO2:  [97 %-99 %]     I & O (Last 24H):    Intake/Output Summary (Last 24 hours) at 01/07/17 1030  Last data filed at 01/07/17 0515   Gross per 24 hour   Intake           941.86 ml   Output             1950 ml   Net         -1008.14 ml            Telemetry: NSR with occasional PVCs    Physical Exam   Constitutional: She is oriented to person, place, and time. She appears well-developed and well-nourished.   HENT:   Head: Normocephalic and atraumatic.   Eyes: EOM are normal. Pupils are equal, round, and reactive to light.   Neck: Normal range of motion. Neck supple. JVD present.   Cardiovascular: Normal rate, regular rhythm, S1 normal and S2 normal.  Frequent extrasystoles are present. Exam reveals gallop and S3.    Lower extremities warm to touch bilateral, brisk capillary refill    Pulmonary/Chest: Effort normal and breath sounds normal. No  respiratory distress. She has no wheezes. She has no rales.   Abdominal: Soft. Bowel sounds are normal. She exhibits no distension. There is no tenderness.   Musculoskeletal: Normal range of motion. She exhibits edema (trace bilateral).   Neurological: She is alert and oriented to person, place, and time.   Skin: Skin is warm and dry.       Labs:       Recent Labs  Lab 01/05/17  0432 01/06/17  0306 01/07/17  0442   WBC 17.94* 14.22* 13.72*   HGB 12.1 11.9* 14.0   HCT 38.1 37.9 44.2    252 280   LYMPH 7.2*  1.3 13.2*  1.9 18.3  2.5   MONO 3.2*  0.6 6.4  0.9 6.1  0.8   EOSINOPHIL 0.0 0.0 0.5         Recent Labs  Lab 01/05/17  0023  01/05/17  1433 01/06/17  0306 01/07/17  0442   APTT 26.6  --  26.1  --   --    INR 2.0*  < > 1.7* 1.6* 1.3*   < > = values in this interval not displayed.       Recent Labs  Lab 01/05/17  1218  01/06/17  0306  01/06/17  1312 01/06/17  1753 01/07/17  0442   *  < > 222*  --   --  200* 155*  155*   CALCIUM 8.5*  < > 8.8  --   --  9.4 9.2  9.2   ALBUMIN 2.9*  --  3.1*  --   --   --  3.4*   PROT 6.7  --  6.7  --   --   --  7.5     < > 141  --   --  142 144  144   K 4.1  < > 4.1  < > 3.8 3.9 4.0  4.0   CO2 25  < > 31*  --   --  32* 38*  38*   CL 99  < > 100  --   --  93* 94*  94*   BUN 56*  < > 45*  --   --  37* 34*  34*   CREATININE 1.8*  < > 1.4  --   --  1.3 1.3  1.3   ALKPHOS 54*  --  56  --   --   --  61   ALT 35  --  31  --   --   --  40   AST 24  --  24  --   --   --  33   BILITOT 1.0  --  1.0  --   --   --  1.3*   MG 2.4  < > 2.3  < > 1.8 2.3 2.2   PHOS 2.9  --  2.8  --  2.6*  --  3.1   < > = values in this interval not displayed.  Estimated Creatinine Clearance: 61.8 mL/min (based on Cr of 1.3).      Recent Labs  Lab 01/05/17 0023   BNP <10       No results for input(s): LDH in the last 168 hours.    Microbiology Results (last 7 days)     Procedure Component Value Units Date/Time    Blood culture [[id number]] Collected:  01/05/17 0035    Order Status:   Completed Specimen:  Blood from Line, Jugular, Internal Right Updated:  01/07/17 0613     Blood Culture, Routine No Growth to date     Blood Culture, Routine No Growth to date     Blood Culture, Routine No Growth to date    Blood culture [[id number]] Collected:  01/05/17 0054    Order Status:  Completed Specimen:  Blood from Peripheral, Wrist, Left Updated:  01/07/17 0613     Blood Culture, Routine No Growth to date     Blood Culture, Routine No Growth to date     Blood Culture, Routine No Growth to date        2D Echo 1/5/2017      1 - Severely depressed left ventricular systolic function (EF 15-20%). LVEDD 5.4 cm    2 - Normal left ventricular diastolic function.     3 - Right ventricular enlargement with moderately depressed systolic function.     4 - Biatrial enlargement.     5 - Pulmonary hypertension. The estimated PA systolic pressure is 45 mmHg.     6 - Moderate mitral regurgitation.     7 - Moderate tricuspid regurgitation.     8 - Increased central venous pressure.     9 - There is thrombus in the cardiac apex.     ASSESSMENT:     62 y/o with no PMHx, recently diagnosed with heart failure, [facility] with no evidence of CAD, being transferred from [facility] in [place] for advanced heart failure consideration.    PLAN:     Acute Decompensated Heart Failure, [facility]  -EF 15-20%, LVEDD 5.4 cm, NYHA IV symptoms  -Continue  2.5 mcg/kg/min  -Increase lisinopril to 5 mg today, titrate other GDMT as tolerated  -May need to d/c with lifevest  -2 g Na dietary restriction, 1500 mL fluid restriction, strict I/Os    LV Apical Thrombus  -Continue heparin gtt, will transition to PO for outpatient when appropriate    Leukocytosis, trending down  -Was given solumedrol at OSH  -BCx 1/5/17 NGTD, will f/u final results  -D/c Abx for now

## 2023-04-25 NOTE — PROGRESS NOTES
Progress Note  Heart Transplant Service    Admit Date: 1/4/2017   LOS: 19 days     SUBJECTIVE:     Follow up for: Acute decompensated heart failure    Interval History: Reports she had trouble sleeping last night, but otherwise she feels better.  Her breathing is back to baseline and she has not been nauseous since yesterday       Scheduled Meds:   cetirizine  5 mg Oral Daily    famotidine  20 mg Oral BID    furosemide  20 mg Oral Daily    magnesium oxide  400 mg Oral BID    senna-docusate 8.6-50 mg  1 tablet Oral BID    sodium chloride 0.9%  10 mL Intravenous Q6H     Continuous Infusions:   DOBUTamine 2.5 mcg/kg/min (01/23/17 0600)    heparin (porcine) in 5 % dex 22.976 Units/kg/hr (01/23/17 0600)     PRN Meds:aluminum & magnesium hydroxide-simethicone, calcium carbonate, diphenhydrAMINE, diphenhydrAMINE-zinc acetate 1-0.1%, heparin (PORCINE), insulin aspart, nitroGLYCERIN, ondansetron, promethazine (PHENERGAN) IVPB, Flushing PICC Protocol **AND** sodium chloride 0.9% **AND** sodium chloride 0.9%    Review of patient's allergies indicates:  No Known Allergies    OBJECTIVE:     Vital Signs (Most Recent)  Temp: 98.5 °F (36.9 °C) (01/23/17 0300)  Pulse: (!) 128 (01/23/17 0600)  Resp: (!) 22 (01/23/17 0600)  BP: (!) 95/56 (01/23/17 0600)  SpO2: (!) 93 % (01/23/17 0600)    Vital Signs Range (Last 24H):  Temp:  [98.5 °F (36.9 °C)-99.5 °F (37.5 °C)]   Pulse:  [112-139]   Resp:  [13-36]   BP: ()/(51-66)   SpO2:  [91 %-98 %]     I & O (Last 24H):    Intake/Output Summary (Last 24 hours) at 01/23/17 0733  Last data filed at 01/23/17 0600   Gross per 24 hour   Intake           1512.5 ml   Output             1400 ml   Net            112.5 ml          Telemetry: sinus tach, atrial tach, PVC's  Constitutional: laying in bed NAD  Neck: no JVD elevation    Cardiovascular: Tachycardic rate, regular rhythm and intact distal pulses.  No murmurs heard, S3   Pulmonary/Chest: Faint crackles at the bases  Abdominal: + BS,  Upon contact with patient's father, patient's father stated patient has already been evaluated by doctor and provided with referrals. Father is looking to schedule appointment with psychiatrist and declines triage at this time. Father warm transferred to scheduling for further assistance.    exhibits no distension. There is no tenderness. There is no rebound.   Extremities: no cyanosis, clubbing or edema    Labs:     Recent Labs  Lab 01/21/17  0506 01/22/17  0400 01/23/17  0340   WBC 11.50 9.27 9.83   HGB 11.6* 10.9* 10.3*   HCT 36.0* 33.8* 32.0*    240 237   LYMPH 21.0  2.4 31.1  2.9 28.6  2.8   MONO 5.8  0.7 9.7  0.9 8.6  0.9   EOSINOPHIL 1.5 2.5 2.5         Recent Labs  Lab 01/18/17  0526 01/19/17  0528 01/20/17  0520   INR 1.1 1.2 1.4*          Recent Labs  Lab 01/21/17  0506 01/21/17  1818 01/22/17  0400 01/22/17  0754 01/22/17  1717 01/23/17  0340   * 135*  --  124* 122*  --    CALCIUM 9.1 8.8  --  8.7 8.7  --    ALBUMIN 3.2* 3.0*  --  2.9* 2.8*  --    PROT 7.0 6.8  --  6.7 6.6  --    * 133*  --  134* 134*  --    K 4.8 4.5  --  4.2 4.2  --    CO2 26 25  --  28 27  --     99  --  99 99  --    BUN 15 17  --  15 18  --    CREATININE 1.2 1.2  --  1.1 1.2  --    ALKPHOS 45* 44*  --  40* 38*  --    ALT 14 13  --  11 11  --    AST 18 16  --  14 12  --    BILITOT 0.7 0.8  --  0.9 0.8  --    MG 2.4 2.0  --  2.0 1.8  --    PHOS 4.4  --  3.9  --   --  3.5     Estimated Creatinine Clearance: 61.8 mL/min (based on Cr of 1.2).    No results for input(s): BNP, BNPTRIAGEBLO in the last 168 hours.    No results for input(s): LDH in the last 168 hours.    Microbiology Results (last 7 days)     Procedure Component Value Units Date/Time    Urine culture [[id number]] Collected:  01/18/17 2223    Order Status:  Completed Specimen:  Urine from Urine, Clean Catch Updated:  01/20/17 0237     Urine Culture, Routine No growth    Blood culture [[id number]] Collected:  01/11/17 2330    Order Status:  Completed Specimen:  Blood Updated:  01/17/17 0612     Blood Culture, Routine No growth after 5 days.            ASSESSMENT:     62 y/o with no PMHx, recently diagnosed with heart failure, [facility] with no evidence of CAD, transferred from [facility] for advanced heart failure  consideration.    PLAN:     Acute on Chronic Combined Systolic and Diastolic Heart Failure, Diagnosed 11/2016 LVEDD 5.6 cm  -NICM, NYHA IV, EF 15-20%, LVEDD: 5.6  -attempted GDMT with Lisinopril and Spironolactone but was d/c'ed 2/2 hypotension  - 2.5 mcg/kg/min, PICC line placed. Unable to titrate GDMT secondary to hypotension  -Lasix started 1/18. Patient net negative 4.7L and euvloemic.  CVP 4 this am, will hold Lasix today    -Continue education & awaiting finalized workup  -Colorectal surgery consulted for colonoscopy; was scheduled for today but will re schedule after tachycardia and lungs are improved  -Positive strongyloides lab (treated with Ivermectin)   -2 g Na dietary restriction, 1500 mL fluid restriction, strict I/Os     Atrial Tachycardia  -EP consulted today.  Will await further recommendations   -TSH, FT4 were ok, am Cortisol ordered     Hypoxemia  -Improved  - Pulmonary consult, appreciate recs.  Will plan for bronch and BAL after HR is better  - IS, PT/OT    Chest Pain, resolved  -Suspect it is non-cardiac, she has been chest pain free   -Troponins negative, EKG without ischemic changes  -Will continue to monitor    LV Apical Thrombus  -Heparin infusion.  Coumadin on hold until workup is complete.      -Present on repeat 2D echo 1/14/17    NSVT/VT  -Will monitor closely on Dobutamine    DM  -Appreciate Endocrinology consultation  -HbgA1c: 7.6  -poct glucose AC/HS with low dose correction insulin    Lung Nodule  -found incidentally on CT-repeat CT in 6-12 months     Itching:  -Switch benadryl to zyrtec today with Pepcid  -Dermatology consulted appreciate odilon     Insomnia  trazodone started     Dorinda Christian PA-C  HTS spectralink: [id number]